# Patient Record
Sex: FEMALE | Race: BLACK OR AFRICAN AMERICAN | NOT HISPANIC OR LATINO | URBAN - METROPOLITAN AREA
[De-identification: names, ages, dates, MRNs, and addresses within clinical notes are randomized per-mention and may not be internally consistent; named-entity substitution may affect disease eponyms.]

---

## 2019-05-17 ENCOUNTER — INPATIENT (INPATIENT)
Facility: HOSPITAL | Age: 79
LOS: 11 days | Discharge: HOME HEALTH | DRG: 299 | End: 2019-05-29
Attending: INTERNAL MEDICINE | Admitting: HOSPITALIST
Payer: MEDICAID

## 2019-05-17 VITALS — WEIGHT: 160.06 LBS | HEIGHT: 62 IN

## 2019-05-17 LAB
ALBUMIN SERPL ELPH-MCNC: 3.9 G/DL — SIGNIFICANT CHANGE UP (ref 3.3–5.2)
ALP SERPL-CCNC: 86 U/L — SIGNIFICANT CHANGE UP (ref 40–120)
ALT FLD-CCNC: 15 U/L — SIGNIFICANT CHANGE UP
ANION GAP SERPL CALC-SCNC: 13 MMOL/L — SIGNIFICANT CHANGE UP (ref 5–17)
AST SERPL-CCNC: 26 U/L — SIGNIFICANT CHANGE UP
BASOPHILS # BLD AUTO: 0 K/UL — SIGNIFICANT CHANGE UP (ref 0–0.2)
BASOPHILS NFR BLD AUTO: 0.4 % — SIGNIFICANT CHANGE UP (ref 0–2)
BILIRUB SERPL-MCNC: 0.3 MG/DL — LOW (ref 0.4–2)
BUN SERPL-MCNC: 16 MG/DL — SIGNIFICANT CHANGE UP (ref 8–20)
CALCIUM SERPL-MCNC: 10.8 MG/DL — HIGH (ref 8.6–10.2)
CHLORIDE SERPL-SCNC: 100 MMOL/L — SIGNIFICANT CHANGE UP (ref 98–107)
CO2 SERPL-SCNC: 27 MMOL/L — SIGNIFICANT CHANGE UP (ref 22–29)
CREAT SERPL-MCNC: 1.11 MG/DL — SIGNIFICANT CHANGE UP (ref 0.5–1.3)
CRP SERPL-MCNC: 1.29 MG/DL — HIGH (ref 0–0.4)
EOSINOPHIL # BLD AUTO: 0.1 K/UL — SIGNIFICANT CHANGE UP (ref 0–0.5)
EOSINOPHIL NFR BLD AUTO: 2 % — SIGNIFICANT CHANGE UP (ref 0–6)
ERYTHROCYTE [SEDIMENTATION RATE] IN BLOOD: 7 MM/HR — SIGNIFICANT CHANGE UP (ref 0–20)
GLUCOSE SERPL-MCNC: 91 MG/DL — SIGNIFICANT CHANGE UP (ref 70–115)
HBA1C BLD-MCNC: 5.4 % — SIGNIFICANT CHANGE UP (ref 4–5.6)
HCT VFR BLD CALC: 34.4 % — LOW (ref 37–47)
HGB BLD-MCNC: 11 G/DL — LOW (ref 12–16)
LYMPHOCYTES # BLD AUTO: 2.1 K/UL — SIGNIFICANT CHANGE UP (ref 1–4.8)
LYMPHOCYTES # BLD AUTO: 43.2 % — SIGNIFICANT CHANGE UP (ref 20–55)
MCHC RBC-ENTMCNC: 28.1 PG — SIGNIFICANT CHANGE UP (ref 27–31)
MCHC RBC-ENTMCNC: 32 G/DL — SIGNIFICANT CHANGE UP (ref 32–36)
MCV RBC AUTO: 88 FL — SIGNIFICANT CHANGE UP (ref 81–99)
MONOCYTES # BLD AUTO: 0.4 K/UL — SIGNIFICANT CHANGE UP (ref 0–0.8)
MONOCYTES NFR BLD AUTO: 7.9 % — SIGNIFICANT CHANGE UP (ref 3–10)
NEUTROPHILS # BLD AUTO: 2.3 K/UL — SIGNIFICANT CHANGE UP (ref 1.8–8)
NEUTROPHILS NFR BLD AUTO: 46.3 % — SIGNIFICANT CHANGE UP (ref 37–73)
PLATELET # BLD AUTO: 308 K/UL — SIGNIFICANT CHANGE UP (ref 150–400)
POTASSIUM SERPL-MCNC: 4.6 MMOL/L — SIGNIFICANT CHANGE UP (ref 3.5–5.3)
POTASSIUM SERPL-SCNC: 4.6 MMOL/L — SIGNIFICANT CHANGE UP (ref 3.5–5.3)
PROT SERPL-MCNC: 9.2 G/DL — HIGH (ref 6.6–8.7)
RBC # BLD: 3.91 M/UL — LOW (ref 4.4–5.2)
RBC # FLD: 14.3 % — SIGNIFICANT CHANGE UP (ref 11–15.6)
SODIUM SERPL-SCNC: 140 MMOL/L — SIGNIFICANT CHANGE UP (ref 135–145)
WBC # BLD: 4.9 K/UL — SIGNIFICANT CHANGE UP (ref 4.8–10.8)
WBC # FLD AUTO: 4.9 K/UL — SIGNIFICANT CHANGE UP (ref 4.8–10.8)

## 2019-05-17 PROCEDURE — 93970 EXTREMITY STUDY: CPT | Mod: 26

## 2019-05-17 PROCEDURE — 99285 EMERGENCY DEPT VISIT HI MDM: CPT

## 2019-05-17 RX ORDER — ONDANSETRON 8 MG/1
4 TABLET, FILM COATED ORAL ONCE
Refills: 0 | Status: COMPLETED | OUTPATIENT
Start: 2019-05-17 | End: 2019-05-17

## 2019-05-17 RX ORDER — VANCOMYCIN HCL 1 G
1000 VIAL (EA) INTRAVENOUS ONCE
Refills: 0 | Status: COMPLETED | OUTPATIENT
Start: 2019-05-17 | End: 2019-05-17

## 2019-05-17 RX ORDER — PIPERACILLIN AND TAZOBACTAM 4; .5 G/20ML; G/20ML
3.38 INJECTION, POWDER, LYOPHILIZED, FOR SOLUTION INTRAVENOUS ONCE
Refills: 0 | Status: COMPLETED | OUTPATIENT
Start: 2019-05-17 | End: 2019-05-17

## 2019-05-17 RX ORDER — MORPHINE SULFATE 50 MG/1
2 CAPSULE, EXTENDED RELEASE ORAL ONCE
Refills: 0 | Status: DISCONTINUED | OUTPATIENT
Start: 2019-05-17 | End: 2019-05-17

## 2019-05-17 RX ORDER — SODIUM CHLORIDE 9 MG/ML
1000 INJECTION INTRAMUSCULAR; INTRAVENOUS; SUBCUTANEOUS ONCE
Refills: 0 | Status: COMPLETED | OUTPATIENT
Start: 2019-05-17 | End: 2019-05-17

## 2019-05-17 RX ADMIN — SODIUM CHLORIDE 1000 MILLILITER(S): 9 INJECTION INTRAMUSCULAR; INTRAVENOUS; SUBCUTANEOUS at 22:14

## 2019-05-17 RX ADMIN — PIPERACILLIN AND TAZOBACTAM 200 GRAM(S): 4; .5 INJECTION, POWDER, LYOPHILIZED, FOR SOLUTION INTRAVENOUS at 21:11

## 2019-05-17 RX ADMIN — MORPHINE SULFATE 2 MILLIGRAM(S): 50 CAPSULE, EXTENDED RELEASE ORAL at 21:20

## 2019-05-17 RX ADMIN — SODIUM CHLORIDE 1000 MILLILITER(S): 9 INJECTION INTRAMUSCULAR; INTRAVENOUS; SUBCUTANEOUS at 23:00

## 2019-05-17 RX ADMIN — ONDANSETRON 4 MILLIGRAM(S): 8 TABLET, FILM COATED ORAL at 22:13

## 2019-05-17 RX ADMIN — MORPHINE SULFATE 2 MILLIGRAM(S): 50 CAPSULE, EXTENDED RELEASE ORAL at 21:11

## 2019-05-17 RX ADMIN — Medication 250 MILLIGRAM(S): at 22:13

## 2019-05-17 RX ADMIN — Medication 1000 MILLIGRAM(S): at 23:00

## 2019-05-17 RX ADMIN — PIPERACILLIN AND TAZOBACTAM 3.38 GRAM(S): 4; .5 INJECTION, POWDER, LYOPHILIZED, FOR SOLUTION INTRAVENOUS at 21:40

## 2019-05-17 NOTE — ED ADULT NURSE NOTE - OBJECTIVE STATEMENT
pt alert and oriented x4 came in c/o right ankle swelling with open wound. pt states she has had this on and off for years but does not have a primary MD to check on it. pt denies history of DM. respirations even unlabored. pt educated on plan of care, pt able to successfully teach back plan of care to RN, RN will continue to reeducate pt during hospital stay.

## 2019-05-17 NOTE — ED PROVIDER NOTE - CARDIAC, MLM
Normal rate, regular rhythm.  Heart sounds S1, S2.  No murmurs, rubs or gallops. 2+ equal, pitting edema in LLE, 2+ DP pulses

## 2019-05-17 NOTE — ED PROVIDER NOTE - SKIN, MLM
Chronic venous stasis changes in [place]E lower extremities, R anterior shin with open wound, ulcer-like with visible granulation tissue 5x4 cm, irregular with surrounding erythema and warmth, no active discharge, no malodor

## 2019-05-17 NOTE — ED ADULT TRIAGE NOTE - CHIEF COMPLAINT QUOTE
c/o pain to right  lower leg , has ulceration c/o pain to right  lower leg , has ulceration, leg swelling

## 2019-05-17 NOTE — ED PROVIDER NOTE - GASTROINTESTINAL, MLM
Abdomen soft, non-distended, RLQ tenderness to palpation, bowel sounds present, no guarding, no rebound

## 2019-05-17 NOTE — ED STATDOCS - PROGRESS NOTE DETAILS
77 y/o F pt with no pert. hx presents to the ED c/o worsening wound infection to RLE, with assoc. pain. Pt states she has an ulceration on her RLE that "opened up". Pt currently resides in [place]. Pt is not currently on medication. Pt to [facility] for further evaluation.

## 2019-05-17 NOTE — ED ADULT NURSE NOTE - NSIMPLEMENTINTERV_GEN_ALL_ED
Implemented All Fall Risk Interventions:  [place] to call system. Call bell, personal items and telephone within reach. Instruct patient to call for assistance. Room bathroom lighting operational. Non-slip footwear when patient is off stretcher. Physically safe environment: no spills, clutter or unnecessary equipment. Stretcher in lowest position, wheels locked, appropriate side rails in place. Provide visual cue, wrist band, yellow gown, etc. Monitor gait and stability. Monitor for mental status changes and reorient to person, place, and time. Review medications for side effects contributing to fall risk. Reinforce activity limits and safety measures with patient and family.

## 2019-05-17 NOTE — ED PROVIDER NOTE - CLINICAL SUMMARY MEDICAL DECISION MAKING FREE TEXT BOX
PT with possible venous stasis ulcer vs. pyoderma pw new onset surrounding cellulitis; will check labs, US, admit for IV antibiotics and further management.

## 2019-05-17 NOTE — ED PROVIDER NOTE - OBJECTIVE STATEMENT
H&P faxed yo [phone]. Patient not cleared due to wound on her leg. I spoke with Bayron Enrique whom states that patient was not cleared at this time.   77 y/o F pt with hx of hysterectomy presents to ED c/o pain and open wound to R lower extremity that began 3 years ago. Pt states the wound heals and reopens intermittently. Pt reports the wound has been open for the last year but worsened 5 days ago. Pain is rated 10/10 in severity. Pt states she is visiting from [place], and has been to a clinic in [place] for her wounds but is unsure of her diagnosis. Pt also notes generalized abdominal pain that she describes as a "knotted" sensation associated with 1 episode of vomiting that began today. Pt last had a BM today. She also notes weight loss over the last 2 months. Denies fever, chills, CP, SOB, and diarrhea. No further complaints at this time.

## 2019-05-18 DIAGNOSIS — L03.90 CELLULITIS, UNSPECIFIED: ICD-10-CM

## 2019-05-18 DIAGNOSIS — L03.115 CELLULITIS OF RIGHT LOWER LIMB: ICD-10-CM

## 2019-05-18 DIAGNOSIS — I87.2 VENOUS INSUFFICIENCY (CHRONIC) (PERIPHERAL): ICD-10-CM

## 2019-05-18 DIAGNOSIS — E83.52 HYPERCALCEMIA: ICD-10-CM

## 2019-05-18 LAB
APPEARANCE UR: CLEAR — SIGNIFICANT CHANGE UP
BACTERIA # UR AUTO: ABNORMAL
BILIRUB UR-MCNC: NEGATIVE — SIGNIFICANT CHANGE UP
COLOR SPEC: YELLOW — SIGNIFICANT CHANGE UP
DIFF PNL FLD: ABNORMAL
EPI CELLS # UR: SIGNIFICANT CHANGE UP
GLUCOSE UR QL: NEGATIVE MG/DL — SIGNIFICANT CHANGE UP
KETONES UR-MCNC: NEGATIVE — SIGNIFICANT CHANGE UP
LEUKOCYTE ESTERASE UR-ACNC: ABNORMAL
NITRITE UR-MCNC: NEGATIVE — SIGNIFICANT CHANGE UP
PH UR: 6.5 — SIGNIFICANT CHANGE UP (ref 5–8)
PROT UR-MCNC: 15 MG/DL
RBC CASTS # UR COMP ASSIST: SIGNIFICANT CHANGE UP /HPF (ref 0–4)
SP GR SPEC: 1.01 — SIGNIFICANT CHANGE UP (ref 1.01–1.02)
UROBILINOGEN FLD QL: NEGATIVE MG/DL — SIGNIFICANT CHANGE UP
WBC UR QL: SIGNIFICANT CHANGE UP

## 2019-05-18 PROCEDURE — 12345: CPT | Mod: NC

## 2019-05-18 PROCEDURE — 74177 CT ABD & PELVIS W/CONTRAST: CPT | Mod: 26

## 2019-05-18 RX ORDER — SACCHAROMYCES BOULARDII 250 MG
250 POWDER IN PACKET (EA) ORAL
Refills: 0 | Status: DISCONTINUED | OUTPATIENT
Start: 2019-05-18 | End: 2019-05-25

## 2019-05-18 RX ORDER — SODIUM CHLORIDE 9 MG/ML
1000 INJECTION INTRAMUSCULAR; INTRAVENOUS; SUBCUTANEOUS
Refills: 0 | Status: DISCONTINUED | OUTPATIENT
Start: 2019-05-18 | End: 2019-05-21

## 2019-05-18 RX ORDER — CEFAZOLIN SODIUM 1 G
1000 VIAL (EA) INJECTION EVERY 8 HOURS
Refills: 0 | Status: DISCONTINUED | OUTPATIENT
Start: 2019-05-18 | End: 2019-05-21

## 2019-05-18 RX ORDER — SODIUM CHLORIDE 9 MG/ML
3 INJECTION INTRAMUSCULAR; INTRAVENOUS; SUBCUTANEOUS EVERY 8 HOURS
Refills: 0 | Status: DISCONTINUED | OUTPATIENT
Start: 2019-05-18 | End: 2019-05-29

## 2019-05-18 RX ORDER — ENOXAPARIN SODIUM 100 MG/ML
40 INJECTION SUBCUTANEOUS EVERY 24 HOURS
Refills: 0 | Status: DISCONTINUED | OUTPATIENT
Start: 2019-05-18 | End: 2019-05-29

## 2019-05-18 RX ORDER — OXYCODONE AND ACETAMINOPHEN 5; 325 MG/1; MG/1
1 TABLET ORAL EVERY 4 HOURS
Refills: 0 | Status: DISCONTINUED | OUTPATIENT
Start: 2019-05-18 | End: 2019-05-25

## 2019-05-18 RX ORDER — ONDANSETRON 8 MG/1
4 TABLET, FILM COATED ORAL EVERY 6 HOURS
Refills: 0 | Status: DISCONTINUED | OUTPATIENT
Start: 2019-05-18 | End: 2019-05-29

## 2019-05-18 RX ADMIN — OXYCODONE AND ACETAMINOPHEN 1 TABLET(S): 5; 325 TABLET ORAL at 10:08

## 2019-05-18 RX ADMIN — Medication 1 TABLET(S): at 16:12

## 2019-05-18 RX ADMIN — SODIUM CHLORIDE 125 MILLILITER(S): 9 INJECTION INTRAMUSCULAR; INTRAVENOUS; SUBCUTANEOUS at 16:12

## 2019-05-18 RX ADMIN — Medication 100 MILLIGRAM(S): at 22:38

## 2019-05-18 RX ADMIN — Medication 100 MILLIGRAM(S): at 05:27

## 2019-05-18 RX ADMIN — SODIUM CHLORIDE 3 MILLILITER(S): 9 INJECTION INTRAMUSCULAR; INTRAVENOUS; SUBCUTANEOUS at 05:27

## 2019-05-18 RX ADMIN — ENOXAPARIN SODIUM 40 MILLIGRAM(S): 100 INJECTION SUBCUTANEOUS at 16:12

## 2019-05-18 RX ADMIN — Medication 100 MILLIGRAM(S): at 16:11

## 2019-05-18 RX ADMIN — Medication 250 MILLIGRAM(S): at 05:27

## 2019-05-18 RX ADMIN — OXYCODONE AND ACETAMINOPHEN 1 TABLET(S): 5; 325 TABLET ORAL at 17:45

## 2019-05-18 RX ADMIN — SODIUM CHLORIDE 125 MILLILITER(S): 9 INJECTION INTRAMUSCULAR; INTRAVENOUS; SUBCUTANEOUS at 22:38

## 2019-05-18 RX ADMIN — SODIUM CHLORIDE 3 MILLILITER(S): 9 INJECTION INTRAMUSCULAR; INTRAVENOUS; SUBCUTANEOUS at 16:13

## 2019-05-18 RX ADMIN — OXYCODONE AND ACETAMINOPHEN 1 TABLET(S): 5; 325 TABLET ORAL at 16:12

## 2019-05-18 RX ADMIN — OXYCODONE AND ACETAMINOPHEN 1 TABLET(S): 5; 325 TABLET ORAL at 11:13

## 2019-05-18 RX ADMIN — SODIUM CHLORIDE 3 MILLILITER(S): 9 INJECTION INTRAMUSCULAR; INTRAVENOUS; SUBCUTANEOUS at 22:37

## 2019-05-18 NOTE — ED ADULT NURSE REASSESSMENT NOTE - NS ED NURSE REASSESS COMMENT FT1
assumed care of patient 1930 charting as noted. pt alert an doriented x4 denies current pain. respirations even unlabored. IV placed in right hand. pt educated on plan of care, pt able to successfully teach back plan of care to RN, RN will continue to reeducate pt during hospital stay.

## 2019-05-18 NOTE — H&P ADULT - PROBLEM SELECTOR PLAN 1
Leg elevation, wound care eval, compression stockings. No DVT on doppler. No fever, WBC, shift, or evidence of systemic infection. No hx of mrsa. Cont. ancef, florastor. Change to PO in 24-48 hrs if improves. Pain regimen, DVT-P, OOB, ambulation

## 2019-05-18 NOTE — PROGRESS NOTE ADULT - SUBJECTIVE AND OBJECTIVE BOX
CC: Right leg ulcer   HPI:  77 y/o female with past medical history chronic RLE ulcer that has been there for past 3 years. She denies any other known medical problems. She is visiting from [place]. She states in the past she has been given abx for her leg when it flairs up. She denies fever, chills, SOB, CP, focal weakness. Her family took her to the ED after they saw her leg and also because she was c/o N/V. No sick contacts. She takes no medications. Denies diarrhea. No one else is sick at home. In the ED patient with no fever, benign abdominal exam, no WBC or shift. CT abd/pelvis with no acute pathology. RLE with varicosities, chronic appearing superficial ulcer over anterior ankle with surrounding erythema, warmth, and pain c/w cellulitis. Doppler neg for DVT. (18 May 2019 04:29)    REVIEW OF SYSTEMS:    Patient denied fever, chills, abdominal pain, nausea, vomiting, cough, shortness of breath, chest pain or palpitations    Vital Signs Last 24 Hrs  T(C): 36.4 (18 May 2019 16:08), Max: 36.9 (18 May 2019 04:29)  T(F): 97.6 (18 May 2019 16:08), Max: 98.5 (18 May 2019 04:29)  HR: 67 (18 May 2019 16:08) (57 - 77)  BP: 120/69 (18 May 2019 16:08) (114/61 - 170/90)  BP(mean): 116 (18 May 2019 04:29) (116 - 116)  RR: 18 (18 May 2019 16:08) (16 - 18)  SpO2: 98% (18 May 2019 16:08) (96% - 99%)I&O's Summary    PHYSICAL EXAM:  GENERAL: NAD, well-groomed  HEENT: PERRL, +EOMI, anicteric, no [place]  NECK: Supple, No JVD   CHEST/LUNG: CTA bilaterally; Normal effort  HEART: S1S2 Normal intensity, no murmurs, gallops or rubs noted  ABDOMEN: Soft, BS Normoactive, NT, ND, no HSM noted  EXTREMITIES:  2+ radial and DP pulses noted, no clubbing, cyanosis, or edema noted, FROM x 4  SKIN: No rashes or lesions noted  NEURO: A&Ox3, no focal deficits noted, CN II-XII intact  PSYCH: normal mood and affect; insight/judgement appropriate  LABS:                        11.0   4.9   )-----------( 308      ( 17 May 2019 20:56 )             34.4         140  |  100  |  16.0  ----------------------------<  91  4.6   |  27.0  |  1.11    Ca    10.8<H>      17 May 2019 20:56    TPro  9.2<H>  /  Alb  3.9  /  TBili  0.3<L>  /  DBili  x   /  AST  26  /  ALT  15  /  AlkPhos  86        Urinalysis Basic - ( 18 May 2019 16:50 )    Color: Yellow / Appearance: Clear / S.010 / pH: x  Gluc: x / Ketone: Negative  / Bili: Negative / Urobili: Negative mg/dL   Blood: x / Protein: 15 mg/dL / Nitrite: Negative   Leuk Esterase: Small / RBC: 0-2 /HPF / WBC 3-5   Sq Epi: x / Non Sq Epi: Occasional / Bacteria: Occasional      RADIOLOGY & ADDITIONAL TESTS:    MEDICATIONS:  MEDICATIONS  (STANDING):  ceFAZolin   IVPB 1000 milliGRAM(s) IV Intermittent every 8 hours  enoxaparin Injectable 40 milliGRAM(s) SubCutaneous every 24 hours  multivitamin 1 Tablet(s) Oral daily  saccharomyces boulardii 250 milliGRAM(s) Oral two times a day  sodium chloride 0.9% lock flush 3 milliLiter(s) IV Push every 8 hours  sodium chloride 0.9%. 1000 milliLiter(s) (125 mL/Hr) IV Continuous <Continuous>    MEDICATIONS  (PRN):  ondansetron Injectable 4 milliGRAM(s) IV Push every 6 hours PRN Nausea  oxyCODONE    5 mG/acetaminophen 325 mG 1 Tablet(s) Oral every 4 hours PRN Moderate Pain (4 - 6) CC: Right leg ulcer   HPI:  79 y/o female with past medical history chronic RLE ulcer that has been there for past 3 years. She denies any other known medical problems. She is visiting from [place]. She states in the past she has been given abx for her leg when it flairs up. She denies fever, chills, SOB, CP, focal weakness. Her family took her to the ED after they saw her leg and also because she was c/o N/V. No sick contacts. She takes no medications. Denies diarrhea. No one else is sick at home. In the ED patient with no fever, benign abdominal exam, no WBC or shift. CT abd/pelvis with no acute pathology. RLE with varicosities, chronic appearing superficial ulcer over anterior ankle with surrounding erythema, warmth, and pain c/w cellulitis. Doppler neg for DVT. (18 May 2019 04:29)    REVIEW OF SYSTEMS:    Patient denied fever, chills, abdominal pain, nausea, vomiting, cough, shortness of breath, chest pain or palpitations    Vital Signs Last 24 Hrs  T(C): 36.4 (18 May 2019 16:08), Max: 36.9 (18 May 2019 04:29)  T(F): 97.6 (18 May 2019 16:08), Max: 98.5 (18 May 2019 04:29)  HR: 67 (18 May 2019 16:08) (57 - 77)  BP: 120/69 (18 May 2019 16:08) (114/61 - 170/90)  BP(mean): 116 (18 May 2019 04:29) (116 - 116)  RR: 18 (18 May 2019 16:08) (16 - 18)  SpO2: 98% (18 May 2019 16:08) (96% - 99%)I&O's Summary    PHYSICAL EXAM:  GENERAL: NAD,   HEENT: PERRL, +EOMI, anicteric, no [place]  NECK: Supple, No JVD   CHEST/LUNG: CTA bilaterally; Normal effort  HEART: S1S2 Normal intensity, no murmurs, gallops or rubs noted  ABDOMEN: Soft, BS Normoactive, NT, ND, no HSM noted  EXTREMITIES:  2+ radial and DP pulses noted, no clubbing, cyanosis, or edema noted, Limited mobility   SKIN: 6cm diameter superficial chronic ulceration of anterior lower third of right shin with surrounding cellulitis.  NEURO: A&Ox3, no focal deficits noted, CN II-XII intact  PSYCH: Depressed mood and affect; insight/judgement appropriate  LABS:                        11.0   4.9   )-----------( 308      ( 17 May 2019 20:56 )             34.4         140  |  100  |  16.0  ----------------------------<  91  4.6   |  27.0  |  1.11    Ca    10.8<H>      17 May 2019 20:56    TPro  9.2<H>  /  Alb  3.9  /  TBili  0.3<L>  /  DBili  x   /  AST  26  /  ALT  15  /  AlkPhos  86        Urinalysis Basic - ( 18 May 2019 16:50 )    Color: Yellow / Appearance: Clear / S.010 / pH: x  Gluc: x / Ketone: Negative  / Bili: Negative / Urobili: Negative mg/dL   Blood: x / Protein: 15 mg/dL / Nitrite: Negative   Leuk Esterase: Small / RBC: 0-2 /HPF / WBC 3-5   Sq Epi: x / Non Sq Epi: Occasional / Bacteria: Occasional      RADIOLOGY & ADDITIONAL TESTS:    MEDICATIONS:  MEDICATIONS  (STANDING):  ceFAZolin   IVPB 1000 milliGRAM(s) IV Intermittent every 8 hours  enoxaparin Injectable 40 milliGRAM(s) SubCutaneous every 24 hours  multivitamin 1 Tablet(s) Oral daily  saccharomyces boulardii 250 milliGRAM(s) Oral two times a day  sodium chloride 0.9% lock flush 3 milliLiter(s) IV Push every 8 hours  sodium chloride 0.9%. 1000 milliLiter(s) (125 mL/Hr) IV Continuous <Continuous>    MEDICATIONS  (PRN):  ondansetron Injectable 4 milliGRAM(s) IV Push every 6 hours PRN Nausea  oxyCODONE    5 mG/acetaminophen 325 mG 1 Tablet(s) Oral every 4 hours PRN Moderate Pain (4 - 6)

## 2019-05-18 NOTE — H&P ADULT - HISTORY OF PRESENT ILLNESS
79 y/o female with past medical history chronic RLE ulcer that has been there for past 3 years. She denies any other known medical problems. She is visiting from [place]. She states in the past she has been given abx for her leg when it flairs up. She denies fever, chills, SOB, CP, focal weakness. Her family took her to the ED after they saw her leg and also because she was c/o N/V. No sick contacts. She takes no medications. Denies diarrhea. No one else is sick at home. In the ED patient with no fever, benign abdominal exam, no WBC or shift. CT abd/pelvis with no acute pathology. RLE with varicosities, chronic appearing superficial ulcer over anterior ankle with surrounding erythema, warmth, and pain c/w cellulitis. Doppler neg for DVT.

## 2019-05-18 NOTE — H&P ADULT - PROBLEM SELECTOR PLAN 3
Patient with mild anemia, hypercalcemia elevated protein/albumin ratio. Likely with underlying MM. Check SPEP, UPEP, cont. IV hydration, repeat Ca. O/P heme eval.

## 2019-05-18 NOTE — ED ADULT NURSE REASSESSMENT NOTE - NS ED NURSE REASSESS COMMENT FT1
Pt remains at baseline resting comfortably with VSS, no acute s/s of respiratory distress noted or reported at this time, will continue to monitor

## 2019-05-18 NOTE — ED ADULT NURSE REASSESSMENT NOTE - NS ED NURSE REASSESS COMMENT FT1
pt alert and oriented x4 resting in bed. pt denies pain, wound AUSTYN at this time. respirations even unlabored. pt educated on plan of care, pt able to successfully teach back plan of care to RN, RN will continue to reeducate pt during hospital stay.

## 2019-05-18 NOTE — H&P ADULT - ASSESSMENT
79 y/o female with RLE venous stasis ulcer from varicose veins with superimposed cellulitis, Hypercalcemia, Hyperproteinemia, mild anemia

## 2019-05-18 NOTE — H&P ADULT - EXTREMITIES COMMENTS
RLE with 5x5 anterior ankle ulcer with surrounding erythema, warmth, pain. distal extrem cap refil <3 sec, no breakdown.

## 2019-05-18 NOTE — PROGRESS NOTE ADULT - ASSESSMENT
77 y/o female with RLE venous stasis ulcer from varicose veins with superimposed cellulitis, Hypercalcemia, Hyperproteinemia, mild anemia     Problem/Plan - 1:  ·  Problem: Venous stasis dermatitis of lower extremity.  Plan: Leg elevation, wound care eval, compression stockings. No DVT on doppler. No fever, WBC, shift, or evidence of systemic infection. No hx of mrsa. Cont. ancef, florastor. Change to PO in 24-48 hrs if improves. Pain regimen, DVT-P, OOB, ambulation.      Problem/Plan - 2:  ·  Problem: Cellulitis of right lower extremity.  Plan: Plan as above.      Problem/Plan - 3:  ·  Problem: Hypercalcemia.  Plan: Patient with mild anemia, hypercalcemia elevated protein/albumin ratio. Likely with underlying MM. Check SPEP, UPEP, cont. IV hydration, repeat Ca. O/P heme eval. 77 y/o female with RLE venous stasis ulcer from varicose veins with superimposed cellulitis, Hypercalcemia, Hyperproteinemia, mild anemia     Problem/Plan - 1:  ·  Problem: Right lower ext skin ulcer.  Plan:  wound care eval, compression stockings. No DVT on doppler. No evidence of systemic infection. No hx of mrsa. Cont. ancef,   ID consult       Problem/Plan - 2:  ·  Problem: Cellulitis of right lower extremity.  Plan: Plan as above.      Problem/Plan - 3:  ·  Problem: Hypercalcemia.  Plan: Patient with mild anemia, hypercalcemia elevated protein/albumin ratio. Likely with underlying MM. Check SPEP, UPEP, cont. IV hydration, repeat Ca. O/P heme eval.

## 2019-05-19 LAB
ANION GAP SERPL CALC-SCNC: 12 MMOL/L — SIGNIFICANT CHANGE UP (ref 5–17)
BASOPHILS # BLD AUTO: 0 K/UL — SIGNIFICANT CHANGE UP (ref 0–0.2)
BASOPHILS NFR BLD AUTO: 1 % — SIGNIFICANT CHANGE UP (ref 0–2)
BUN SERPL-MCNC: 16 MG/DL — SIGNIFICANT CHANGE UP (ref 8–20)
CALCIUM SERPL-MCNC: 9.8 MG/DL — SIGNIFICANT CHANGE UP (ref 8.6–10.2)
CHLORIDE SERPL-SCNC: 98 MMOL/L — SIGNIFICANT CHANGE UP (ref 98–107)
CO2 SERPL-SCNC: 26 MMOL/L — SIGNIFICANT CHANGE UP (ref 22–29)
CREAT SERPL-MCNC: 1.52 MG/DL — HIGH (ref 0.5–1.3)
EOSINOPHIL # BLD AUTO: 0.1 K/UL — SIGNIFICANT CHANGE UP (ref 0–0.5)
EOSINOPHIL NFR BLD AUTO: 2.6 % — SIGNIFICANT CHANGE UP (ref 0–6)
GLUCOSE SERPL-MCNC: 76 MG/DL — SIGNIFICANT CHANGE UP (ref 70–115)
HCT VFR BLD CALC: 32.4 % — LOW (ref 37–47)
HGB BLD-MCNC: 10 G/DL — LOW (ref 12–16)
LYMPHOCYTES # BLD AUTO: 2 K/UL — SIGNIFICANT CHANGE UP (ref 1–4.8)
LYMPHOCYTES # BLD AUTO: 46.5 % — SIGNIFICANT CHANGE UP (ref 20–55)
MCHC RBC-ENTMCNC: 27.5 PG — SIGNIFICANT CHANGE UP (ref 27–31)
MCHC RBC-ENTMCNC: 30.9 G/DL — LOW (ref 32–36)
MCV RBC AUTO: 89 FL — SIGNIFICANT CHANGE UP (ref 81–99)
MONOCYTES # BLD AUTO: 0.4 K/UL — SIGNIFICANT CHANGE UP (ref 0–0.8)
MONOCYTES NFR BLD AUTO: 9.5 % — SIGNIFICANT CHANGE UP (ref 3–10)
NEUTROPHILS # BLD AUTO: 1.7 K/UL — LOW (ref 1.8–8)
NEUTROPHILS NFR BLD AUTO: 40.2 % — SIGNIFICANT CHANGE UP (ref 37–73)
PLATELET # BLD AUTO: 269 K/UL — SIGNIFICANT CHANGE UP (ref 150–400)
POTASSIUM SERPL-MCNC: 4.4 MMOL/L — SIGNIFICANT CHANGE UP (ref 3.5–5.3)
POTASSIUM SERPL-SCNC: 4.4 MMOL/L — SIGNIFICANT CHANGE UP (ref 3.5–5.3)
PROT SERPL-MCNC: 7.5 G/DL — SIGNIFICANT CHANGE UP (ref 6–8.3)
PROT SERPL-MCNC: 7.5 G/DL — SIGNIFICANT CHANGE UP (ref 6–8.3)
RBC # BLD: 3.64 M/UL — LOW (ref 4.4–5.2)
RBC # FLD: 14.5 % — SIGNIFICANT CHANGE UP (ref 11–15.6)
SODIUM SERPL-SCNC: 136 MMOL/L — SIGNIFICANT CHANGE UP (ref 135–145)
WBC # BLD: 4.2 K/UL — LOW (ref 4.8–10.8)
WBC # FLD AUTO: 4.2 K/UL — LOW (ref 4.8–10.8)

## 2019-05-19 PROCEDURE — 99232 SBSQ HOSP IP/OBS MODERATE 35: CPT

## 2019-05-19 PROCEDURE — 99222 1ST HOSP IP/OBS MODERATE 55: CPT

## 2019-05-19 PROCEDURE — 93925 LOWER EXTREMITY STUDY: CPT | Mod: 26

## 2019-05-19 RX ADMIN — Medication 100 MILLIGRAM(S): at 23:04

## 2019-05-19 RX ADMIN — SODIUM CHLORIDE 3 MILLILITER(S): 9 INJECTION INTRAMUSCULAR; INTRAVENOUS; SUBCUTANEOUS at 05:31

## 2019-05-19 RX ADMIN — Medication 100 MILLIGRAM(S): at 14:21

## 2019-05-19 RX ADMIN — SODIUM CHLORIDE 3 MILLILITER(S): 9 INJECTION INTRAMUSCULAR; INTRAVENOUS; SUBCUTANEOUS at 23:02

## 2019-05-19 RX ADMIN — Medication 1 TABLET(S): at 12:34

## 2019-05-19 RX ADMIN — SODIUM CHLORIDE 3 MILLILITER(S): 9 INJECTION INTRAMUSCULAR; INTRAVENOUS; SUBCUTANEOUS at 12:31

## 2019-05-19 RX ADMIN — Medication 250 MILLIGRAM(S): at 05:31

## 2019-05-19 RX ADMIN — OXYCODONE AND ACETAMINOPHEN 1 TABLET(S): 5; 325 TABLET ORAL at 03:03

## 2019-05-19 RX ADMIN — Medication 250 MILLIGRAM(S): at 17:42

## 2019-05-19 RX ADMIN — OXYCODONE AND ACETAMINOPHEN 1 TABLET(S): 5; 325 TABLET ORAL at 04:03

## 2019-05-19 RX ADMIN — Medication 100 MILLIGRAM(S): at 05:31

## 2019-05-19 RX ADMIN — OXYCODONE AND ACETAMINOPHEN 1 TABLET(S): 5; 325 TABLET ORAL at 10:13

## 2019-05-19 RX ADMIN — ENOXAPARIN SODIUM 40 MILLIGRAM(S): 100 INJECTION SUBCUTANEOUS at 17:42

## 2019-05-19 RX ADMIN — OXYCODONE AND ACETAMINOPHEN 1 TABLET(S): 5; 325 TABLET ORAL at 09:14

## 2019-05-19 NOTE — CONSULT NOTE ADULT - SUBJECTIVE AND OBJECTIVE BOX
[facility]  INFECTIOUS DISEASES AND INTERNAL MEDICINE at [place]  =======================================================  Sourav Kirk MD  Diplomates American Board of Internal Medicine and Infectious Diseases  Telephone [phone]  Fax            [phone]  =======================================================    [facility]-[id number]  RONALD ALDRICH   This 79 y/o female with past medical history chronic RLE ulcer that has been there for past 3 years. She denies any other known medical problems. She is visiting from [place]. She states in the past she has been given abx for her leg when it flairs up. She denies fever, chills, SOB, CP, focal weakness. Her family took her to the ED after they saw her leg and also because she was c/o N/V. No sick contacts. She takes no medications. Denies diarrhea. No one else is sick at home. In the ED patient with no fever, benign abdominal exam, no WBC or shift. CT abd/pelvis with no acute pathology. RLE with varicosities, chronic appearing superficial ulcer over anterior ankle with surrounding erythema, warmth, and pain c/w cellulitis. Doppler neg for DVT.     patient denies hx of inflammatory bowel disease.  No fevers noted.   She was started on empiric Cefazolin.     =======================================================  Past Medical & Surgical Hx:  =====================  PAST MEDICAL & SURGICAL HISTORY:  Recurrent varicose veins  Venous stasis dermatitis of lower extremity  No significant past surgical history    Problem List:  ==========  HEALTH ISSUES - PROBLEM Dx:  Hypercalcemia: Hypercalcemia  Cellulitis of right lower extremity: Cellulitis of right lower extremity  Venous stasis dermatitis of lower extremity: Venous stasis dermatitis of lower extremity    Social Hx:  =======  no toxic habits currently    FAMILY HISTORY:  No pertinent family history in first degree relatives  no significant family history of immunosuppressive disorders in mother or father   =======================================================  REVIEW OF SYSTEMS:  as above  all other ROS negative  =======================================================  Allergies  No Known Allergies    Antibiotics:  ceFAZolin   IVPB 1000 milliGRAM(s) IV Intermittent every 8 hours    Other medications:  enoxaparin Injectable 40 milliGRAM(s) SubCutaneous every 24 hours  multivitamin 1 Tablet(s) Oral daily  saccharomyces boulardii 250 milliGRAM(s) Oral two times a day  sodium chloride 0.9% lock flush 3 milliLiter(s) IV Push every 8 hours  sodium chloride 0.9%. 1000 milliLiter(s) IV Continuous <Continuous>     ceFAZolin   IVPB   100 mL/Hr IV Intermittent (05-18-19 @ 05:27)   100 mL/Hr IV Intermittent (05-18-19 @ 16:11)   100 mL/Hr IV Intermittent (05-18-19 @ 22:38)   100 mL/Hr IV Intermittent (05-19-19 @ 05:31)   100 mL/Hr IV Intermittent (05-19-19 @ 14:21)    piperacillin/tazobactam IVPB.   200 mL/Hr IV Intermittent (05-17-19 @ 21:11)    vancomycin  IVPB   250 mL/Hr IV Intermittent (05-17-19 @ 22:13)      ======================================================  Physical Exam:  ============  T(F): 98 (19 May 2019 00:19), Max: 98.8 (18 May 2019 19:30)  HR: 65 (19 May 2019 00:19)  BP: 112/61 (19 May 2019 00:19)  RR: 18 (19 May 2019 00:19)  SpO2: 98% (18 May 2019 21:29) (98% - 99%)  temp max in last 48H T(F): , Max: 98.8 (05-18-19 @ 19:30)    General:  No acute distress.  THIN   Eye: Pupils are equal, round and reactive to light, Extraocular movements are intact, Normal conjunctiva.  HENT: Normocephalic, Oral mucosa is moist, No pharyngeal erythema, No sinus tenderness.  Neck: Supple, No lymphadenopathy.  Respiratory: Lungs are clear to auscultation, Respirations are non-labored.  Cardiovascular: Normal rate, Regular rhythm,   VARICOSE VEINS IN LOWER EXTREMITIES  Gastrointestinal: Soft, Non-tender, Non-distended, Normal bowel sounds.  Genitourinary: No costovertebral angle tenderness.  Lymphatics: No lymphadenopathy neck,   Musculoskeletal: Normal range of motion, Normal strength.  Integumentary:  RIGHT LEG ULCER WITH MOD AMOUNT OF SLOUGH, ULCER MEASURES 4 X 6 CM APPROX. Tender to touch  Neurologic: Alert, Oriented, No focal deficits, Cranial Nerves II-XII are grossly intact.  Psychiatric: Appropriate mood & affect.    =======================================================  Labs:                        10.0   4.2   )-----------( 269      ( 19 May 2019 09:16 )             32.4       WBC Count: 4.2 K/uL (05-19-19 @ 09:16)  WBC Count: 4.9 K/uL (05-17-19 @ 20:56)      05-19    136  |  98  |  16.0  ----------------------------<  76  4.4   |  26.0  |  1.52<H>    Ca    9.8      19 May 2019 09:16    TPro  7.5  /  Alb  x   /  TBili  x   /  DBili  x   /  AST  x   /  ALT  x   /  AlkPhos  x   05-18

## 2019-05-19 NOTE — CONSULT NOTE ADULT - ASSESSMENT
A/P: 77 yo F with severe venous stasis disease and lower extremity varicosities, R ankle chronic nonhealing venous stasis ulcer now has superimposed cellulitis up calf    -Follow up final read arteral duplex  -Continue antibiotics per primary team  -Leg elevation  -Wet to dry dressings saline gauze were placed today  -Please order Santyl and begin applying daily to the ulcer base; there is fibrinous exudate, with wet to dry gauze dressings on top  -Eventually when this cellulitis resolves, patient will need UNNA boot to right ankle over ulcer.   -Once the ulcer completely heals, will need long term compression stocking therapy and followup with a vascular surgeon  -Patient seen by me and discussed with Dr. Anderson  Thank you for this consult; will follow

## 2019-05-19 NOTE — PROGRESS NOTE ADULT - ASSESSMENT
77 y/o female with RLE venous stasis ulcer from varicose veins with superimposed cellulitis, Hypercalcemia, Hyperproteinemia, mild anemia     Problem/Plan - 1:  ·  Problem: Right lower ext skin ulcer.  Plan:  wound care eval,   No DVT on doppler. No evidence of systemic infection. No hx of mrsa.   Cont. ancef,   ID consult   Vascular surgery consult   To obtain arterial doppler. CATE     Problem/Plan - 2:  ·  Problem: Cellulitis of right lower extremity.  Plan: Abx as above .      Problem/Plan - 3:  ·  Problem: Hypercalcemia.  Plan: Patient with mild anemia, hypercalcemia elevated protein/albumin ratio. Likely with underlying MM. Check SPEP, UPEP, cont. IV hydration, repeat Ca. O/P heme eval. Serum calcium is normalized. Awaiting serum protein electrophoresis to rule out myeloma.

## 2019-05-19 NOTE — PROGRESS NOTE ADULT - SUBJECTIVE AND OBJECTIVE BOX
CC: Right leg ulcer.  Peripheral vascular disease.    HPI:  77 y/o female with past medical history chronic RLE ulcer that has been there for past 3 years. She denies any other known medical problems. She is visiting from [place]. She states in the past she has been given abx for her leg when it flairs up. She denies fever, chills, SOB, CP, focal weakness. Her family took her to the ED after they saw her leg and also because she was c/o N/V. No sick contacts. She takes no medications. Denies diarrhea. No one else is sick at home. In the ED patient with no fever, benign abdominal exam, no WBC or shift. CT abd/pelvis with no acute pathology. RLE with varicosities, chronic appearing superficial ulcer over anterior ankle with surrounding erythema, warmth, and pain c/w cellulitis. Doppler neg for DVT. (18 May 2019 04:29)    REVIEW OF SYSTEMS:    Patient denied fever, chills, abdominal pain, nausea, vomiting, cough, shortness of breath, chest pain or palpitations    Vital Signs Last 24 Hrs  T(C): 36.7 (19 May 2019 00:19), Max: 37.1 (18 May 2019 19:30)  T(F): 98 (19 May 2019 00:19), Max: 98.8 (18 May 2019 19:30)  HR: 65 (19 May 2019 00:19) (65 - 88)  BP: 112/61 (19 May 2019 00:19) (112/61 - 144/68)  BP(mean): --  RR: 18 (19 May 2019 00:19) (18 - 20)  SpO2: 98% (18 May 2019 21:29) (98% - 99%)I&O's Summary    PHYSICAL EXAM:  GENERAL: NAD,   HEENT: PERRL, +EOMI, anicteric, no [place]  NECK: Supple, No JVD   CHEST/LUNG: CTA bilaterally; Normal effort  HEART: S1S2 Normal intensity, no murmurs, gallops or rubs noted  ABDOMEN: Soft, BS Normoactive, NT, ND, no HSM noted  EXTREMITIES:  Non demonstrable radial and DP pulses noted on right . No edema noted, Limited mobility   SKIN: skin loss 6 cm diameter ulcer lower 1/3 right lower ext.   NEURO: A&Ox3, no focal deficits noted, CN II-XII intact  PSYCH: Depressed  mood and affect; insight/judgement appropriate  LABS:                        10.0   4.2   )-----------( 269      ( 19 May 2019 09:16 )             32.4     05-    136  |  98  |  16.0  ----------------------------<  76  4.4   |  26.0  |  1.52<H>    Ca    9.8      19 May 2019 09:16    TPro  9.2<H>  /  Alb  3.9  /  TBili  0.3<L>  /  DBili  x   /  AST  26  /  ALT  15  /  AlkPhos  86  05-17      Urinalysis Basic - ( 18 May 2019 16:50 )    Color: Yellow / Appearance: Clear / S.010 / pH: x  Gluc: x / Ketone: Negative  / Bili: Negative / Urobili: Negative mg/dL   Blood: x / Protein: 15 mg/dL / Nitrite: Negative   Leuk Esterase: Small / RBC: 0-2 /HPF / WBC 3-5   Sq Epi: x / Non Sq Epi: Occasional / Bacteria: Occasional      RADIOLOGY & ADDITIONAL TESTS:    MEDICATIONS:  MEDICATIONS  (STANDING):  ceFAZolin   IVPB 1000 milliGRAM(s) IV Intermittent every 8 hours  enoxaparin Injectable 40 milliGRAM(s) SubCutaneous every 24 hours  multivitamin 1 Tablet(s) Oral daily  saccharomyces boulardii 250 milliGRAM(s) Oral two times a day  sodium chloride 0.9% lock flush 3 milliLiter(s) IV Push every 8 hours  sodium chloride 0.9%. 1000 milliLiter(s) (125 mL/Hr) IV Continuous <Continuous>    MEDICATIONS  (PRN):  ondansetron Injectable 4 milliGRAM(s) IV Push every 6 hours PRN Nausea  oxyCODONE    5 mG/acetaminophen 325 mG 1 Tablet(s) Oral every 4 hours PRN Moderate Pain (4 - 6)

## 2019-05-19 NOTE — CONSULT NOTE ADULT - ASSESSMENT
This 79 y/o female with chronic RLE ulcer that has been there for past 3 years.       RLE ulcer being treated as cellulitis    appears vascular in nature    suggest vascular consult    Continue wound care     Check ESR and CRP.     - continue Cefazolin for now.       - follow up all outstanding cultures  - trend temperature and WBC curve  - repeat cultures from blood and all sources if febrile.

## 2019-05-19 NOTE — CONSULT NOTE ADULT - SUBJECTIVE AND OBJECTIVE BOX
77 y/o female with past medical history chronic RLE ulcer that has been there for past 3 years. She denies any other known medical problems. She is visiting from [place]. She states in the past she has been given abx for her leg when it flairs up. She denies fever, chills, SOB, CP, focal weakness. Her family took her to the ED after they saw her leg and also because she was c/o N/V. No sick contacts. She takes no medications. Denies diarrhea. No one else is sick at home. In the ED patient with no fever, benign abdominal exam, no WBC or shift. CT abd/pelvis with no acute pathology. RLE with varicosities, chronic appearing superficial ulcer over anterior ankle with surrounding erythema, warmth, and pain c/w cellulitis. Doppler neg for DVT.     ROS:  HEENT: Denies headache, blurry vision  RESPIRATORY: Denies cough  CARDIAC: Denies chest pain, racing heart  GASTROINTESTINAL: Denies, constipation, diarrhea  GENITOURINARY: Denies dysuria, hematuria  NEUROLOGIC: Denies headaches, dizziness  MUSCULOSKELETAL: See above  VASCULAR: Denies claudication and cramping.  ENDOCRINOLOGY: Denies heat or cold intolerance  HEMATOLOGY: Denies easy bleeding or blood transfusion.  DERMATOLOGY: Denies changes in moles or pigmentation changes  PSYCHIATRY: Denies depression, agitation or anxiety    INTERVAL HPI/OVERNIGHT EVENTS:    SUBJECTIVE:      MEDICATIONS  (STANDING):  ceFAZolin   IVPB 1000 milliGRAM(s) IV Intermittent every 8 hours  enoxaparin Injectable 40 milliGRAM(s) SubCutaneous every 24 hours  multivitamin 1 Tablet(s) Oral daily  saccharomyces boulardii 250 milliGRAM(s) Oral two times a day  sodium chloride 0.9% lock flush 3 milliLiter(s) IV Push every 8 hours  sodium chloride 0.9%. 1000 milliLiter(s) (125 mL/Hr) IV Continuous <Continuous>    MEDICATIONS  (PRN):  ondansetron Injectable 4 milliGRAM(s) IV Push every 6 hours PRN Nausea  oxyCODONE    5 mG/acetaminophen 325 mG 1 Tablet(s) Oral every 4 hours PRN Moderate Pain (4 - 6)      Vital Signs Last 24 Hrs  T(C): 36.7 (19 May 2019 16:14), Max: 36.7 (19 May 2019 16:14)  T(F): 98.1 (19 May 2019 16:14), Max: 98.1 (19 May 2019 16:14)  HR: 74 (19 May 2019 16:14) (74 - 74)  BP: 124/62 (19 May 2019 16:14) (124/62 - 124/62)  BP(mean): --  RR: 18 (19 May 2019 16:14) (18 - 18)  SpO2: 94% (19 May 2019 16:14) (94% - 94%)    PE  Gen: Pleasant, A&Ox3, nondemented  Pulm: CTAB  CV: RRR  Abd: NTND  Ext: WWP; R anterior ankle has 5x5cm circular ulcer with fibrinous exudate. Cellulitis extends up to upper calf. Left calf has two small areas of cellulitis as well approx 10x10 cm each, on lateral and medial aspects  Vasc:  R PT and AT monophasic dopplerable; L DP and AT monophasic dopplerable. Cap refill 1-2 sec both feet; Both feet WWP.  Large varicose veins noted to level of lower thighs bilaterally Plus spider veins bilaterally.   Neuro: CN ii-xii intact, nonfocal      I&O's Detail    19 May 2019 07:01  -  20 May 2019 00:25  --------------------------------------------------------  IN:    Oral Fluid: 600 mL    sodium chloride 0.9%.: 1375 mL  Total IN: 1975 mL    OUT:  Total OUT: 0 mL    Total NET: 1975 mL          LABS:                        10.0   4.2   )-----------( 269      ( 19 May 2019 09:16 )             32.4     05-    136  |  98  |  16.0  ----------------------------<  76  4.4   |  26.0  |  1.52<H>    Ca    9.8      19 May 2019 09:16    TPro  7.5  /  Alb  x   /  TBili  x   /  DBili  x   /  AST  x   /  ALT  x   /  AlkPhos  x   05-18      Urinalysis Basic - ( 18 May 2019 16:50 )    Color: Yellow / Appearance: Clear / S.010 / pH: x  Gluc: x / Ketone: Negative  / Bili: Negative / Urobili: Negative mg/dL   Blood: x / Protein: 15 mg/dL / Nitrite: Negative   Leuk Esterase: Small / RBC: 0-2 /HPF / WBC 3-5   Sq Epi: x / Non Sq Epi: Occasional / Bacteria: Occasional

## 2019-05-20 LAB
% ALBUMIN: 43 % — SIGNIFICANT CHANGE UP
% ALPHA 1: 5 % — SIGNIFICANT CHANGE UP
% ALPHA 2: 11.1 % — SIGNIFICANT CHANGE UP
% BETA: 15.2 % — SIGNIFICANT CHANGE UP
% GAMMA: 25.7 % — SIGNIFICANT CHANGE UP
ALBUMIN SERPL ELPH-MCNC: 2.8 G/DL — LOW (ref 3.3–5.2)
ALBUMIN SERPL ELPH-MCNC: 3.2 G/DL — LOW (ref 3.6–5.5)
ALBUMIN/GLOB SERPL ELPH: 0.7 RATIO — SIGNIFICANT CHANGE UP
ALP SERPL-CCNC: 63 U/L — SIGNIFICANT CHANGE UP (ref 40–120)
ALPHA1 GLOB SERPL ELPH-MCNC: 0.4 G/DL — SIGNIFICANT CHANGE UP (ref 0.1–0.4)
ALPHA2 GLOB SERPL ELPH-MCNC: 0.8 G/DL — SIGNIFICANT CHANGE UP (ref 0.5–1)
ALT FLD-CCNC: 9 U/L — SIGNIFICANT CHANGE UP
ANION GAP SERPL CALC-SCNC: 9 MMOL/L — SIGNIFICANT CHANGE UP (ref 5–17)
AST SERPL-CCNC: 21 U/L — SIGNIFICANT CHANGE UP
B-GLOBULIN SERPL ELPH-MCNC: 1.1 G/DL — HIGH (ref 0.5–1)
BILIRUB SERPL-MCNC: <0.2 MG/DL — LOW (ref 0.4–2)
BUN SERPL-MCNC: 22 MG/DL — HIGH (ref 8–20)
CALCIUM SERPL-MCNC: 9 MG/DL — SIGNIFICANT CHANGE UP (ref 8.6–10.2)
CHLORIDE SERPL-SCNC: 105 MMOL/L — SIGNIFICANT CHANGE UP (ref 98–107)
CO2 SERPL-SCNC: 23 MMOL/L — SIGNIFICANT CHANGE UP (ref 22–29)
CREAT SERPL-MCNC: 1.15 MG/DL — SIGNIFICANT CHANGE UP (ref 0.5–1.3)
GAMMA GLOBULIN: 1.9 G/DL — HIGH (ref 0.6–1.6)
GLUCOSE SERPL-MCNC: 75 MG/DL — SIGNIFICANT CHANGE UP (ref 70–115)
HCT VFR BLD CALC: 28.6 % — LOW (ref 37–47)
HGB BLD-MCNC: 8.9 G/DL — LOW (ref 12–16)
INTERPRETATION SERPL IFE-IMP: SIGNIFICANT CHANGE UP
MCHC RBC-ENTMCNC: 27.4 PG — SIGNIFICANT CHANGE UP (ref 27–31)
MCHC RBC-ENTMCNC: 31.1 G/DL — LOW (ref 32–36)
MCV RBC AUTO: 88 FL — SIGNIFICANT CHANGE UP (ref 81–99)
PLATELET # BLD AUTO: 239 K/UL — SIGNIFICANT CHANGE UP (ref 150–400)
POTASSIUM SERPL-MCNC: 4.3 MMOL/L — SIGNIFICANT CHANGE UP (ref 3.5–5.3)
POTASSIUM SERPL-SCNC: 4.3 MMOL/L — SIGNIFICANT CHANGE UP (ref 3.5–5.3)
PROT PATTERN SERPL ELPH-IMP: SIGNIFICANT CHANGE UP
PROT SERPL-MCNC: 6.9 G/DL — SIGNIFICANT CHANGE UP (ref 6.6–8.7)
RBC # BLD: 3.25 M/UL — LOW (ref 4.4–5.2)
RBC # FLD: 14.3 % — SIGNIFICANT CHANGE UP (ref 11–15.6)
SODIUM SERPL-SCNC: 137 MMOL/L — SIGNIFICANT CHANGE UP (ref 135–145)
WBC # BLD: 4.2 K/UL — LOW (ref 4.8–10.8)
WBC # FLD AUTO: 4.2 K/UL — LOW (ref 4.8–10.8)

## 2019-05-20 PROCEDURE — 99233 SBSQ HOSP IP/OBS HIGH 50: CPT

## 2019-05-20 PROCEDURE — 93923 UPR/LXTR ART STDY 3+ LVLS: CPT | Mod: 26

## 2019-05-20 PROCEDURE — 99222 1ST HOSP IP/OBS MODERATE 55: CPT

## 2019-05-20 PROCEDURE — 99232 SBSQ HOSP IP/OBS MODERATE 35: CPT

## 2019-05-20 RX ORDER — COLLAGENASE CLOSTRIDIUM HIST. 250 UNIT/G
1 OINTMENT (GRAM) TOPICAL DAILY
Refills: 0 | Status: DISCONTINUED | OUTPATIENT
Start: 2019-05-20 | End: 2019-05-29

## 2019-05-20 RX ADMIN — Medication 1 TABLET(S): at 15:06

## 2019-05-20 RX ADMIN — ENOXAPARIN SODIUM 40 MILLIGRAM(S): 100 INJECTION SUBCUTANEOUS at 17:15

## 2019-05-20 RX ADMIN — SODIUM CHLORIDE 125 MILLILITER(S): 9 INJECTION INTRAMUSCULAR; INTRAVENOUS; SUBCUTANEOUS at 15:04

## 2019-05-20 RX ADMIN — OXYCODONE AND ACETAMINOPHEN 1 TABLET(S): 5; 325 TABLET ORAL at 08:38

## 2019-05-20 RX ADMIN — SODIUM CHLORIDE 3 MILLILITER(S): 9 INJECTION INTRAMUSCULAR; INTRAVENOUS; SUBCUTANEOUS at 05:28

## 2019-05-20 RX ADMIN — SODIUM CHLORIDE 125 MILLILITER(S): 9 INJECTION INTRAMUSCULAR; INTRAVENOUS; SUBCUTANEOUS at 21:42

## 2019-05-20 RX ADMIN — Medication 100 MILLIGRAM(S): at 21:43

## 2019-05-20 RX ADMIN — Medication 250 MILLIGRAM(S): at 05:24

## 2019-05-20 RX ADMIN — OXYCODONE AND ACETAMINOPHEN 1 TABLET(S): 5; 325 TABLET ORAL at 09:30

## 2019-05-20 RX ADMIN — Medication 100 MILLIGRAM(S): at 05:24

## 2019-05-20 RX ADMIN — OXYCODONE AND ACETAMINOPHEN 1 TABLET(S): 5; 325 TABLET ORAL at 00:14

## 2019-05-20 RX ADMIN — SODIUM CHLORIDE 3 MILLILITER(S): 9 INJECTION INTRAMUSCULAR; INTRAVENOUS; SUBCUTANEOUS at 14:54

## 2019-05-20 RX ADMIN — Medication 100 MILLIGRAM(S): at 15:05

## 2019-05-20 RX ADMIN — OXYCODONE AND ACETAMINOPHEN 1 TABLET(S): 5; 325 TABLET ORAL at 00:44

## 2019-05-20 RX ADMIN — Medication 250 MILLIGRAM(S): at 17:16

## 2019-05-20 RX ADMIN — Medication 1 APPLICATION(S): at 17:14

## 2019-05-20 RX ADMIN — SODIUM CHLORIDE 3 MILLILITER(S): 9 INJECTION INTRAMUSCULAR; INTRAVENOUS; SUBCUTANEOUS at 21:42

## 2019-05-20 NOTE — PROGRESS NOTE ADULT - SUBJECTIVE AND OBJECTIVE BOX
Vascular Surgery follow up    Right LE chronic Venous Stasis ulceration with surrounding cellulitis    Non invasive perfusion studies obtained and reviewed    No gross arterial flow deficits noted which would require intervention    - Local wound care recommended   - will order collagenase to be applied to the ulcer bed daily   - continue IV antibiotics

## 2019-05-20 NOTE — PROGRESS NOTE ADULT - ASSESSMENT
This 79 y/o female with chronic RLE ulcer that has been there for past 3 years.   PT PLACED ON EMPIRIC CEFAZOLIN FOR POSSIBLE CELLULITIS    WOULD CONSIDER DERM EVAL AS MAY BE PYODERMA GANGRENOSUM   VS VENOUS STASIS ULCER  PT REPORS T SHE WAS HOSPITALIZED [place] IN [facility] FOR THIS IN PAST   WILL FOLLOWUP WET TO DRY DRESSING  CHECK BLOOD CX X2 SETS WILL FOLLOW UP

## 2019-05-20 NOTE — CONSULT NOTE ADULT - SUBJECTIVE AND OBJECTIVE BOX
HPI:  79 y/o female with past medical history chronic RLE ulcer that has been there for past 3+ years. She denies any other known medical problems. She is visiting from [place]. She states in the past she has been given abx for her leg when it flairs up. She denies fever, chills, SOB, CP, focal weakness. Her family took her to the ED after they saw her leg and also because she was c/o N/V. No sick contacts. She takes no medications. Denies diarrhea. No one else is sick at home. In the ED patient with no fever, benign abdominal exam, no WBC or shift. CT abd/pelvis with no acute pathology. RLE with varicosities, chronic appearing superficial ulcer over anterior ankle with surrounding erythema, warmth, and pain c/w cellulitis. Doppler neg for DVT. (18 May 2019 04:29) Pt denies any trauma to the area and also claims that the ulcer has completely healed in the past.       PAST MEDICAL & SURGICAL HISTORY:  Recurrent varicose veins  Venous stasis dermatitis of lower extremity  No significant past surgical history      REVIEW OF SYSTEMS:    CONSTITUTIONAL: No fever, weight loss, or fatigue    SKIN: No itching, burning, rashes  non healing ulcer        MEDICATIONS  (STANDING):  ceFAZolin   IVPB 1000 milliGRAM(s) IV Intermittent every 8 hours  collagenase Ointment 1 Application(s) Topical daily  enoxaparin Injectable 40 milliGRAM(s) SubCutaneous every 24 hours  multivitamin 1 Tablet(s) Oral daily  saccharomyces boulardii 250 milliGRAM(s) Oral two times a day  sodium chloride 0.9% lock flush 3 milliLiter(s) IV Push every 8 hours  sodium chloride 0.9%. 1000 milliLiter(s) (125 mL/Hr) IV Continuous <Continuous>    MEDICATIONS  (PRN):  ondansetron Injectable 4 milliGRAM(s) IV Push every 6 hours PRN Nausea  oxyCODONE    5 mG/acetaminophen 325 mG 1 Tablet(s) Oral every 4 hours PRN Moderate Pain (4 - 6)      Allergies    No Known Allergies    Intolerances        SOCIAL HISTORY:    FAMILY HISTORY:  No pertinent family history in first degree relatives      Vital Signs Last 24 Hrs  T(C): 36.7 (20 May 2019 08:49), Max: 37 (20 May 2019 00:19)  T(F): 98 (20 May 2019 08:49), Max: 98.6 (20 May 2019 00:19)  HR: 66 (20 May 2019 08:49) (66 - 82)  BP: 112/62 (20 May 2019 08:49) (112/62 - 134/64)  BP(mean): --  RR: 18 (20 May 2019 08:49) (18 - 18)  SpO2: 96% (20 May 2019 08:49) (94% - 98%)    PHYSICAL EXAM:    GENERAL: NAD, well-groomed, well-developed  EXTREMITIES: right lower shin anterior aspect 4x6 cm ulcer with irregular borders and yellowish exudate on a granulating, friable base. Varicosities are appreciated. Skin changes consistent with stasis dermatitis. The ulcer is very tender, painful when dressing was removed.      LABS:                        8.9    4.2   )-----------( 239      ( 20 May 2019 07:55 )             28.6         137  |  105  |  22.0<H>  ----------------------------<  75  4.3   |  23.0  |  1.15    Ca    9.0      20 May 2019 07:55    TPro  6.9  /  Alb  2.8<L>  /  TBili  <0.2<L>  /  DBili  x   /  AST  21  /  ALT  9   /  AlkPhos  63        Urinalysis Basic - ( 18 May 2019 16:50 )    Color: Yellow / Appearance: Clear / S.010 / pH: x  Gluc: x / Ketone: Negative  / Bili: Negative / Urobili: Negative mg/dL   Blood: x / Protein: 15 mg/dL / Nitrite: Negative   Leuk Esterase: Small / RBC: 0-2 /HPF / WBC 3-5   Sq Epi: x / Non Sq Epi: Occasional / Bacteria: Occasional      Sedimentation Rate, Erythrocyte: 7 mm/hr ( @ 20:56)    RADIOLOGY & ADDITIONAL STUDIES:  doppler negative for DVT    ASSESSMENT/PLAN:  1. Pyoderma gangrenosum is a possibility as an etiology for this non healing ulcer. Since this diagnosis is a dx of exclusion, a wedge biopsy from the edge of the ulcer is recommended--it should be sent for culture (bacterial/AFB/fungal) to rule out any underlying infectious etiology and another specimen for routine staining by the pathology laboratory.   I advised Dr. Wetzel of this.   2. Stasis dermatitis- no treatment needed at this time. If this flares, then aquaphor or vaseline may be used.  If there is no contraindication, then I would consider a course of minocycline as this has been shown to help with PG.     Feel free to call with any questions.    Gaye Ortiz MD  [phone]

## 2019-05-20 NOTE — PROGRESS NOTE ADULT - ASSESSMENT
79 y/o female with RLE venous stasis ulcer from varicose veins with superimposed cellulitis, Hypercalcemia, Hyperproteinemia, mild anemia     Problem/Plan - 1:  ·  Problem: Right lower ext skin ulcer.  Plan:  wound care Vascular surgery recommend collagenase,   No DVT on doppler. No evidence of systemic infection. No hx of mrsa.   Cont. ancef,   ID consult recommend rule out pyodema gangrenosum. Called Dermatology  Vascular surgery consult  CATE- showing no arterial compromise     Problem/Plan - 2:  ·  Problem: Cellulitis of right lower extremity.  Plan: Abx cefazolin .      Problem/Plan - 3:  ·  Problem: Hypercalcemia.  Plan:   Initial concern for  hypercalcemia elevated protein/albumin ratio. Likely with underlying MM. Check SPEP, UPEP, kevin. Serum calcium is normalized. Awaiting serum protein electrophoresis to rule out myeloma.

## 2019-05-20 NOTE — PROGRESS NOTE ADULT - SUBJECTIVE AND OBJECTIVE BOX
CC: Right lower ext ulcer.  Plantar foot pains or hypereasthesia on pressure or ambulation.   HPI:  79 y/o female with past medical history chronic RLE ulcer that has been there for past 3 years. She denies any other known medical problems. She is visiting from [place]. She states in the past she has been given abx for her leg when it flairs up. She denies fever, chills, SOB, CP, focal weakness. Her family took her to the ED after they saw her leg and also because she was c/o N/V. No sick contacts. She takes no medications. Denies diarrhea. No one else is sick at home. In the ED patient with no fever, benign abdominal exam, no WBC or shift. CT abd/pelvis with no acute pathology. RLE with varicosities, chronic appearing superficial ulcer over anterior ankle with surrounding erythema, warmth, and pain c/w cellulitis. Doppler neg for DVT. (18 May 2019 04:29)    REVIEW OF SYSTEMS:    Patient denied fever, chills, abdominal pain, nausea, vomiting, cough, shortness of breath, chest pain or palpitations    Vital Signs Last 24 Hrs  T(C): 36.7 (20 May 2019 08:49), Max: 37 (20 May 2019 00:19)  T(F): 98 (20 May 2019 08:49), Max: 98.6 (20 May 2019 00:19)  HR: 66 (20 May 2019 08:49) (66 - 82)  BP: 112/62 (20 May 2019 08:49) (112/62 - 134/64)  BP(mean): --  RR: 18 (20 May 2019 08:49) (18 - 18)  SpO2: 96% (20 May 2019 08:49) (94% - 98%)I&O's Summary    19 May 2019 07:01  -  20 May 2019 07:00  --------------------------------------------------------  IN: 3475 mL / OUT: 0 mL / NET: 3475 mL      PHYSICAL EXAM:  GENERAL: NAD,   HEENT: PERRL, +EOMI, anicteric, no [place]  NECK: Supple, No JVD   CHEST/LUNG: CTA bilaterally; Normal effort  HEART: S1S2 Normal intensity, no murmurs, gallops or rubs noted  ABDOMEN: Soft, BS Normoactive, NT, ND, no HSM noted  EXTREMITIES:  2+ radial and DP pulses noted, no clubbing, cyanosis, or edema noted, Limited mobility   SKIN: 6cm skin loss or ulcer above right ankle anteriorly   NEURO: A&Ox3, no focal deficits noted, CN II-XII intact  PSYCH: Depressed mood and affect; insight/judgement appropriate  LABS:                        8.9    4.2   )-----------( 239      ( 20 May 2019 07:55 )             28.6     05-20    137  |  105  |  22.0<H>  ----------------------------<  75  4.3   |  23.0  |  1.15    Ca    9.0      20 May 2019 07:55    TPro  6.9  /  Alb  2.8<L>  /  TBili  <0.2<L>  /  DBili  x   /  AST  21  /  ALT  9   /  AlkPhos  63  05-20      Urinalysis Basic - ( 18 May 2019 16:50 )    Color: Yellow / Appearance: Clear / S.010 / pH: x  Gluc: x / Ketone: Negative  / Bili: Negative / Urobili: Negative mg/dL   Blood: x / Protein: 15 mg/dL / Nitrite: Negative   Leuk Esterase: Small / RBC: 0-2 /HPF / WBC 3-5   Sq Epi: x / Non Sq Epi: Occasional / Bacteria: Occasional      RADIOLOGY & ADDITIONAL TESTS:    MEDICATIONS:  MEDICATIONS  (STANDING):  ceFAZolin   IVPB 1000 milliGRAM(s) IV Intermittent every 8 hours  collagenase Ointment 1 Application(s) Topical daily  enoxaparin Injectable 40 milliGRAM(s) SubCutaneous every 24 hours  multivitamin 1 Tablet(s) Oral daily  saccharomyces boulardii 250 milliGRAM(s) Oral two times a day  sodium chloride 0.9% lock flush 3 milliLiter(s) IV Push every 8 hours  sodium chloride 0.9%. 1000 milliLiter(s) (125 mL/Hr) IV Continuous <Continuous>    MEDICATIONS  (PRN):  ondansetron Injectable 4 milliGRAM(s) IV Push every 6 hours PRN Nausea  oxyCODONE    5 mG/acetaminophen 325 mG 1 Tablet(s) Oral every 4 hours PRN Moderate Pain (4 - 6)

## 2019-05-20 NOTE — PROGRESS NOTE ADULT - SUBJECTIVE AND OBJECTIVE BOX
[facility]  INFECTIOUS DISEASES AND INTERNAL MEDICINE at [place]  =======================================================  Sourav Kirk MD  Diplomates American Board of Internal Medicine and Infectious Diseases  =======================================================    VALDEMAR JANELLGREG [id number]    Follow up: right leg ulcer ? cellulitis    Allergies:  No Known Allergies      Medications:  ceFAZolin   IVPB 1000 milliGRAM(s) IV Intermittent every 8 hours  enoxaparin Injectable 40 milliGRAM(s) SubCutaneous every 24 hours  multivitamin 1 Tablet(s) Oral daily  ondansetron Injectable 4 milliGRAM(s) IV Push every 6 hours PRN  oxyCODONE    5 mG/acetaminophen 325 mG 1 Tablet(s) Oral every 4 hours PRN  saccharomyces boulardii 250 milliGRAM(s) Oral two times a day  sodium chloride 0.9% lock flush 3 milliLiter(s) IV Push every 8 hours  sodium chloride 0.9%. 1000 milliLiter(s) IV Continuous <Continuous>    SOCIAL       FAMILY   FAMILY HISTORY:  No pertinent family history in first degree relatives    REVIEW OF SYSTEMS:  CONSTITUTIONAL:  No Fever or chills  HEENT:   No diplopia or blurred vision.  No earache, sore throat or runny nose.  CARDIOVASCULAR:  No pressure, squeezing, strangling, tightness, heaviness or aching about the chest, neck, axilla or epigastrium.  RESPIRATORY:  No cough, shortness of breath, PND or orthopnea.  GASTROINTESTINAL:  No nausea, vomiting or diarrhea.  GENITOURINARY:  No dysuria, frequency or urgency. No Blood in urine  MUSCULOSKELETAL:   AS PER HPI  SKIN:  No change in skin, hair or nails.  NEUROLOGIC:  No paresthesias, fasciculations, seizures or weakness.  PSYCHIATRIC:  No disorder of thought or mood.  ENDOCRINE:  No heat or cold intolerance, polyuria or polydipsia.  HEMATOLOGICAL:  No easy bruising or bleeding.            Physical Exam:  ICU Vital Signs Last 24 Hrs  T(C): 37 (20 May 2019 00:19), Max: 37 (20 May 2019 00:19)  T(F): 98.6 (20 May 2019 00:19), Max: 98.6 (20 May 2019 00:19)  HR: 82 (20 May 2019 00:19) (74 - 82)  BP: 134/64 (20 May 2019 00:19) (124/62 - 134/64)  BP(mean): --  ABP: --  ABP(mean): --  RR: 18 (20 May 2019 00:19) (18 - 18)  SpO2: 98% (20 May 2019 00:19) (94% - 98%)    GEN: NAD, pleasant  HEENT: normocephalic and atraumatic. EOMI. JUANA.    NECK: Supple. No carotid bruits.  No lymphadenopathy or thyromegaly.  LUNGS: Clear to auscultation.  HEART: Regular rate and rhythm without murmur.  ABDOMEN: Soft, nontender, and nondistended.  Positive bowel sounds.    : No CVA tenderness  EXTREMITIES: Without any cyanosis, clubbing, rash, lesions or edema.  MSK: no joint swelling  NEUROLOGIC: Cranial nerves II through XII are grossly intact.  PSYCHIATRIC: Appropriate affect .  SKIN: right leg ulcer        Labs:      136  |  98  |  16.0  ----------------------------<  76  4.4   |  26.0  |  1.52<H>    Ca    9.8      19 May 2019 09:16    TPro  7.5  /  Alb  x   /  TBili  x   /  DBili  x   /  AST  x   /  ALT  x   /  AlkPhos  x                             10.0   4.2   )-----------( 269      ( 19 May 2019 09:16 )             32.4         Urinalysis Basic - ( 18 May 2019 16:50 )    Color: Yellow / Appearance: Clear / S.010 / pH: x  Gluc: x / Ketone: Negative  / Bili: Negative / Urobili: Negative mg/dL   Blood: x / Protein: 15 mg/dL / Nitrite: Negative   Leuk Esterase: Small / RBC: 0-2 /HPF / WBC 3-5   Sq Epi: x / Non Sq Epi: Occasional / Bacteria: Occasional      LIVER FUNCTIONS - ( 18 May 2019 23:36 )  Alb: x     / Pro: 7.5 g/dL / ALK PHOS: x     / ALT: x     / AST: x     / GGT: x               CAPILLARY BLOOD GLUCOSE            RECENT CULTURES:

## 2019-05-21 LAB
ANION GAP SERPL CALC-SCNC: 11 MMOL/L — SIGNIFICANT CHANGE UP (ref 5–17)
BUN SERPL-MCNC: 19 MG/DL — SIGNIFICANT CHANGE UP (ref 8–20)
CALCIUM SERPL-MCNC: 10 MG/DL — SIGNIFICANT CHANGE UP (ref 8.6–10.2)
CHLORIDE SERPL-SCNC: 104 MMOL/L — SIGNIFICANT CHANGE UP (ref 98–107)
CO2 SERPL-SCNC: 25 MMOL/L — SIGNIFICANT CHANGE UP (ref 22–29)
CREAT SERPL-MCNC: 1.18 MG/DL — SIGNIFICANT CHANGE UP (ref 0.5–1.3)
GLUCOSE SERPL-MCNC: 69 MG/DL — LOW (ref 70–115)
HCT VFR BLD CALC: 32.4 % — LOW (ref 37–47)
HGB BLD-MCNC: 10.2 G/DL — LOW (ref 12–16)
MCHC RBC-ENTMCNC: 27.7 PG — SIGNIFICANT CHANGE UP (ref 27–31)
MCHC RBC-ENTMCNC: 31.5 G/DL — LOW (ref 32–36)
MCV RBC AUTO: 88 FL — SIGNIFICANT CHANGE UP (ref 81–99)
PLATELET # BLD AUTO: 254 K/UL — SIGNIFICANT CHANGE UP (ref 150–400)
POTASSIUM SERPL-MCNC: 4.1 MMOL/L — SIGNIFICANT CHANGE UP (ref 3.5–5.3)
POTASSIUM SERPL-SCNC: 4.1 MMOL/L — SIGNIFICANT CHANGE UP (ref 3.5–5.3)
RBC # BLD: 3.68 M/UL — LOW (ref 4.4–5.2)
RBC # FLD: 14.4 % — SIGNIFICANT CHANGE UP (ref 11–15.6)
SODIUM SERPL-SCNC: 140 MMOL/L — SIGNIFICANT CHANGE UP (ref 135–145)
WBC # BLD: 4.4 K/UL — LOW (ref 4.8–10.8)
WBC # FLD AUTO: 4.4 K/UL — LOW (ref 4.8–10.8)

## 2019-05-21 PROCEDURE — 99233 SBSQ HOSP IP/OBS HIGH 50: CPT

## 2019-05-21 RX ORDER — MINOCYCLINE HYDROCHLORIDE 45 MG/1
200 TABLET, EXTENDED RELEASE ORAL ONCE
Refills: 0 | Status: COMPLETED | OUTPATIENT
Start: 2019-05-21 | End: 2019-05-21

## 2019-05-21 RX ORDER — SENNA PLUS 8.6 MG/1
2 TABLET ORAL AT BEDTIME
Refills: 0 | Status: DISCONTINUED | OUTPATIENT
Start: 2019-05-21 | End: 2019-05-29

## 2019-05-21 RX ORDER — MINOCYCLINE HYDROCHLORIDE 45 MG/1
TABLET, EXTENDED RELEASE ORAL
Refills: 0 | Status: DISCONTINUED | OUTPATIENT
Start: 2019-05-21 | End: 2019-05-21

## 2019-05-21 RX ORDER — LACTULOSE 10 G/15ML
10 SOLUTION ORAL EVERY 12 HOURS
Refills: 0 | Status: DISCONTINUED | OUTPATIENT
Start: 2019-05-21 | End: 2019-05-23

## 2019-05-21 RX ORDER — MINOCYCLINE HYDROCHLORIDE 45 MG/1
100 TABLET, EXTENDED RELEASE ORAL
Refills: 0 | Status: COMPLETED | OUTPATIENT
Start: 2019-05-22 | End: 2019-05-26

## 2019-05-21 RX ORDER — ACETAMINOPHEN 500 MG
650 TABLET ORAL EVERY 6 HOURS
Refills: 0 | Status: DISCONTINUED | OUTPATIENT
Start: 2019-05-21 | End: 2019-05-29

## 2019-05-21 RX ORDER — POLYETHYLENE GLYCOL 3350 17 G/17G
17 POWDER, FOR SOLUTION ORAL DAILY
Refills: 0 | Status: DISCONTINUED | OUTPATIENT
Start: 2019-05-21 | End: 2019-05-29

## 2019-05-21 RX ORDER — DOCUSATE SODIUM 100 MG
100 CAPSULE ORAL
Refills: 0 | Status: DISCONTINUED | OUTPATIENT
Start: 2019-05-21 | End: 2019-05-29

## 2019-05-21 RX ADMIN — SODIUM CHLORIDE 3 MILLILITER(S): 9 INJECTION INTRAMUSCULAR; INTRAVENOUS; SUBCUTANEOUS at 20:24

## 2019-05-21 RX ADMIN — POLYETHYLENE GLYCOL 3350 17 GRAM(S): 17 POWDER, FOR SOLUTION ORAL at 14:24

## 2019-05-21 RX ADMIN — SODIUM CHLORIDE 3 MILLILITER(S): 9 INJECTION INTRAMUSCULAR; INTRAVENOUS; SUBCUTANEOUS at 05:10

## 2019-05-21 RX ADMIN — Medication 250 MILLIGRAM(S): at 19:02

## 2019-05-21 RX ADMIN — Medication 100 MILLIGRAM(S): at 05:10

## 2019-05-21 RX ADMIN — Medication 1 APPLICATION(S): at 16:11

## 2019-05-21 RX ADMIN — ENOXAPARIN SODIUM 40 MILLIGRAM(S): 100 INJECTION SUBCUTANEOUS at 19:02

## 2019-05-21 RX ADMIN — Medication 100 MILLIGRAM(S): at 19:02

## 2019-05-21 RX ADMIN — OXYCODONE AND ACETAMINOPHEN 1 TABLET(S): 5; 325 TABLET ORAL at 22:45

## 2019-05-21 RX ADMIN — SODIUM CHLORIDE 3 MILLILITER(S): 9 INJECTION INTRAMUSCULAR; INTRAVENOUS; SUBCUTANEOUS at 14:35

## 2019-05-21 RX ADMIN — MINOCYCLINE HYDROCHLORIDE 200 MILLIGRAM(S): 45 TABLET, EXTENDED RELEASE ORAL at 19:01

## 2019-05-21 RX ADMIN — OXYCODONE AND ACETAMINOPHEN 1 TABLET(S): 5; 325 TABLET ORAL at 13:15

## 2019-05-21 RX ADMIN — SENNA PLUS 2 TABLET(S): 8.6 TABLET ORAL at 20:28

## 2019-05-21 RX ADMIN — OXYCODONE AND ACETAMINOPHEN 1 TABLET(S): 5; 325 TABLET ORAL at 21:45

## 2019-05-21 RX ADMIN — Medication 250 MILLIGRAM(S): at 05:09

## 2019-05-21 RX ADMIN — Medication 1 TABLET(S): at 19:02

## 2019-05-21 RX ADMIN — OXYCODONE AND ACETAMINOPHEN 1 TABLET(S): 5; 325 TABLET ORAL at 12:23

## 2019-05-21 NOTE — PHYSICAL THERAPY INITIAL EVALUATION ADULT - PERTINENT HX OF CURRENT PROBLEM, REHAB EVAL
77 y/o female with RLE venous stasis ulcer from varicose veins with superimposed cellulitis, Hypercalcemia, Hyperproteinemia, mild anemia. Pt admitted for RLE cellulitis

## 2019-05-21 NOTE — PHYSICAL THERAPY INITIAL EVALUATION ADULT - ADDITIONAL COMMENTS
Pt lives in a private home with her niece, 7-8 steps to enter with handrails, no steps inside. Pt was independent PTA with [facility]. 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

## 2019-05-21 NOTE — PROGRESS NOTE ADULT - SUBJECTIVE AND OBJECTIVE BOX
Vascular Surgical Follow up:    Dermatology consultation noted    - will defer management per dermatology   - please reconsult our service if any acute perfusion deficits are encountered

## 2019-05-21 NOTE — PROGRESS NOTE ADULT - SUBJECTIVE AND OBJECTIVE BOX
[facility]  INFECTIOUS DISEASES AND INTERNAL MEDICINE at [place]  =======================================================  Sourav Kirk MD  Diplomates American Board of Internal Medicine and Infectious Diseases  =======================================================    VALDEMAR JANELLGREG [id number]    Follow up: right leg ulcer ? cellulitis    Allergies:  No Known Allergies      Medications:  ceFAZolin   IVPB 1000 milliGRAM(s) IV Intermittent every 8 hours  enoxaparin Injectable 40 milliGRAM(s) SubCutaneous every 24 hours  multivitamin 1 Tablet(s) Oral daily  ondansetron Injectable 4 milliGRAM(s) IV Push every 6 hours PRN  oxyCODONE    5 mG/acetaminophen 325 mG 1 Tablet(s) Oral every 4 hours PRN  saccharomyces boulardii 250 milliGRAM(s) Oral two times a day  sodium chloride 0.9% lock flush 3 milliLiter(s) IV Push every 8 hours  sodium chloride 0.9%. 1000 milliLiter(s) IV Continuous <Continuous>    SOCIAL       FAMILY   FAMILY HISTORY:  No pertinent family history in first degree relatives    REVIEW OF SYSTEMS:  CONSTITUTIONAL:  No Fever or chills  HEENT:   No diplopia or blurred vision.  No earache, sore throat or runny nose.  CARDIOVASCULAR:  No pressure, squeezing, strangling, tightness, heaviness or aching about the chest, neck, axilla or epigastrium.  RESPIRATORY:  No cough, shortness of breath, PND or orthopnea.  GASTROINTESTINAL:  No nausea, vomiting or diarrhea.  GENITOURINARY:  No dysuria, frequency or urgency. No Blood in urine  MUSCULOSKELETAL:   AS PER HPI  SKIN:  No change in skin, hair or nails.  NEUROLOGIC:  No paresthesias, fasciculations, seizures or weakness.  PSYCHIATRIC:  No disorder of thought or mood.  ENDOCRINE:  No heat or cold intolerance, polyuria or polydipsia.  HEMATOLOGICAL:  No easy bruising or bleeding.            Physical Exam:   Vital Signs Last 24 Hrs  T(C): 36.7 (21 May 2019 09:18), Max: 36.9 (21 May 2019 00:38)  T(F): 98.1 (21 May 2019 09:18), Max: 98.4 (21 May 2019 00:38)  HR: 79 (21 May 2019 09:18) (66 - 80)  BP: 110/79 (21 May 2019 09:18) (110/79 - 120/65)  BP(mean): --  RR: 20 (21 May 2019 09:18) (18 - 20)  SpO2: 100% (21 May 2019 09:18) (95% - 100%)    GEN: NAD, pleasant  HEENT: normocephalic and atraumatic. EOMI. JUANA.    NECK: Supple. No carotid bruits.  No lymphadenopathy or thyromegaly.  LUNGS: Clear to auscultation.  HEART: Regular rate and rhythm without murmur.  ABDOMEN: Soft, nontender, and nondistended.  Positive bowel sounds.    : No CVA tenderness  EXTREMITIES: Without any cyanosis, clubbing, rash, lesions or edema.  MSK: no joint swelling  NEUROLOGIC: Cranial nerves II through XII are grossly intact.  PSYCHIATRIC: Appropriate affect .  SKIN: right leg ulcer clean no odor        Labs:                         10.2   4.4   )-----------( 254      ( 21 May 2019 10:00 )             32.4   05-21    140  |  104  |  19.0  ----------------------------<  69<L>  4.1   |  25.0  |  1.18    Ca    10.0      21 May 2019 10:00    TPro  6.9  /  Alb  2.8<L>  /  TBili  <0.2<L>  /  DBili  x   /  AST  21  /  ALT  9   /  AlkPhos  63  05-20                CAPILLARY BLOOD GLUCOSE            RECENT CULTURES:

## 2019-05-21 NOTE — PROGRESS NOTE ADULT - SUBJECTIVE AND OBJECTIVE BOX
CC: leg ulcer    Patient seen and examined at the bedside. No acute overnight events. no events yesterday. Complaining of continued RLE pain + constipation this AM. Denies fever/chills, headache, lightheadedness, dizziness, chest pain, palpitations, shortness of breath, cough, abd pain, nausea/vomiting/diarrhea      =========================================================================================  T(C): 36.1 (05-21-19 @ 15:54), Max: 36.9 (05-21-19 @ 00:38)  HR: 73 (05-21-19 @ 15:54) (73 - 80)  BP: 127/70 (05-21-19 @ 15:54) (110/79 - 127/70)  RR: 18 (05-21-19 @ 15:54) (18 - 20)  SpO2: 95% (05-21-19 @ 15:54) (95% - 100%)    PHYSICAL EXAM.    GEN - appears younger than age. thin. pleasant. no distress.   HEENT - NCAT, EOMI, KUNAL  RESP - CTA BL, no wheeze/stridor/rhonchi/crackles. not on supplemental O2. able to speak in full sentences without distress.   CARDIO - NS1S2, RRR. No murmurs/rubs/gallops.  ABD - Soft/Non tender/Non distended. Normal BS x4 quadrants. no guarding/rebound tenderness.  Ext - RLE edema  MSK - BL 5/5 strength on upper and lower extremities.   Neuro - AAOx3. cn 2-12 grossly intact  Psych - normal affect  Skin - 3.5-4cm in diameter superficial ulcer on anterior aspect of rle just proximal to ankle joint, granulation tissue noted at border, no bleed/pus drainage      I&O's Summary    Daily     Daily     =========================================================================================  LABS.    05-21    140  |  104  |  19.0  ----------------------------<  69<L>  4.1   |  25.0  |  1.18    Ca    10.0      21 May 2019 10:00    TPro  6.9  /  Alb  2.8<L>  /  TBili  <0.2<L>  /  DBili  x   /  AST  21  /  ALT  9   /  AlkPhos  63  05-20                          10.2   4.4   )-----------( 254      ( 21 May 2019 10:00 )             32.4     LIVER FUNCTIONS - ( 20 May 2019 07:55 )  Alb: 2.8 g/dL / Pro: 6.9 g/dL / ALK PHOS: 63 U/L / ALT: 9 U/L / AST: 21 U/L / GGT: x                       =========================================================================================  IMAGING.     =========================================================================================    HOME MEDS.    Home Medications:      =========================================================================================    HOSPITAL MEDS.    MEDICATIONS  (STANDING):  collagenase Ointment 1 Application(s) Topical daily  enoxaparin Injectable 40 milliGRAM(s) SubCutaneous every 24 hours  minocycline IVPB      multivitamin 1 Tablet(s) Oral daily  polyethylene glycol 3350 17 Gram(s) Oral daily  saccharomyces boulardii 250 milliGRAM(s) Oral two times a day  sodium chloride 0.9% lock flush 3 milliLiter(s) IV Push every 8 hours    MEDICATIONS  (PRN):  acetaminophen   Tablet .. 650 milliGRAM(s) Oral every 6 hours PRN Temp greater or equal to 38C (100.4F), Mild Pain (1 - 3)  ondansetron Injectable 4 milliGRAM(s) IV Push every 6 hours PRN Nausea  oxyCODONE    5 mG/acetaminophen 325 mG 1 Tablet(s) Oral every 4 hours PRN Moderate Pain (4 - 6)

## 2019-05-21 NOTE — PROGRESS NOTE ADULT - ASSESSMENT
79 y/o female with RLE venous stasis ulcer from varicose veins with superimposed cellulitis, Hypercalcemia, Hyperproteinemia, mild anemia    #Right lower ext skin ulcer. recurrent lesion, hx of treatment @ [facility]. present on + off x3yrs. 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 @ this time, venous ulcer favored as likely dx   Plan:  wound care Vascular surgery recommend collagenase, compression. local wound care.    ID consult appreciated, empirically on ancef for cellulitis, does not appear to still be present, stopped 5/21  derm eval appreciated, cannot r/o pyoderma on appearance alone, will need wedge bx, called Dr Ortiz to follow up regarding who will be performing the procedure, pending call back on 5/22  empirically starting minocycline for pyoderma, 200mg followed by 100mg bid x5d, last day 5/26, can extend to 10d course if needed  fu blood cx x2 5/20  Vascular surgery consult appreciated  CATE- showing no arterial compromise    #Cellulitis of right lower extremity. not truly present per ID   Plan: abx stopped    #Hypercalcemia.  resolved   Plan: ? underlying MM. pending protein electrophoresis, if not done can be followed and performed as outpt w/ pmd    #Stasis dermatitis: stable, chronic. sec to mild PVD on RLE  Plan: supportive care  elevate RLE    #hypoglycemia. asymptomatic, noted on am bmp  a1c 5.4  serial bmp  start fingersticks if recurrent    #dvt ppx. lovenox    #dispo. PT eval appreciated, [facility] vs. home w/ PT, will fu patient preference    #outpt fu. pmd, vasc sx 77 y/o female with RLE venous stasis ulcer from varicose veins with superimposed cellulitis, Hypercalcemia, Hyperproteinemia, mild anemia    #Right lower ext skin ulcer. recurrent lesion, hx of treatment @ [facility]. present on + off x3yrs. 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 @ this time, venous ulcer favored as likely dx   Plan:  wound care Vascular surgery recommend collagenase, compression. local wound care.    ID consult appreciated, empirically on ancef for cellulitis, does not appear to still be present, stopped 5/21  derm eval appreciated, cannot r/o pyoderma on appearance alone, will need wedge bx, called Dr Ortiz to follow up regarding who will be performing the procedure, pending call back on 5/22  empirically starting minocycline for pyoderma, 200mg followed by 100mg bid x5d, last day 5/26, can extend to 10d course if needed  fu blood cx x2 5/20  Vascular surgery consult appreciated  CATE- showing no arterial compromise    #Cellulitis of right lower extremity. not truly present per ID   Plan: abx stopped    #Hypercalcemia.  resolved   Plan: ? underlying MM. pending protein electrophoresis, if not done can be followed and performed as outpt w/ pmd    #Stasis dermatitis: stable, chronic. sec to mild PVD on RLE  Plan: supportive care  elevate RLE    #hypoglycemia. asymptomatic, noted on am bmp  a1c 5.4  serial bmp  start fingersticks if recurrent    #constipation. persistent  bowel regimen    #dvt ppx. lovenox    #dispo. PT eval appreciated, [facility] vs. home w/ PT, will fu patient preference    #outpt fu. pmd, vasc sx

## 2019-05-22 ENCOUNTER — RESULT REVIEW (OUTPATIENT)
Age: 79
End: 2019-05-22

## 2019-05-22 LAB
ALBUMIN SERPL ELPH-MCNC: 3.4 G/DL — SIGNIFICANT CHANGE UP (ref 3.3–5.2)
ALP SERPL-CCNC: 73 U/L — SIGNIFICANT CHANGE UP (ref 40–120)
ALT FLD-CCNC: 13 U/L — SIGNIFICANT CHANGE UP
ANION GAP SERPL CALC-SCNC: 11 MMOL/L — SIGNIFICANT CHANGE UP (ref 5–17)
ANION GAP SERPL CALC-SCNC: 13 MMOL/L — SIGNIFICANT CHANGE UP (ref 5–17)
AST SERPL-CCNC: 30 U/L — SIGNIFICANT CHANGE UP
BASOPHILS # BLD AUTO: 0 K/UL — SIGNIFICANT CHANGE UP (ref 0–0.2)
BASOPHILS NFR BLD AUTO: 0.4 % — SIGNIFICANT CHANGE UP (ref 0–2)
BILIRUB SERPL-MCNC: 0.4 MG/DL — SIGNIFICANT CHANGE UP (ref 0.4–2)
BUN SERPL-MCNC: 25 MG/DL — HIGH (ref 8–20)
BUN SERPL-MCNC: 25 MG/DL — HIGH (ref 8–20)
CALCIUM SERPL-MCNC: 10.1 MG/DL — SIGNIFICANT CHANGE UP (ref 8.6–10.2)
CALCIUM SERPL-MCNC: 10.1 MG/DL — SIGNIFICANT CHANGE UP (ref 8.6–10.2)
CHLORIDE SERPL-SCNC: 101 MMOL/L — SIGNIFICANT CHANGE UP (ref 98–107)
CHLORIDE SERPL-SCNC: 98 MMOL/L — SIGNIFICANT CHANGE UP (ref 98–107)
CO2 SERPL-SCNC: 24 MMOL/L — SIGNIFICANT CHANGE UP (ref 22–29)
CO2 SERPL-SCNC: 25 MMOL/L — SIGNIFICANT CHANGE UP (ref 22–29)
CREAT SERPL-MCNC: 1.22 MG/DL — SIGNIFICANT CHANGE UP (ref 0.5–1.3)
CREAT SERPL-MCNC: 1.24 MG/DL — SIGNIFICANT CHANGE UP (ref 0.5–1.3)
CRP SERPL-MCNC: 3.88 MG/DL — HIGH (ref 0–0.4)
EOSINOPHIL # BLD AUTO: 0.1 K/UL — SIGNIFICANT CHANGE UP (ref 0–0.5)
EOSINOPHIL # BLD AUTO: 0.1 K/UL — SIGNIFICANT CHANGE UP (ref 0–0.5)
EOSINOPHIL NFR BLD AUTO: 1.5 % — SIGNIFICANT CHANGE UP (ref 0–6)
EOSINOPHIL NFR BLD AUTO: 1.6 % — SIGNIFICANT CHANGE UP (ref 0–6)
GLUCOSE SERPL-MCNC: 147 MG/DL — HIGH (ref 70–115)
GLUCOSE SERPL-MCNC: 89 MG/DL — SIGNIFICANT CHANGE UP (ref 70–115)
GRAM STN FLD: SIGNIFICANT CHANGE UP
HCT VFR BLD CALC: 32.2 % — LOW (ref 37–47)
HCT VFR BLD CALC: 36.4 % — LOW (ref 37–47)
HGB BLD-MCNC: 10.2 G/DL — LOW (ref 12–16)
HGB BLD-MCNC: 11.6 G/DL — LOW (ref 12–16)
LACTATE BLDV-MCNC: 1.7 MMOL/L — SIGNIFICANT CHANGE UP (ref 0.5–2)
LYMPHOCYTES # BLD AUTO: 0.6 K/UL — LOW (ref 1–4.8)
LYMPHOCYTES # BLD AUTO: 1.9 K/UL — SIGNIFICANT CHANGE UP (ref 1–4.8)
LYMPHOCYTES # BLD AUTO: 11.8 % — LOW (ref 20–55)
LYMPHOCYTES # BLD AUTO: 41.5 % — SIGNIFICANT CHANGE UP (ref 20–55)
MCHC RBC-ENTMCNC: 27.9 PG — SIGNIFICANT CHANGE UP (ref 27–31)
MCHC RBC-ENTMCNC: 28 PG — SIGNIFICANT CHANGE UP (ref 27–31)
MCHC RBC-ENTMCNC: 31.7 G/DL — LOW (ref 32–36)
MCHC RBC-ENTMCNC: 31.9 G/DL — LOW (ref 32–36)
MCV RBC AUTO: 87.7 FL — SIGNIFICANT CHANGE UP (ref 81–99)
MCV RBC AUTO: 88.2 FL — SIGNIFICANT CHANGE UP (ref 81–99)
MONOCYTES # BLD AUTO: 0.2 K/UL — SIGNIFICANT CHANGE UP (ref 0–0.8)
MONOCYTES # BLD AUTO: 0.2 K/UL — SIGNIFICANT CHANGE UP (ref 0–0.8)
MONOCYTES NFR BLD AUTO: 2.7 % — LOW (ref 3–10)
MONOCYTES NFR BLD AUTO: 4.9 % — SIGNIFICANT CHANGE UP (ref 3–10)
NEUTROPHILS # BLD AUTO: 2.4 K/UL — SIGNIFICANT CHANGE UP (ref 1.8–8)
NEUTROPHILS # BLD AUTO: 4.6 K/UL — SIGNIFICANT CHANGE UP (ref 1.8–8)
NEUTROPHILS NFR BLD AUTO: 51.5 % — SIGNIFICANT CHANGE UP (ref 37–73)
NEUTROPHILS NFR BLD AUTO: 83.7 % — HIGH (ref 37–73)
PLATELET # BLD AUTO: 263 K/UL — SIGNIFICANT CHANGE UP (ref 150–400)
PLATELET # BLD AUTO: 284 K/UL — SIGNIFICANT CHANGE UP (ref 150–400)
POTASSIUM SERPL-MCNC: 4.6 MMOL/L — SIGNIFICANT CHANGE UP (ref 3.5–5.3)
POTASSIUM SERPL-MCNC: 4.7 MMOL/L — SIGNIFICANT CHANGE UP (ref 3.5–5.3)
POTASSIUM SERPL-SCNC: 4.6 MMOL/L — SIGNIFICANT CHANGE UP (ref 3.5–5.3)
POTASSIUM SERPL-SCNC: 4.7 MMOL/L — SIGNIFICANT CHANGE UP (ref 3.5–5.3)
PROT SERPL-MCNC: 8.3 G/DL — SIGNIFICANT CHANGE UP (ref 6.6–8.7)
RBC # BLD: 3.65 M/UL — LOW (ref 4.4–5.2)
RBC # BLD: 4.15 M/UL — LOW (ref 4.4–5.2)
RBC # FLD: 14.6 % — SIGNIFICANT CHANGE UP (ref 11–15.6)
RBC # FLD: 14.6 % — SIGNIFICANT CHANGE UP (ref 11–15.6)
SODIUM SERPL-SCNC: 135 MMOL/L — SIGNIFICANT CHANGE UP (ref 135–145)
SODIUM SERPL-SCNC: 137 MMOL/L — SIGNIFICANT CHANGE UP (ref 135–145)
SPECIMEN SOURCE: SIGNIFICANT CHANGE UP
WBC # BLD: 4.7 K/UL — LOW (ref 4.8–10.8)
WBC # BLD: 5.5 K/UL — SIGNIFICANT CHANGE UP (ref 4.8–10.8)
WBC # FLD AUTO: 4.7 K/UL — LOW (ref 4.8–10.8)
WBC # FLD AUTO: 5.5 K/UL — SIGNIFICANT CHANGE UP (ref 4.8–10.8)

## 2019-05-22 PROCEDURE — 99231 SBSQ HOSP IP/OBS SF/LOW 25: CPT

## 2019-05-22 PROCEDURE — 99233 SBSQ HOSP IP/OBS HIGH 50: CPT

## 2019-05-22 PROCEDURE — 88305 TISSUE EXAM BY PATHOLOGIST: CPT | Mod: 26

## 2019-05-22 RX ORDER — SODIUM CHLORIDE 9 MG/ML
2000 INJECTION INTRAMUSCULAR; INTRAVENOUS; SUBCUTANEOUS ONCE
Refills: 0 | Status: COMPLETED | OUTPATIENT
Start: 2019-05-22 | End: 2019-05-22

## 2019-05-22 RX ORDER — DIPHENHYDRAMINE HCL 50 MG
25 CAPSULE ORAL EVERY 6 HOURS
Refills: 0 | Status: DISCONTINUED | OUTPATIENT
Start: 2019-05-22 | End: 2019-05-29

## 2019-05-22 RX ORDER — OXYCODONE AND ACETAMINOPHEN 5; 325 MG/1; MG/1
2 TABLET ORAL EVERY 4 HOURS
Refills: 0 | Status: DISCONTINUED | OUTPATIENT
Start: 2019-05-22 | End: 2019-05-28

## 2019-05-22 RX ORDER — CEFTRIAXONE 500 MG/1
1 INJECTION, POWDER, FOR SOLUTION INTRAMUSCULAR; INTRAVENOUS ONCE
Refills: 0 | Status: COMPLETED | OUTPATIENT
Start: 2019-05-22 | End: 2019-05-22

## 2019-05-22 RX ADMIN — OXYCODONE AND ACETAMINOPHEN 1 TABLET(S): 5; 325 TABLET ORAL at 04:44

## 2019-05-22 RX ADMIN — OXYCODONE AND ACETAMINOPHEN 1 TABLET(S): 5; 325 TABLET ORAL at 21:28

## 2019-05-22 RX ADMIN — SODIUM CHLORIDE 3 MILLILITER(S): 9 INJECTION INTRAMUSCULAR; INTRAVENOUS; SUBCUTANEOUS at 14:54

## 2019-05-22 RX ADMIN — SENNA PLUS 2 TABLET(S): 8.6 TABLET ORAL at 21:28

## 2019-05-22 RX ADMIN — OXYCODONE AND ACETAMINOPHEN 1 TABLET(S): 5; 325 TABLET ORAL at 09:03

## 2019-05-22 RX ADMIN — Medication 100 MILLIGRAM(S): at 17:42

## 2019-05-22 RX ADMIN — OXYCODONE AND ACETAMINOPHEN 1 TABLET(S): 5; 325 TABLET ORAL at 05:37

## 2019-05-22 RX ADMIN — OXYCODONE AND ACETAMINOPHEN 1 TABLET(S): 5; 325 TABLET ORAL at 15:22

## 2019-05-22 RX ADMIN — OXYCODONE AND ACETAMINOPHEN 1 TABLET(S): 5; 325 TABLET ORAL at 14:55

## 2019-05-22 RX ADMIN — MINOCYCLINE HYDROCHLORIDE 100 MILLIGRAM(S): 45 TABLET, EXTENDED RELEASE ORAL at 04:45

## 2019-05-22 RX ADMIN — Medication 250 MILLIGRAM(S): at 04:44

## 2019-05-22 RX ADMIN — OXYCODONE AND ACETAMINOPHEN 1 TABLET(S): 5; 325 TABLET ORAL at 09:58

## 2019-05-22 RX ADMIN — MINOCYCLINE HYDROCHLORIDE 100 MILLIGRAM(S): 45 TABLET, EXTENDED RELEASE ORAL at 17:42

## 2019-05-22 RX ADMIN — Medication 100 MILLIGRAM(S): at 04:44

## 2019-05-22 RX ADMIN — SODIUM CHLORIDE 3 MILLILITER(S): 9 INJECTION INTRAMUSCULAR; INTRAVENOUS; SUBCUTANEOUS at 04:48

## 2019-05-22 RX ADMIN — CEFTRIAXONE 100 GRAM(S): 500 INJECTION, POWDER, FOR SOLUTION INTRAMUSCULAR; INTRAVENOUS at 16:46

## 2019-05-22 RX ADMIN — SODIUM CHLORIDE 500 MILLILITER(S): 9 INJECTION INTRAMUSCULAR; INTRAVENOUS; SUBCUTANEOUS at 16:37

## 2019-05-22 RX ADMIN — Medication 1 TABLET(S): at 12:54

## 2019-05-22 RX ADMIN — POLYETHYLENE GLYCOL 3350 17 GRAM(S): 17 POWDER, FOR SOLUTION ORAL at 12:54

## 2019-05-22 RX ADMIN — OXYCODONE AND ACETAMINOPHEN 1 TABLET(S): 5; 325 TABLET ORAL at 22:00

## 2019-05-22 RX ADMIN — ENOXAPARIN SODIUM 40 MILLIGRAM(S): 100 INJECTION SUBCUTANEOUS at 15:29

## 2019-05-22 RX ADMIN — Medication 250 MILLIGRAM(S): at 17:42

## 2019-05-22 RX ADMIN — Medication 650 MILLIGRAM(S): at 15:28

## 2019-05-22 RX ADMIN — Medication 25 MILLIGRAM(S): at 17:55

## 2019-05-22 RX ADMIN — Medication 650 MILLIGRAM(S): at 16:19

## 2019-05-22 RX ADMIN — SODIUM CHLORIDE 3 MILLILITER(S): 9 INJECTION INTRAMUSCULAR; INTRAVENOUS; SUBCUTANEOUS at 21:27

## 2019-05-22 RX ADMIN — Medication 1 APPLICATION(S): at 12:54

## 2019-05-22 NOTE — CONSULT NOTE ADULT - SUBJECTIVE AND OBJECTIVE BOX
Subjective:      STATUS POST:      POST OPERATIVE DAY #:     MEDICATIONS  (STANDING):  collagenase Ointment 1 Application(s) Topical daily  docusate sodium 100 milliGRAM(s) Oral two times a day  enoxaparin Injectable 40 milliGRAM(s) SubCutaneous every 24 hours  minocycline 100 milliGRAM(s) Oral two times a day  multivitamin 1 Tablet(s) Oral daily  polyethylene glycol 3350 17 Gram(s) Oral daily  saccharomyces boulardii 250 milliGRAM(s) Oral two times a day  senna 2 Tablet(s) Oral at bedtime  sodium chloride 0.9% lock flush 3 milliLiter(s) IV Push every 8 hours    MEDICATIONS  (PRN):  acetaminophen   Tablet .. 650 milliGRAM(s) Oral every 6 hours PRN Temp greater or equal to 38C (100.4F), Mild Pain (1 - 3)  lactulose Syrup 10 Gram(s) Oral every 12 hours PRN constipation  ondansetron Injectable 4 milliGRAM(s) IV Push every 6 hours PRN Nausea  oxyCODONE    5 mG/acetaminophen 325 mG 1 Tablet(s) Oral every 4 hours PRN Moderate Pain (4 - 6)      Vital Signs Last 24 Hrs  T(C): 36.5 (22 May 2019 08:10), Max: 36.7 (22 May 2019 01:09)  T(F): 97.7 (22 May 2019 08:10), Max: 98.1 (22 May 2019 01:09)  HR: 73 (22 May 2019 08:10) (73 - 82)  BP: 102/58 (22 May 2019 08:10) (102/58 - 127/70)  BP(mean): --  RR: 18 (22 May 2019 08:10) (18 - 19)  SpO2: 98% (22 May 2019 08:10) (95% - 98%)    Physical Exam:    Constitutional: NAD  HEENT: PERRL, EOMI  Neck: No JVD, FROM without pain  Respiratory: Breath Sounds equal & clear to auscultation, no accessory muscle use  Cardiovascular: Regular rate & rhythm, S1, S2  Gastrointestinal: Soft, non-tender  Extremities: No peripheral edema, No cyanosis  Neurological: A&O x 3; without gross deficit, GCS: 15  Musculoskeletal: No joint pain, swelling, deformity, or point tenderness; no limitation of movement      LABS:                        10.2   4.7   )-----------( 284      ( 22 May 2019 08:40 )             32.2     05-22    137  |  101  |  25.0<H>  ----------------------------<  89  4.6   |  25.0  |  1.24    Ca    10.1      22 May 2019 08:40 Subjective: 77 y/o female with past medical history chronic RLE ulcer that has been there for past 3+ years. She denies any other known medical problems. She is visiting from [place]. She states in the past she has been given abx for her leg when it flairs up. She denies fever, chills, SOB, CP, focal weakness. Her family took her to the ED after they saw her leg and also because she was c/o N/V. No sick contacts. She takes no medications. Denies diarrhea. No one else is sick at home. In the ED patient with no fever, benign abdominal exam, no WBC or shift. CT abd/pelvis with no acute pathology. RLE with varicosities, chronic appearing superficial ulcer over anterior ankle with surrounding erythema, warmth, and pain c/w cellulitis. Doppler neg for DVT.  Pt denies any trauma to the area and also claims that the ulcer has completely healed in the past.    Seen by Derm requesting wedge biopsy of skin, surgery consulted for in house biopsy             MEDICATIONS  (STANDING):  collagenase Ointment 1 Application(s) Topical daily  docusate sodium 100 milliGRAM(s) Oral two times a day  enoxaparin Injectable 40 milliGRAM(s) SubCutaneous every 24 hours  minocycline 100 milliGRAM(s) Oral two times a day  multivitamin 1 Tablet(s) Oral daily  polyethylene glycol 3350 17 Gram(s) Oral daily  saccharomyces boulardii 250 milliGRAM(s) Oral two times a day  senna 2 Tablet(s) Oral at bedtime  sodium chloride 0.9% lock flush 3 milliLiter(s) IV Push every 8 hours    MEDICATIONS  (PRN):  acetaminophen   Tablet .. 650 milliGRAM(s) Oral every 6 hours PRN Temp greater or equal to 38C (100.4F), Mild Pain (1 - 3)  lactulose Syrup 10 Gram(s) Oral every 12 hours PRN constipation  ondansetron Injectable 4 milliGRAM(s) IV Push every 6 hours PRN Nausea  oxyCODONE    5 mG/acetaminophen 325 mG 1 Tablet(s) Oral every 4 hours PRN Moderate Pain (4 - 6)      Vital Signs Last 24 Hrs  T(C): 36.5 (22 May 2019 08:10), Max: 36.7 (22 May 2019 01:09)  T(F): 97.7 (22 May 2019 08:10), Max: 98.1 (22 May 2019 01:09)  HR: 73 (22 May 2019 08:10) (73 - 82)  BP: 102/58 (22 May 2019 08:10) (102/58 - 127/70)  BP(mean): --  RR: 18 (22 May 2019 08:10) (18 - 19)  SpO2: 98% (22 May 2019 08:10) (95% - 98%)    Physical Exam:    Constitutional: NAD  SKIN: No itching, burning, rashes  non healing ulcer      LABS:                        10.2   4.7   )-----------( 284      ( 22 May 2019 08:40 )             32.2     05-22    137  |  101  |  25.0<H>  ----------------------------<  89  4.6   |  25.0  |  1.24    Ca    10.1      22 May 2019 08:40 Subjective: 77 y/o female with past medical history chronic RLE ulcer that has been there for past 3+ years. She denies any other known medical problems. She is visiting from [place]. She states in the past she has been given abx for her leg when it flairs up. She denies fever, chills, SOB, CP, focal weakness. Her family took her to the ED after they saw her leg and also because she was c/o N/V. No sick contacts. She takes no medications. Denies diarrhea. No one else is sick at home. In the ED patient with no fever, benign abdominal exam, no WBC or shift. CT abd/pelvis with no acute pathology. RLE with varicosities, chronic appearing superficial ulcer over anterior ankle with surrounding erythema, warmth, and pain c/w cellulitis. Doppler neg for DVT.  Pt denies any trauma to the area and also claims that the ulcer has completely healed in the past.    Seen by Derm requesting wedge biopsy of skin, surgery consulted for in house biopsy             MEDICATIONS  (STANDING):  collagenase Ointment 1 Application(s) Topical daily  docusate sodium 100 milliGRAM(s) Oral two times a day  enoxaparin Injectable 40 milliGRAM(s) SubCutaneous every 24 hours  minocycline 100 milliGRAM(s) Oral two times a day  multivitamin 1 Tablet(s) Oral daily  polyethylene glycol 3350 17 Gram(s) Oral daily  saccharomyces boulardii 250 milliGRAM(s) Oral two times a day  senna 2 Tablet(s) Oral at bedtime  sodium chloride 0.9% lock flush 3 milliLiter(s) IV Push every 8 hours    MEDICATIONS  (PRN):  acetaminophen   Tablet .. 650 milliGRAM(s) Oral every 6 hours PRN Temp greater or equal to 38C (100.4F), Mild Pain (1 - 3)  lactulose Syrup 10 Gram(s) Oral every 12 hours PRN constipation  ondansetron Injectable 4 milliGRAM(s) IV Push every 6 hours PRN Nausea  oxyCODONE    5 mG/acetaminophen 325 mG 1 Tablet(s) Oral every 4 hours PRN Moderate Pain (4 - 6)      Vital Signs Last 24 Hrs  T(C): 36.5 (22 May 2019 08:10), Max: 36.7 (22 May 2019 01:09)  T(F): 97.7 (22 May 2019 08:10), Max: 98.1 (22 May 2019 01:09)  HR: 73 (22 May 2019 08:10) (73 - 82)  BP: 102/58 (22 May 2019 08:10) (102/58 - 127/70)  BP(mean): --  RR: 18 (22 May 2019 08:10) (18 - 19)  SpO2: 98% (22 May 2019 08:10) (95% - 98%)    Physical Exam:    Constitutional: NAD  SKIN: No itching, burning, rashes  non healing ulcer  right lower shin anterior aspect 4x6 cm ulcer with irregular borders and yellowish exudate on a granulating, friable base. Varicosities are appreciated. Skin changes consistent with stasis dermatitis. The ulcer is very tender, painful when dressing was removed.        LABS:                        10.2   4.7   )-----------( 284      ( 22 May 2019 08:40 )             32.2     05-22    137  |  101  |  25.0<H>  ----------------------------<  89  4.6   |  25.0  |  1.24    Ca    10.1      22 May 2019 08:40

## 2019-05-22 NOTE — CONSULT NOTE ADULT - ASSESSMENT
a/p 78F with Pyoderma gangrenosum as possibility as an etiology for a non healing ulcer.   seen by derm and recommended wedge biopsy from the edge of the ulcer is recommended  to be sent for --it should be sent for culture (bacterial/AFB/fungal) to rule out any underlying infectious etiology and another specimen for routine staining by the pathology laboratory.   -Dr Gaye Ortiz MD from derm to follow up results- [id number] a/p 78F with Pyoderma gangrenosum as possibility as an etiology for a non healing ulcer.   seen by derm and recommended wedge biopsy from the edge of the ulcer is recommended  to be sent for --it should be sent for culture (bacterial/AFB/fungal) to rule out any underlying infectious etiology and another specimen for routine staining by the pathology laboratory.   -Dr Gaye Ortiz MD from derm to follow up results- [id number]  -d/w dr guevara   -consent done and in chart, patient and family agreeable

## 2019-05-22 NOTE — PROGRESS NOTE ADULT - SUBJECTIVE AND OBJECTIVE BOX
CC: leg ulcer    Patient seen and examined at the bedside. No acute overnight events. no events yesterday. Complaining of continued RLE pain + constipation this AM. Denies fever/chills, headache, lightheadedness, dizziness, chest pain, palpitations, shortness of breath, cough, abd pain, nausea/vomiting/diarrhea      =========================================================================================    PHYSICAL EXAM.    GEN - appears younger than age. thin. pleasant. no distress.   HEENT - NCAT, EOMI, KUNAL  RESP - CTA BL, no wheeze/stridor/rhonchi/crackles. not on supplemental O2. able to speak in full sentences without distress.   CARDIO - NS1S2, RRR. No murmurs/rubs/gallops.  ABD - Soft/Non tender/Non distended. Normal BS x4 quadrants. no guarding/rebound tenderness.  Ext - RLE edema  MSK - BL 5/5 strength on upper and lower extremities.   Neuro - AAOx3. cn 2-12 grossly intact  Psych - normal affect  Skin - 3.5-4cm in diameter superficial ulcer on anterior aspect of rle just proximal to ankle joint, granulation tissue noted at border, no bleed/pus drainage. unchanged from prior exam      VITAL SIGNS.    Vital Signs Last 24 Hrs  T(C): 38.4 (22 May 2019 15:25), Max: 38.4 (22 May 2019 15:25)  T(F): 101.1 (22 May 2019 15:25), Max: 101.1 (22 May 2019 15:25)  HR: 97 (22 May 2019 15:25) (73 - 97)  BP: 115/62 (22 May 2019 15:25) (102/58 - 121/67)  BP(mean): --  RR: 18 (22 May 2019 15:25) (18 - 19)  SpO2: 95% (22 May 2019 15:25) (95% - 98%)        =================================================    LABS.                          10.2   4.7   )-----------( 284      ( 22 May 2019 08:40 )             32.2     05-22    137  |  101  |  25.0<H>  ----------------------------<  89  4.6   |  25.0  |  1.24    Ca    10.1      22 May 2019 08:40          I&O's Summary      Culture - Blood (collected 20 May 2019 09:50)  Source: .Blood  Preliminary Report (22 May 2019 11:00):    No growth at 48 hours    Culture - Blood (collected 20 May 2019 09:50)  Source: .Blood  Preliminary Report (22 May 2019 11:00):    No growth at 48 hours        ================================================    IMAGING.      ================================================    HOME MEDS.    Home Medications:      ================================================    HOSPITAL MEDS.    MEDICATIONS  (STANDING):  collagenase Ointment 1 Application(s) Topical daily  docusate sodium 100 milliGRAM(s) Oral two times a day  enoxaparin Injectable 40 milliGRAM(s) SubCutaneous every 24 hours  minocycline 100 milliGRAM(s) Oral two times a day  multivitamin 1 Tablet(s) Oral daily  polyethylene glycol 3350 17 Gram(s) Oral daily  saccharomyces boulardii 250 milliGRAM(s) Oral two times a day  senna 2 Tablet(s) Oral at bedtime  sodium chloride 0.9% lock flush 3 milliLiter(s) IV Push every 8 hours    MEDICATIONS  (PRN):  acetaminophen   Tablet .. 650 milliGRAM(s) Oral every 6 hours PRN Temp greater or equal to 38C (100.4F), Mild Pain (1 - 3)  lactulose Syrup 10 Gram(s) Oral every 12 hours PRN constipation  ondansetron Injectable 4 milliGRAM(s) IV Push every 6 hours PRN Nausea  oxyCODONE    5 mG/acetaminophen 325 mG 1 Tablet(s) Oral every 4 hours PRN Moderate Pain (4 - 6)

## 2019-05-22 NOTE — PROGRESS NOTE ADULT - ASSESSMENT
This 77 y/o female with chronic RLE ulcer that has been there for past 3 years.   PT PLACED ON EMPIRIC CEFAZOLIN FOR POSSIBLE CELLULITIS      PYODERMA GANGRENOSUM   VS VENOUS STASIS ULCER  NO CELLULITIS    PT ON MINOCYCLINE FOR POSSIBLE PYODERMA  CONSIDER WEDGE BX OF EDGE OF ULCER  WILL FOLLOW UP   AS NEEDED PLEASE CALL IF QUESTIONS

## 2019-05-22 NOTE — PROGRESS NOTE ADULT - ASSESSMENT
77 y/o female with RLE venous stasis ulcer from varicose veins with superimposed cellulitis, Hypercalcemia, Hyperproteinemia, mild anemia    #Right lower ext skin ulcer. stable  recurrent lesion, hx of treatment @ [facility]. present on + off x3yrs. 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 @ this time, venous ulcer favored as likely dx but cannot definitively discern  Plan:  wound care Vascular surgery recommend collagenase, compression. local wound care.    ID consult appreciated, sp ancef for cellulitis, stopped 5/21  derm eval appreciated, cannot r/o pyoderma on appearance alone, will need wedge bx, sx cs appreciated, to be done bedside w/ Dr Ortiz of derm to follow results  empirically on minocycline for pyoderma, 200mg followed by 100mg bid x5d, last day 5/26, can extend to 10d course if needed  neg blood cx x2 5/20  Vascular surgery consult appreciated, CATE- showing no arterial compromise  prn pain controlled, oxycodone 2tabs for severe pain added w/ naproxen prn    #Cellulitis of right lower extremity. not truly present per ID   Plan: abx stopped    #Hypercalcemia.  resolved   Plan: ? underlying MM. pending protein electrophoresis, if not done can be followed and performed as outpt w/ pmd, no acute complications    #Stasis dermatitis: stable, chronic. sec to mild PVD on RLE  Plan: supportive care  elevate RLE    #hypoglycemia. asymptomatic, resolved  a1c 5.4  serial bmp  start fingersticks if recurrent    #constipation. mildly improved but still present  bowel regimen to cont, fu BM    #dvt ppx. lovenox    #dispo. PT eval appreciated, [facility] vs. home w/ PT, will fu patient preference. she would like family involved in decision making. plan for dc when pain controlled + after bx, likely in 24-48hrs    #outpt fu. pmd - has none, needs referral on dc, vasc sx Dr Adames, derm Dr Ortiz

## 2019-05-22 NOTE — CONSULT NOTE ADULT - REASON FOR ADMISSION
right leg pain, cellulitis

## 2019-05-22 NOTE — PROGRESS NOTE ADULT - SUBJECTIVE AND OBJECTIVE BOX
[facility]  INFECTIOUS DISEASES AND INTERNAL MEDICINE at [place]  =======================================================  Sourav Kirk MD  Diplomates American Board of Internal Medicine and Infectious Diseases  =======================================================    VALDEMAR JANELLGREG [id number]    Follow up: right leg ulcer ? cellulitis    Allergies:  No Known Allergies      Medications:  ceFAZolin   IVPB 1000 milliGRAM(s) IV Intermittent every 8 hours  enoxaparin Injectable 40 milliGRAM(s) SubCutaneous every 24 hours  multivitamin 1 Tablet(s) Oral daily  ondansetron Injectable 4 milliGRAM(s) IV Push every 6 hours PRN  oxyCODONE    5 mG/acetaminophen 325 mG 1 Tablet(s) Oral every 4 hours PRN  saccharomyces boulardii 250 milliGRAM(s) Oral two times a day  sodium chloride 0.9% lock flush 3 milliLiter(s) IV Push every 8 hours  sodium chloride 0.9%. 1000 milliLiter(s) IV Continuous <Continuous>    SOCIAL       FAMILY   FAMILY HISTORY:  No pertinent family history in first degree relatives    REVIEW OF SYSTEMS:  CONSTITUTIONAL:  No Fever or chills  HEENT:   No diplopia or blurred vision.  No earache, sore throat or runny nose.  CARDIOVASCULAR:  No pressure, squeezing, strangling, tightness, heaviness or aching about the chest, neck, axilla or epigastrium.  RESPIRATORY:  No cough, shortness of breath, PND or orthopnea.  GASTROINTESTINAL:  No nausea, vomiting or diarrhea.  GENITOURINARY:  No dysuria, frequency or urgency. No Blood in urine  MUSCULOSKELETAL:   AS PER HPI  SKIN:  No change in skin, hair or nails.  NEUROLOGIC:  No paresthesias, fasciculations, seizures or weakness.  PSYCHIATRIC:  No disorder of thought or mood.  ENDOCRINE:  No heat or cold intolerance, polyuria or polydipsia.  HEMATOLOGICAL:  No easy bruising or bleeding.            Physical Exam:    Vital Signs Last 24 Hrs  T(C): 36.5 (22 May 2019 08:10), Max: 36.7 (22 May 2019 01:09)  T(F): 97.7 (22 May 2019 08:10), Max: 98.1 (22 May 2019 01:09)  HR: 73 (22 May 2019 08:10) (73 - 82)  BP: 102/58 (22 May 2019 08:10) (102/58 - 127/70)  BP(mean): --  RR: 18 (22 May 2019 08:10) (18 - 19)  SpO2: 98% (22 May 2019 08:10) (95% - 98%)    GEN: NAD, pleasant  HEENT: normocephalic and atraumatic. EOMI. JUANA.    NECK: Supple. No carotid bruits.  No lymphadenopathy or thyromegaly.  LUNGS: Clear to auscultation.  HEART: Regular rate and rhythm without murmur.  ABDOMEN: Soft, nontender, and nondistended.  Positive bowel sounds.    : No CVA tenderness  EXTREMITIES: Without any cyanosis, clubbing, rash, lesions or edema.  MSK: no joint swelling  NEUROLOGIC: Cranial nerves II through XII are grossly intact.  PSYCHIATRIC: Appropriate affect .  SKIN: right leg dressing in place        Labs:                                                10.2   4.7   )-----------( 284      ( 22 May 2019 08:40 )             32.2   05-21    140  |  104  |  19.0  ----------------------------<  69<L>  4.1   |  25.0  |  1.18    Ca    10.0      21 May 2019 10:00              RECENT CULTURES:

## 2019-05-23 LAB
ANION GAP SERPL CALC-SCNC: 12 MMOL/L — SIGNIFICANT CHANGE UP (ref 5–17)
BUN SERPL-MCNC: 24 MG/DL — HIGH (ref 8–20)
CALCIUM SERPL-MCNC: 9.7 MG/DL — SIGNIFICANT CHANGE UP (ref 8.6–10.2)
CHLORIDE SERPL-SCNC: 102 MMOL/L — SIGNIFICANT CHANGE UP (ref 98–107)
CO2 SERPL-SCNC: 21 MMOL/L — LOW (ref 22–29)
CREAT SERPL-MCNC: 1.07 MG/DL — SIGNIFICANT CHANGE UP (ref 0.5–1.3)
EOSINOPHIL # BLD AUTO: 0.2 K/UL — SIGNIFICANT CHANGE UP (ref 0–0.5)
EOSINOPHIL NFR BLD AUTO: 2.6 % — SIGNIFICANT CHANGE UP (ref 0–6)
GLUCOSE SERPL-MCNC: 89 MG/DL — SIGNIFICANT CHANGE UP (ref 70–115)
HCT VFR BLD CALC: 33.1 % — LOW (ref 37–47)
HGB BLD-MCNC: 10.6 G/DL — LOW (ref 12–16)
LYMPHOCYTES # BLD AUTO: 1.1 K/UL — SIGNIFICANT CHANGE UP (ref 1–4.8)
LYMPHOCYTES # BLD AUTO: 17.2 % — LOW (ref 20–55)
MCHC RBC-ENTMCNC: 27.9 PG — SIGNIFICANT CHANGE UP (ref 27–31)
MCHC RBC-ENTMCNC: 32 G/DL — SIGNIFICANT CHANGE UP (ref 32–36)
MCV RBC AUTO: 87.1 FL — SIGNIFICANT CHANGE UP (ref 81–99)
MONOCYTES # BLD AUTO: 0.2 K/UL — SIGNIFICANT CHANGE UP (ref 0–0.8)
MONOCYTES NFR BLD AUTO: 2.7 % — LOW (ref 3–10)
NEUTROPHILS # BLD AUTO: 5.1 K/UL — SIGNIFICANT CHANGE UP (ref 1.8–8)
NEUTROPHILS NFR BLD AUTO: 77.2 % — HIGH (ref 37–73)
NIGHT BLUE STAIN TISS: SIGNIFICANT CHANGE UP
PLATELET # BLD AUTO: 274 K/UL — SIGNIFICANT CHANGE UP (ref 150–400)
POTASSIUM SERPL-MCNC: 4.5 MMOL/L — SIGNIFICANT CHANGE UP (ref 3.5–5.3)
POTASSIUM SERPL-SCNC: 4.5 MMOL/L — SIGNIFICANT CHANGE UP (ref 3.5–5.3)
RBC # BLD: 3.8 M/UL — LOW (ref 4.4–5.2)
RBC # FLD: 14.9 % — SIGNIFICANT CHANGE UP (ref 11–15.6)
SODIUM SERPL-SCNC: 135 MMOL/L — SIGNIFICANT CHANGE UP (ref 135–145)
SPECIMEN SOURCE: SIGNIFICANT CHANGE UP
WBC # BLD: 6.6 K/UL — SIGNIFICANT CHANGE UP (ref 4.8–10.8)
WBC # FLD AUTO: 6.6 K/UL — SIGNIFICANT CHANGE UP (ref 4.8–10.8)

## 2019-05-23 PROCEDURE — 99233 SBSQ HOSP IP/OBS HIGH 50: CPT

## 2019-05-23 PROCEDURE — 74018 RADEX ABDOMEN 1 VIEW: CPT | Mod: 26

## 2019-05-23 PROCEDURE — 99358 PROLONG SERVICE W/O CONTACT: CPT

## 2019-05-23 RX ORDER — CEFTRIAXONE 500 MG/1
1 INJECTION, POWDER, FOR SOLUTION INTRAMUSCULAR; INTRAVENOUS EVERY 24 HOURS
Refills: 0 | Status: DISCONTINUED | OUTPATIENT
Start: 2019-05-23 | End: 2019-05-25

## 2019-05-23 RX ORDER — LACTULOSE 10 G/15ML
10 SOLUTION ORAL EVERY 8 HOURS
Refills: 0 | Status: DISCONTINUED | OUTPATIENT
Start: 2019-05-23 | End: 2019-05-29

## 2019-05-23 RX ADMIN — Medication 1 APPLICATION(S): at 13:37

## 2019-05-23 RX ADMIN — Medication 250 MILLIGRAM(S): at 06:21

## 2019-05-23 RX ADMIN — ENOXAPARIN SODIUM 40 MILLIGRAM(S): 100 INJECTION SUBCUTANEOUS at 16:55

## 2019-05-23 RX ADMIN — POLYETHYLENE GLYCOL 3350 17 GRAM(S): 17 POWDER, FOR SOLUTION ORAL at 13:36

## 2019-05-23 RX ADMIN — Medication 1 TABLET(S): at 13:37

## 2019-05-23 RX ADMIN — Medication 250 MILLIGRAM(S): at 17:42

## 2019-05-23 RX ADMIN — LACTULOSE 10 GRAM(S): 10 SOLUTION ORAL at 21:29

## 2019-05-23 RX ADMIN — SODIUM CHLORIDE 3 MILLILITER(S): 9 INJECTION INTRAMUSCULAR; INTRAVENOUS; SUBCUTANEOUS at 14:54

## 2019-05-23 RX ADMIN — SODIUM CHLORIDE 3 MILLILITER(S): 9 INJECTION INTRAMUSCULAR; INTRAVENOUS; SUBCUTANEOUS at 06:24

## 2019-05-23 RX ADMIN — MINOCYCLINE HYDROCHLORIDE 100 MILLIGRAM(S): 45 TABLET, EXTENDED RELEASE ORAL at 06:21

## 2019-05-23 RX ADMIN — Medication 100 MILLIGRAM(S): at 06:21

## 2019-05-23 RX ADMIN — Medication 100 MILLIGRAM(S): at 17:42

## 2019-05-23 RX ADMIN — Medication 375 MILLIGRAM(S): at 06:21

## 2019-05-23 RX ADMIN — Medication 650 MILLIGRAM(S): at 10:57

## 2019-05-23 RX ADMIN — SODIUM CHLORIDE 3 MILLILITER(S): 9 INJECTION INTRAMUSCULAR; INTRAVENOUS; SUBCUTANEOUS at 21:24

## 2019-05-23 RX ADMIN — CEFTRIAXONE 100 GRAM(S): 500 INJECTION, POWDER, FOR SOLUTION INTRAMUSCULAR; INTRAVENOUS at 16:55

## 2019-05-23 RX ADMIN — LACTULOSE 10 GRAM(S): 10 SOLUTION ORAL at 06:21

## 2019-05-23 RX ADMIN — MINOCYCLINE HYDROCHLORIDE 100 MILLIGRAM(S): 45 TABLET, EXTENDED RELEASE ORAL at 17:42

## 2019-05-23 RX ADMIN — Medication 650 MILLIGRAM(S): at 10:08

## 2019-05-23 RX ADMIN — Medication 25 MILLIGRAM(S): at 06:21

## 2019-05-23 RX ADMIN — ONDANSETRON 4 MILLIGRAM(S): 8 TABLET, FILM COATED ORAL at 21:29

## 2019-05-23 RX ADMIN — LACTULOSE 10 GRAM(S): 10 SOLUTION ORAL at 16:58

## 2019-05-23 NOTE — PROGRESS NOTE ADULT - ASSESSMENT
77 y/o female with RLE venous stasis ulcer from varicose veins with superimposed cellulitis, Hypercalcemia, Hyperproteinemia, mild anemia    #sirs. resolved  new onset 5/22  ? uti, pt complaining of urinary freq, otherwise no s/s of of infection  worsening crp + new onset LT shift  fu blood cx x2 5/22  ua/ uc  sp rocephin x1 5/22, cont abx for now, stop if cx w/u neg  trend fever curve    #Right lower ext skin ulcer. stable  recurrent lesion, hx of treatment @ [facility]. present on + off x3yrs. 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 @ this time, venous ulcer favored as likely dx but cannot definitively discern  Plan:  wound care Vascular surgery recommend collagenase, compression. local wound care.    ID consult appreciated, sp ancef for cellulitis, stopped 5/21  derm eval appreciated, sp wedge bx 5/22 bedside by acs as recommended, well tolerated uncomplicated  derm to fu results as outpt, Dr Ortiz  empirically on minocycline for pyoderma, 200mg followed by 100mg bid x5d, last day 5/26, can extend to 10d course if needed  neg blood cx x2 5/20  Vascular surgery consult appreciated, CATE- showing no arterial compromise  prn pain control    #Cellulitis of right lower extremity. not truly present per ID     #Hypercalcemia.  resolved   Plan: ? underlying MM. pending protein electrophoresis, if not done can be followed and performed as outpt w/ pmd, no acute complications    #Stasis dermatitis: stable, chronic. sec to mild PVD on RLE  Plan: supportive care  elevate RLE    #hypoglycemia. asymptomatic, resolved  a1c 5.4  serial bmp  start fingersticks if recurrent    #constipation. mildly improved but still present  bowel regimen to cont, fu BM    #dvt ppx. lovenox    #dispo. PT eval appreciated, [facility] vs. home w/ PT, will fu patient preference. she would like family involved in decision making. plan for dc when pain controlled + after bx, likely in 24-48hrs    #outpt fu. pmd - has none, needs referral on dc, vasc sx Dr Adames, derm Dr Ortiz

## 2019-05-23 NOTE — PROGRESS NOTE ADULT - SUBJECTIVE AND OBJECTIVE BOX
CC: leg ulcer    Patient seen and examined at the bedside. No acute overnight events. sp bedside wedge bx yesterday. Complaining of continued constipation but LE pain improved this AM. Denies fever/chills, headache, lightheadedness, dizziness, chest pain, palpitations, shortness of breath, cough, abd pain, nausea/vomiting/diarrhea      =========================================================================================    PHYSICAL EXAM.    GEN - appears younger than age. thin. pleasant. no distress.   HEENT - NCAT, EOMI, KUNAL  RESP - CTA BL, no wheeze/stridor/rhonchi/crackles. not on supplemental O2. able to speak in full sentences without distress.   CARDIO - NS1S2, RRR. No murmurs/rubs/gallops.  ABD - Soft/Non tender/Non distended. hypoactive BS x4 quadrants. no guarding/rebound tenderness.  Ext - RLE edema. RLE wrapped in ace  MSK - BL 5/5 strength on upper and lower extremities.   Neuro - AAOx3. cn 2-12 grossly intact  Psych - normal affect      VITAL SIGNS.    Vital Signs Last 24 Hrs  T(C): 36.9 (23 May 2019 08:38), Max: 38.4 (22 May 2019 15:25)  T(F): 98.4 (23 May 2019 08:38), Max: 101.1 (22 May 2019 15:25)  HR: 93 (23 May 2019 08:38) (84 - 97)  BP: 118/54 (23 May 2019 08:38) (110/70 - 118/54)  BP(mean): --  RR: 20 (23 May 2019 08:38) (18 - 20)  SpO2: 94% (23 May 2019 08:38) (94% - 98%)        =================================================    LABS.                          10.6   6.6   )-----------( 274      ( 23 May 2019 08:38 )             33.1     05-23    135  |  102  |  24.0<H>  ----------------------------<  89  4.5   |  21.0<L>  |  1.07    Ca    9.7      23 May 2019 08:34    TPro  8.3  /  Alb  3.4  /  TBili  0.4  /  DBili  x   /  AST  30  /  ALT  13  /  AlkPhos  73  05-22    LIVER FUNCTIONS - ( 22 May 2019 17:34 )  Alb: 3.4 g/dL / Pro: 8.3 g/dL / ALK PHOS: 73 U/L / ALT: 13 U/L / AST: 30 U/L / GGT: x             I&O's Summary    22 May 2019 07:01  -  23 May 2019 07:00  --------------------------------------------------------  IN: 2050 mL / OUT: 500 mL / NET: 1550 mL        Culture - Tissue with Gram Stain (collected 22 May 2019 16:31)  Source: .Tissue  Gram Stain (22 May 2019 23:54):    Few WBC's    No organisms seen        ================================================    IMAGING.      ================================================    HOME MEDS.    Home Medications:      ================================================    HOSPITAL MEDS.    MEDICATIONS  (STANDING):  collagenase Ointment 1 Application(s) Topical daily  docusate sodium 100 milliGRAM(s) Oral two times a day  enoxaparin Injectable 40 milliGRAM(s) SubCutaneous every 24 hours  minocycline 100 milliGRAM(s) Oral two times a day  multivitamin 1 Tablet(s) Oral daily  polyethylene glycol 3350 17 Gram(s) Oral daily  saccharomyces boulardii 250 milliGRAM(s) Oral two times a day  senna 2 Tablet(s) Oral at bedtime  sodium chloride 0.9% lock flush 3 milliLiter(s) IV Push every 8 hours    MEDICATIONS  (PRN):  acetaminophen   Tablet .. 650 milliGRAM(s) Oral every 6 hours PRN Temp greater or equal to 38C (100.4F), Mild Pain (1 - 3)  diphenhydrAMINE 25 milliGRAM(s) Oral every 6 hours PRN Rash and/or Itching  lactulose Syrup 10 Gram(s) Oral every 12 hours PRN constipation  naproxen 375 milliGRAM(s) Oral every 8 hours PRN refractory pain  ondansetron Injectable 4 milliGRAM(s) IV Push every 6 hours PRN Nausea  oxyCODONE    5 mG/acetaminophen 325 mG 1 Tablet(s) Oral every 4 hours PRN Moderate Pain (4 - 6)  oxyCODONE    5 mG/acetaminophen 325 mG 2 Tablet(s) Oral every 4 hours PRN Severe Pain (7 - 10)

## 2019-05-23 NOTE — CHART NOTE - NSCHARTNOTEFT_GEN_A_CORE
POST OPERATIVE NOTE    Patient is a 79 y/o female with past medical history chronic RLE ulcer that has been there for past 3+ years, p/w nonhealing and worsening RLE ulcer.  Consulted with Derm recommendation for wedge biopsy.  Pt underwent bedside wedge biopsy.  Pt tolerated procedure well.    Pt now seen at bedside resting comfortably.  Wound was examined at bedside.  C/O of pain and worse when bandages left there do not have petroleum or xeroform gauze.  No fevers/chills/sob/cp/n/v.    T(C): 37.5 (05-23-19 @ 00:14), Max: 38.4 (05-22-19 @ 15:25)  HR: 84 (05-23-19 @ 00:14) (73 - 97)  BP: 110/70 (05-23-19 @ 00:14) (102/58 - 115/62)  RR: 18 (05-23-19 @ 00:14) (18 - 18)  SpO2: 98% (05-23-19 @ 00:14) (95% - 98%)      05-22-19 @ 07:01  -  05-23-19 @ 02:10  --------------------------------------------------------  IN: 2050 mL / OUT: 500 mL / NET: 1550 mL        NAD, AOx3, resting comfortably in bed  No respiratory distress  Abdomen soft, nontender, nondistended. No guarding or rebound.  right lower shin anterior aspect 4x6 cm ulcer with irregular borders a granulating, friable base. Varicosities are appreciated. Skin changes consistent with stasis dermatitis. The ulcer is very tender, painful when dressing was removed.  No peripheral edema. Normal ROM.    Imaging:     Assessment: 77yo F s/p bedside skin wedge resection sent for biopsy    Plan:  -Continue pain control  -Advance to regular diet  -Encourage OOB, ambulation, IS  -continue aggressive bowel regimen  -daily dressing change with xeroform, gauze and acewrap  -ACS to sign off.  Reconsult if pt status changes.  Thank you.

## 2019-05-23 NOTE — CHART NOTE - NSCHARTNOTEFT_GEN_A_CORE
xray abd reviewed, no signs sbo, pt w/ moderate amount of stool however. change lactulose from prn to standing q8h, cont rest of bowel regimen, fu BM    also, called patient lukas Coronado ([phone]). In depth phone conversation regarding patient's current clinical condition, diagnosis, therapy, further options for care, and plan moving forward held today due to patient request . All questions answered at length to the satisfaction of all participants. discussed differentials being pyoderma vs. venous stasis ulcer, recent bx, expectation and necessary outpt fu for continued care. discussed options regarding dispo as being home vs. rehab. at this time she believes rehab is a better option for help in ambulating + continued wound care. will follow with patient regarding final wishes.     Conversation start time: 1620  Conversation end time: 1633  Total time of conversation: 33min

## 2019-05-24 LAB
APPEARANCE UR: CLEAR — SIGNIFICANT CHANGE UP
BACTERIA # UR AUTO: ABNORMAL
BASOPHILS # BLD AUTO: 0 K/UL — SIGNIFICANT CHANGE UP (ref 0–0.2)
BASOPHILS NFR BLD AUTO: 0.2 % — SIGNIFICANT CHANGE UP (ref 0–2)
BILIRUB UR-MCNC: NEGATIVE — SIGNIFICANT CHANGE UP
COLOR SPEC: YELLOW — SIGNIFICANT CHANGE UP
CRP SERPL-MCNC: 6.85 MG/DL — HIGH (ref 0–0.4)
DIFF PNL FLD: ABNORMAL
EOSINOPHIL # BLD AUTO: 0.2 K/UL — SIGNIFICANT CHANGE UP (ref 0–0.5)
EOSINOPHIL NFR BLD AUTO: 4.1 % — SIGNIFICANT CHANGE UP (ref 0–6)
EPI CELLS # UR: SIGNIFICANT CHANGE UP
GLUCOSE UR QL: NEGATIVE MG/DL — SIGNIFICANT CHANGE UP
HCT VFR BLD CALC: 31.7 % — LOW (ref 37–47)
HGB BLD-MCNC: 9.8 G/DL — LOW (ref 12–16)
KETONES UR-MCNC: NEGATIVE — SIGNIFICANT CHANGE UP
LEUKOCYTE ESTERASE UR-ACNC: ABNORMAL
LYMPHOCYTES # BLD AUTO: 1.2 K/UL — SIGNIFICANT CHANGE UP (ref 1–4.8)
LYMPHOCYTES # BLD AUTO: 30 % — SIGNIFICANT CHANGE UP (ref 20–55)
MCHC RBC-ENTMCNC: 27.4 PG — SIGNIFICANT CHANGE UP (ref 27–31)
MCHC RBC-ENTMCNC: 30.9 G/DL — LOW (ref 32–36)
MCV RBC AUTO: 88.5 FL — SIGNIFICANT CHANGE UP (ref 81–99)
MONOCYTES # BLD AUTO: 0.3 K/UL — SIGNIFICANT CHANGE UP (ref 0–0.8)
MONOCYTES NFR BLD AUTO: 6.8 % — SIGNIFICANT CHANGE UP (ref 3–10)
NEUTROPHILS # BLD AUTO: 2.4 K/UL — SIGNIFICANT CHANGE UP (ref 1.8–8)
NEUTROPHILS NFR BLD AUTO: 58.7 % — SIGNIFICANT CHANGE UP (ref 37–73)
NITRITE UR-MCNC: NEGATIVE — SIGNIFICANT CHANGE UP
PH UR: 6 — SIGNIFICANT CHANGE UP (ref 5–8)
PLATELET # BLD AUTO: 256 K/UL — SIGNIFICANT CHANGE UP (ref 150–400)
PROT UR-MCNC: 15 MG/DL
RBC # BLD: 3.58 M/UL — LOW (ref 4.4–5.2)
RBC # FLD: 14.9 % — SIGNIFICANT CHANGE UP (ref 11–15.6)
RBC CASTS # UR COMP ASSIST: SIGNIFICANT CHANGE UP /HPF (ref 0–4)
SP GR SPEC: 1.01 — SIGNIFICANT CHANGE UP (ref 1.01–1.02)
UROBILINOGEN FLD QL: NEGATIVE MG/DL — SIGNIFICANT CHANGE UP
WBC # BLD: 4.1 K/UL — LOW (ref 4.8–10.8)
WBC # FLD AUTO: 4.1 K/UL — LOW (ref 4.8–10.8)
WBC UR QL: SIGNIFICANT CHANGE UP

## 2019-05-24 PROCEDURE — 99232 SBSQ HOSP IP/OBS MODERATE 35: CPT

## 2019-05-24 RX ADMIN — ENOXAPARIN SODIUM 40 MILLIGRAM(S): 100 INJECTION SUBCUTANEOUS at 17:19

## 2019-05-24 RX ADMIN — MINOCYCLINE HYDROCHLORIDE 100 MILLIGRAM(S): 45 TABLET, EXTENDED RELEASE ORAL at 17:20

## 2019-05-24 RX ADMIN — Medication 375 MILLIGRAM(S): at 18:00

## 2019-05-24 RX ADMIN — Medication 25 MILLIGRAM(S): at 21:39

## 2019-05-24 RX ADMIN — SODIUM CHLORIDE 3 MILLILITER(S): 9 INJECTION INTRAMUSCULAR; INTRAVENOUS; SUBCUTANEOUS at 21:38

## 2019-05-24 RX ADMIN — Medication 650 MILLIGRAM(S): at 21:38

## 2019-05-24 RX ADMIN — Medication 375 MILLIGRAM(S): at 17:20

## 2019-05-24 RX ADMIN — LACTULOSE 10 GRAM(S): 10 SOLUTION ORAL at 05:14

## 2019-05-24 RX ADMIN — Medication 650 MILLIGRAM(S): at 13:00

## 2019-05-24 RX ADMIN — Medication 1 APPLICATION(S): at 17:19

## 2019-05-24 RX ADMIN — Medication 1 TABLET(S): at 12:01

## 2019-05-24 RX ADMIN — Medication 650 MILLIGRAM(S): at 12:02

## 2019-05-24 RX ADMIN — Medication 250 MILLIGRAM(S): at 05:14

## 2019-05-24 RX ADMIN — CEFTRIAXONE 100 GRAM(S): 500 INJECTION, POWDER, FOR SOLUTION INTRAMUSCULAR; INTRAVENOUS at 17:18

## 2019-05-24 RX ADMIN — Medication 100 MILLIGRAM(S): at 05:14

## 2019-05-24 RX ADMIN — Medication 250 MILLIGRAM(S): at 17:20

## 2019-05-24 RX ADMIN — MINOCYCLINE HYDROCHLORIDE 100 MILLIGRAM(S): 45 TABLET, EXTENDED RELEASE ORAL at 05:14

## 2019-05-24 RX ADMIN — SODIUM CHLORIDE 3 MILLILITER(S): 9 INJECTION INTRAMUSCULAR; INTRAVENOUS; SUBCUTANEOUS at 12:00

## 2019-05-24 RX ADMIN — SODIUM CHLORIDE 3 MILLILITER(S): 9 INJECTION INTRAMUSCULAR; INTRAVENOUS; SUBCUTANEOUS at 05:17

## 2019-05-24 NOTE — PROGRESS NOTE ADULT - SUBJECTIVE AND OBJECTIVE BOX
CC: leg ulcer    Patient seen and examined at the bedside. No acute overnight events. no events yesterday. Complaining of nothing this AM. Denies fever/chills, headache, lightheadedness, dizziness, chest pain, palpitations, shortness of breath, cough, abd pain, nausea/vomiting/diarrhea      =========================================================================================    PHYSICAL EXAM.    GEN - appears younger than age. thin. pleasant. no distress.   HEENT - NCAT, EOMI, KUNAL  RESP - CTA BL, no wheeze/stridor/rhonchi/crackles. not on supplemental O2. able to speak in full sentences without distress.   CARDIO - NS1S2, RRR. No murmurs/rubs/gallops.  ABD - Soft/Non tender/Non distended. normoactive BS x4 quadrants. no guarding/rebound tenderness.  Ext - RLE edema. RLE wrapped in ace  MSK - BL 5/5 strength on upper and lower extremities.   Neuro - AAOx3. cn 2-12 grossly intact  Psych - normal affect    VITALS          =================================================    LABS.                          9.8    4.1   )-----------( 256      ( 24 May 2019 08:02 )             31.7     05-23    135  |  102  |  24.0<H>  ----------------------------<  89  4.5   |  21.0<L>  |  1.07    Ca    9.7      23 May 2019 08:34          I&O's Summary    23 May 2019 07:01  -  24 May 2019 07:00  --------------------------------------------------------  IN: 50 mL / OUT: 275 mL / NET: -225 mL        Culture - Acid Fast - Tissue w/Smear (collected 23 May 2019 18:28)  Source: .Tissue specimen Source:  Other, right LE wound    Culture - Blood (collected 22 May 2019 17:35)  Source: .Blood  Preliminary Report (24 May 2019 19:00):    No growth at 48 hours    Culture - Blood (collected 22 May 2019 17:34)  Source: .Blood  Preliminary Report (24 May 2019 19:00):    No growth at 48 hours    Culture - Tissue with Gram Stain (collected 22 May 2019 16:31)  Source: .Tissue  Gram Stain (22 May 2019 23:54):    Few WBC's    No organisms seen  Preliminary Report (23 May 2019 15:50):    Few Gram Negative Rods Identification and susceptibility to follow.    Culture in progress        ================================================    IMAGING.      ================================================    HOME MEDS.    Home Medications:      ================================================    HOSPITAL MEDS.    MEDICATIONS  (STANDING):  cefTRIAXone   IVPB 1 Gram(s) IV Intermittent every 24 hours  collagenase Ointment 1 Application(s) Topical daily  docusate sodium 100 milliGRAM(s) Oral two times a day  enoxaparin Injectable 40 milliGRAM(s) SubCutaneous every 24 hours  lactulose Syrup 10 Gram(s) Oral every 8 hours  minocycline 100 milliGRAM(s) Oral two times a day  multivitamin 1 Tablet(s) Oral daily  polyethylene glycol 3350 17 Gram(s) Oral daily  saccharomyces boulardii 250 milliGRAM(s) Oral two times a day  senna 2 Tablet(s) Oral at bedtime  sodium chloride 0.9% lock flush 3 milliLiter(s) IV Push every 8 hours    MEDICATIONS  (PRN):  acetaminophen   Tablet .. 650 milliGRAM(s) Oral every 6 hours PRN Temp greater or equal to 38C (100.4F), Mild Pain (1 - 3)  diphenhydrAMINE 25 milliGRAM(s) Oral every 6 hours PRN Rash and/or Itching  naproxen 375 milliGRAM(s) Oral every 8 hours PRN Mild Pain (1 - 3). alternate with acetaminophen  ondansetron Injectable 4 milliGRAM(s) IV Push every 6 hours PRN Nausea  oxyCODONE    5 mG/acetaminophen 325 mG 1 Tablet(s) Oral every 4 hours PRN Moderate Pain (4 - 6)  oxyCODONE    5 mG/acetaminophen 325 mG 2 Tablet(s) Oral every 4 hours PRN Severe Pain (7 - 10)

## 2019-05-25 LAB
-  AMIKACIN: SIGNIFICANT CHANGE UP
-  AMPICILLIN/SULBACTAM: SIGNIFICANT CHANGE UP
-  AMPICILLIN: SIGNIFICANT CHANGE UP
-  AZTREONAM: SIGNIFICANT CHANGE UP
-  CEFAZOLIN: SIGNIFICANT CHANGE UP
-  CEFEPIME: SIGNIFICANT CHANGE UP
-  CEFOXITIN: SIGNIFICANT CHANGE UP
-  CEFTRIAXONE: SIGNIFICANT CHANGE UP
-  CIPROFLOXACIN: SIGNIFICANT CHANGE UP
-  ERTAPENEM: SIGNIFICANT CHANGE UP
-  GENTAMICIN: SIGNIFICANT CHANGE UP
-  IMIPENEM: SIGNIFICANT CHANGE UP
-  LEVOFLOXACIN: SIGNIFICANT CHANGE UP
-  MEROPENEM: SIGNIFICANT CHANGE UP
-  PIPERACILLIN/TAZOBACTAM: SIGNIFICANT CHANGE UP
-  TOBRAMYCIN: SIGNIFICANT CHANGE UP
-  TRIMETHOPRIM/SULFAMETHOXAZOLE: SIGNIFICANT CHANGE UP
ANION GAP SERPL CALC-SCNC: 11 MMOL/L — SIGNIFICANT CHANGE UP (ref 5–17)
BASOPHILS # BLD AUTO: 0 K/UL — SIGNIFICANT CHANGE UP (ref 0–0.2)
BASOPHILS NFR BLD AUTO: 0.3 % — SIGNIFICANT CHANGE UP (ref 0–2)
BUN SERPL-MCNC: 28 MG/DL — HIGH (ref 8–20)
CALCIUM SERPL-MCNC: 10 MG/DL — SIGNIFICANT CHANGE UP (ref 8.6–10.2)
CHLORIDE SERPL-SCNC: 100 MMOL/L — SIGNIFICANT CHANGE UP (ref 98–107)
CO2 SERPL-SCNC: 26 MMOL/L — SIGNIFICANT CHANGE UP (ref 22–29)
CREAT SERPL-MCNC: 1.12 MG/DL — SIGNIFICANT CHANGE UP (ref 0.5–1.3)
CULTURE RESULTS: NO GROWTH — SIGNIFICANT CHANGE UP
CULTURE RESULTS: SIGNIFICANT CHANGE UP
CULTURE RESULTS: SIGNIFICANT CHANGE UP
EOSINOPHIL # BLD AUTO: 0.2 K/UL — SIGNIFICANT CHANGE UP (ref 0–0.5)
EOSINOPHIL NFR BLD AUTO: 6.6 % — HIGH (ref 0–6)
GLUCOSE SERPL-MCNC: 105 MG/DL — SIGNIFICANT CHANGE UP (ref 70–115)
HCT VFR BLD CALC: 32.2 % — LOW (ref 37–47)
HGB BLD-MCNC: 10 G/DL — LOW (ref 12–16)
LYMPHOCYTES # BLD AUTO: 1.3 K/UL — SIGNIFICANT CHANGE UP (ref 1–4.8)
LYMPHOCYTES # BLD AUTO: 39.7 % — SIGNIFICANT CHANGE UP (ref 20–55)
MCHC RBC-ENTMCNC: 27.5 PG — SIGNIFICANT CHANGE UP (ref 27–31)
MCHC RBC-ENTMCNC: 31.1 G/DL — LOW (ref 32–36)
MCV RBC AUTO: 88.5 FL — SIGNIFICANT CHANGE UP (ref 81–99)
METHOD TYPE: SIGNIFICANT CHANGE UP
MONOCYTES # BLD AUTO: 0.3 K/UL — SIGNIFICANT CHANGE UP (ref 0–0.8)
MONOCYTES NFR BLD AUTO: 9 % — SIGNIFICANT CHANGE UP (ref 3–10)
NEUTROPHILS # BLD AUTO: 1.5 K/UL — LOW (ref 1.8–8)
NEUTROPHILS NFR BLD AUTO: 44.1 % — SIGNIFICANT CHANGE UP (ref 37–73)
PLATELET # BLD AUTO: 280 K/UL — SIGNIFICANT CHANGE UP (ref 150–400)
POTASSIUM SERPL-MCNC: 4.3 MMOL/L — SIGNIFICANT CHANGE UP (ref 3.5–5.3)
POTASSIUM SERPL-SCNC: 4.3 MMOL/L — SIGNIFICANT CHANGE UP (ref 3.5–5.3)
PROCALCITONIN SERPL-MCNC: 0.35 NG/ML — HIGH (ref 0.02–0.1)
RBC # BLD: 3.64 M/UL — LOW (ref 4.4–5.2)
RBC # FLD: 15.1 % — SIGNIFICANT CHANGE UP (ref 11–15.6)
SODIUM SERPL-SCNC: 137 MMOL/L — SIGNIFICANT CHANGE UP (ref 135–145)
SPECIMEN SOURCE: SIGNIFICANT CHANGE UP
SURGICAL PATHOLOGY STUDY: SIGNIFICANT CHANGE UP
WBC # BLD: 3.4 K/UL — LOW (ref 4.8–10.8)
WBC # FLD AUTO: 3.4 K/UL — LOW (ref 4.8–10.8)

## 2019-05-25 PROCEDURE — 99232 SBSQ HOSP IP/OBS MODERATE 35: CPT

## 2019-05-25 RX ORDER — DOCUSATE SODIUM 100 MG
1 CAPSULE ORAL
Qty: 0 | Refills: 0 | DISCHARGE
Start: 2019-05-25

## 2019-05-25 RX ORDER — ACETAMINOPHEN 500 MG
2 TABLET ORAL
Qty: 0 | Refills: 0 | DISCHARGE
Start: 2019-05-25

## 2019-05-25 RX ORDER — COLLAGENASE CLOSTRIDIUM HIST. 250 UNIT/G
1 OINTMENT (GRAM) TOPICAL
Qty: 0 | Refills: 0 | DISCHARGE
Start: 2019-05-25

## 2019-05-25 RX ORDER — POLYETHYLENE GLYCOL 3350 17 G/17G
17 POWDER, FOR SOLUTION ORAL
Qty: 0 | Refills: 0 | DISCHARGE
Start: 2019-05-25

## 2019-05-25 RX ORDER — LACTULOSE 10 G/15ML
15 SOLUTION ORAL
Qty: 0 | Refills: 0 | DISCHARGE
Start: 2019-05-25

## 2019-05-25 RX ORDER — SENNA PLUS 8.6 MG/1
2 TABLET ORAL
Qty: 0 | Refills: 0 | DISCHARGE
Start: 2019-05-25

## 2019-05-25 RX ADMIN — Medication 1 TABLET(S): at 12:55

## 2019-05-25 RX ADMIN — MINOCYCLINE HYDROCHLORIDE 100 MILLIGRAM(S): 45 TABLET, EXTENDED RELEASE ORAL at 18:22

## 2019-05-25 RX ADMIN — SODIUM CHLORIDE 3 MILLILITER(S): 9 INJECTION INTRAMUSCULAR; INTRAVENOUS; SUBCUTANEOUS at 20:48

## 2019-05-25 RX ADMIN — SENNA PLUS 2 TABLET(S): 8.6 TABLET ORAL at 21:13

## 2019-05-25 RX ADMIN — ENOXAPARIN SODIUM 40 MILLIGRAM(S): 100 INJECTION SUBCUTANEOUS at 18:22

## 2019-05-25 RX ADMIN — LACTULOSE 10 GRAM(S): 10 SOLUTION ORAL at 05:36

## 2019-05-25 RX ADMIN — SODIUM CHLORIDE 3 MILLILITER(S): 9 INJECTION INTRAMUSCULAR; INTRAVENOUS; SUBCUTANEOUS at 12:54

## 2019-05-25 RX ADMIN — MINOCYCLINE HYDROCHLORIDE 100 MILLIGRAM(S): 45 TABLET, EXTENDED RELEASE ORAL at 05:36

## 2019-05-25 RX ADMIN — Medication 1 APPLICATION(S): at 18:16

## 2019-05-25 RX ADMIN — Medication 650 MILLIGRAM(S): at 12:56

## 2019-05-25 RX ADMIN — OXYCODONE AND ACETAMINOPHEN 1 TABLET(S): 5; 325 TABLET ORAL at 18:21

## 2019-05-25 RX ADMIN — Medication 100 MILLIGRAM(S): at 05:36

## 2019-05-25 RX ADMIN — LACTULOSE 10 GRAM(S): 10 SOLUTION ORAL at 21:13

## 2019-05-25 RX ADMIN — SODIUM CHLORIDE 3 MILLILITER(S): 9 INJECTION INTRAMUSCULAR; INTRAVENOUS; SUBCUTANEOUS at 05:35

## 2019-05-25 RX ADMIN — Medication 650 MILLIGRAM(S): at 13:45

## 2019-05-25 NOTE — PROGRESS NOTE ADULT - ASSESSMENT
79 y/o female with RLE venous stasis ulcer from varicose veins with superimposed cellulitis, Hypercalcemia, Hyperproteinemia, mild anemia    #sirs. resolved  new onset 5/22  ? uti, pt was complaining of urinary freq, otherwise no s/s of of infection, only poss source seems to be urine  urinary freq improved on abx  worsening crp, discussed w/ ID briefly, poor marker for infection given pt characteristics  new onset LT shift resolved  neg blood cx x2 5/22  ua/uc neg, both done after abx given  sp rocephin x1 5/22, completed 3d dose    #Right lower ext skin ulcer. stable  recurrent lesion, hx of treatment @ [facility]. present on + off x3yrs. 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 @ this time, venous ulcer favored as likely dx but cannot definitively discern  Plan:  wound care Vascular surgery recommend collagenase, compression. local wound care.    ID consult appreciated, sp ancef for cellulitis, stopped 5/21  derm eval appreciated, sp wedge bx 5/22 bedside by acs as recommended, well tolerated uncomplicated  derm to fu results as outpt, Dr Ortiz  empirically on minocycline for pyoderma, 200mg followed by 100mg bid x5d, last day 5/26, can extend to 10d course if needed  neg blood cx x2 5/20  Vascular surgery consult appreciated, CATE- showing no arterial compromise  prn pain control    #Cellulitis of right lower extremity. not truly present per ID     #Hypercalcemia.  resolved   Plan: ? underlying MM. pending protein electrophoresis, if not done can be followed and performed as outpt w/ pmd, no acute complications    #Stasis dermatitis: stable, chronic. sec to mild PVD on RLE  Plan: supportive care  elevate RLE    #hypoglycemia. asymptomatic, resolved  a1c 5.4  serial bmp  start fingersticks if recurrent    #constipation. resolved  confirmed on xray to have mod impaction  aggressive bowel regimen    #dvt ppx. lovenox    #dispo. PT eval appreciated, [facility] vs. home w/ PT, discussed w/ pt + family, prefer [facility] as pt does not qualify for home care and does not have assist for wound care and other needs, not safe dc until medicaid established, still pending, discussed w/ CM, will follow of 5/28 to see if its gone through    #outpt fu. pmd - has none, needs referral on dc, vasc sx Dr Adames, derm Dr Ortiz

## 2019-05-25 NOTE — PROGRESS NOTE ADULT - SUBJECTIVE AND OBJECTIVE BOX
CC: leg ulcer    Patient seen and examined at the bedside. No acute overnight events. no events yesterday. Complaining of nothing this AM. Denies fever/chills, headache, lightheadedness, dizziness, chest pain, palpitations, shortness of breath, cough, abd pain, nausea/vomiting/diarrhea      =========================================================================================    PHYSICAL EXAM.    GEN - appears younger than age. thin. pleasant. no distress.   HEENT - NCAT, EOMI, KUNAL  RESP - CTA BL, no wheeze/stridor/rhonchi/crackles. not on supplemental O2. able to speak in full sentences without distress.   CARDIO - NS1S2, RRR. No murmurs/rubs/gallops.  ABD - Soft/Non tender/Non distended. normoactive BS x4 quadrants. no guarding/rebound tenderness.  Ext - RLE edema. RLE wrapped in ace  MSK - BL 5/5 strength on upper and lower extremities.   Neuro - AAOx3. cn 2-12 grossly intact  Psych - normal affect      VITAL SIGNS.    Vital Signs Last 24 Hrs  T(C): 36.7 (25 May 2019 16:27), Max: 36.7 (25 May 2019 00:22)  T(F): 98.1 (25 May 2019 16:27), Max: 98.1 (25 May 2019 16:27)  HR: 67 (25 May 2019 16:27) (67 - 70)  BP: 113/64 (25 May 2019 16:27) (113/64 - 121/64)  BP(mean): --  RR: 18 (25 May 2019 16:27) (18 - 18)  SpO2: 98% (25 May 2019 16:27) (97% - 98%)        =================================================    LABS.                          10.0   3.4   )-----------( 280      ( 25 May 2019 09:47 )             32.2     05-25    137  |  100  |  28.0<H>  ----------------------------<  105  4.3   |  26.0  |  1.12    Ca    10.0      25 May 2019 09:47          I&O's Summary      Culture - Urine (collected 24 May 2019 01:35)  Source: .Urine  Final Report (25 May 2019 12:38):    No growth    Culture - Acid Fast - Tissue w/Smear (collected 23 May 2019 18:28)  Source: .Tissue specimen Source:  Other, right LE wound  Preliminary Report (25 May 2019 15:03):    Culture is being performed.        ================================================    IMAGING.      ================================================    HOME MEDS.    Home Medications:  acetaminophen 325 mg oral tablet: 2 tab(s) orally every 6 hours, As needed, Temp greater or equal to 38C (100.4F), Mild Pain (1 - 3) (25 May 2019 05:48)  collagenase 250 units/g topical ointment: 1 application topically once a day (25 May 2019 05:48)  docusate sodium 100 mg oral capsule: 1 cap(s) orally 2 times a day (25 May 2019 05:48)  Endocet 5/325 oral tablet: 2 tab(s) orally every 4 hours, As needed, Severe Pain (7 - 10) (25 May 2019 05:48)  lactulose 10 g/15 mL oral syrup: 15 milliliter(s) orally every 8 hours (25 May 2019 05:48)  Multiple Vitamins oral tablet: 1 tab(s) orally once a day (25 May 2019 05:48)  naproxen 375 mg oral tablet: 1 tab(s) orally every 8 hours, As needed, Mild Pain (1 - 3). alternate with acetaminophen (25 May 2019 05:48)  polyethylene glycol 3350 oral powder for reconstitution: 17 gram(s) orally once a day (25 May 2019 05:48)  senna oral tablet: 2 tab(s) orally once a day (at bedtime) (25 May 2019 05:48)      ================================================    HOSPITAL MEDS.    MEDICATIONS  (STANDING):  collagenase Ointment 1 Application(s) Topical daily  docusate sodium 100 milliGRAM(s) Oral two times a day  enoxaparin Injectable 40 milliGRAM(s) SubCutaneous every 24 hours  lactulose Syrup 10 Gram(s) Oral every 8 hours  minocycline 100 milliGRAM(s) Oral two times a day  multivitamin 1 Tablet(s) Oral daily  polyethylene glycol 3350 17 Gram(s) Oral daily  senna 2 Tablet(s) Oral at bedtime  sodium chloride 0.9% lock flush 3 milliLiter(s) IV Push every 8 hours    MEDICATIONS  (PRN):  acetaminophen   Tablet .. 650 milliGRAM(s) Oral every 6 hours PRN Temp greater or equal to 38C (100.4F), Mild Pain (1 - 3)  diphenhydrAMINE 25 milliGRAM(s) Oral every 6 hours PRN Rash and/or Itching  naproxen 375 milliGRAM(s) Oral every 8 hours PRN Mild Pain (1 - 3). alternate with acetaminophen  ondansetron Injectable 4 milliGRAM(s) IV Push every 6 hours PRN Nausea  oxyCODONE    5 mG/acetaminophen 325 mG 1 Tablet(s) Oral every 4 hours PRN Moderate Pain (4 - 6)  oxyCODONE    5 mG/acetaminophen 325 mG 2 Tablet(s) Oral every 4 hours PRN Severe Pain (7 - 10)

## 2019-05-26 LAB — CRP SERPL-MCNC: 2.83 MG/DL — HIGH (ref 0–0.4)

## 2019-05-26 PROCEDURE — 99232 SBSQ HOSP IP/OBS MODERATE 35: CPT

## 2019-05-26 RX ORDER — GABAPENTIN 400 MG/1
300 CAPSULE ORAL
Refills: 0 | Status: COMPLETED | OUTPATIENT
Start: 2019-05-27 | End: 2019-05-27

## 2019-05-26 RX ORDER — GABAPENTIN 400 MG/1
300 CAPSULE ORAL THREE TIMES A DAY
Refills: 0 | Status: DISCONTINUED | OUTPATIENT
Start: 2019-05-28 | End: 2019-05-29

## 2019-05-26 RX ORDER — GABAPENTIN 400 MG/1
300 CAPSULE ORAL ONCE
Refills: 0 | Status: COMPLETED | OUTPATIENT
Start: 2019-05-26 | End: 2019-05-26

## 2019-05-26 RX ADMIN — OXYCODONE AND ACETAMINOPHEN 2 TABLET(S): 5; 325 TABLET ORAL at 12:33

## 2019-05-26 RX ADMIN — LACTULOSE 10 GRAM(S): 10 SOLUTION ORAL at 06:05

## 2019-05-26 RX ADMIN — OXYCODONE AND ACETAMINOPHEN 2 TABLET(S): 5; 325 TABLET ORAL at 11:15

## 2019-05-26 RX ADMIN — GABAPENTIN 300 MILLIGRAM(S): 400 CAPSULE ORAL at 16:13

## 2019-05-26 RX ADMIN — ENOXAPARIN SODIUM 40 MILLIGRAM(S): 100 INJECTION SUBCUTANEOUS at 16:13

## 2019-05-26 RX ADMIN — Medication 1 APPLICATION(S): at 11:14

## 2019-05-26 RX ADMIN — Medication 1 TABLET(S): at 11:14

## 2019-05-26 RX ADMIN — SODIUM CHLORIDE 3 MILLILITER(S): 9 INJECTION INTRAMUSCULAR; INTRAVENOUS; SUBCUTANEOUS at 22:34

## 2019-05-26 RX ADMIN — MINOCYCLINE HYDROCHLORIDE 100 MILLIGRAM(S): 45 TABLET, EXTENDED RELEASE ORAL at 06:06

## 2019-05-26 RX ADMIN — Medication 100 MILLIGRAM(S): at 06:05

## 2019-05-26 RX ADMIN — SODIUM CHLORIDE 3 MILLILITER(S): 9 INJECTION INTRAMUSCULAR; INTRAVENOUS; SUBCUTANEOUS at 06:01

## 2019-05-26 RX ADMIN — LACTULOSE 10 GRAM(S): 10 SOLUTION ORAL at 22:35

## 2019-05-26 RX ADMIN — MINOCYCLINE HYDROCHLORIDE 100 MILLIGRAM(S): 45 TABLET, EXTENDED RELEASE ORAL at 17:31

## 2019-05-26 RX ADMIN — ONDANSETRON 4 MILLIGRAM(S): 8 TABLET, FILM COATED ORAL at 22:36

## 2019-05-26 RX ADMIN — SODIUM CHLORIDE 3 MILLILITER(S): 9 INJECTION INTRAMUSCULAR; INTRAVENOUS; SUBCUTANEOUS at 14:07

## 2019-05-26 NOTE — PROGRESS NOTE ADULT - SUBJECTIVE AND OBJECTIVE BOX
CC: leg ulcer    Patient seen and examined at the bedside. No acute overnight events. no events yesterday. Complaining of RLE neuropathic pain this AM. Denies fever/chills, headache, lightheadedness, dizziness, chest pain, palpitations, shortness of breath, cough, abd pain, nausea/vomiting/diarrhea      =========================================================================================    PHYSICAL EXAM.    GEN - appears younger than age. thin. pleasant. no distress.   HEENT - NCAT, EOMI, KUNAL  RESP - CTA BL, no wheeze/stridor/rhonchi/crackles. not on supplemental O2. able to speak in full sentences without distress.   CARDIO - NS1S2, RRR. No murmurs/rubs/gallops.  ABD - Soft/Non tender/Non distended. normoactive BS x4 quadrants. no guarding/rebound tenderness.  Ext - RLE edema. RLE wrapped in gauze  MSK - BL 5/5 strength on upper and lower extremities.   Neuro - AAOx3. cn 2-12 grossly intact  Psych - normal affect      VITAL SIGNS.    Vital Signs Last 24 Hrs  T(C): 36.7 (25 May 2019 23:33), Max: 36.7 (25 May 2019 16:27)  T(F): 98 (25 May 2019 23:33), Max: 98.1 (25 May 2019 16:27)  HR: 68 (25 May 2019 23:33) (67 - 68)  BP: 110/59 (25 May 2019 23:33) (110/59 - 113/64)  BP(mean): --  RR: 18 (25 May 2019 23:33) (18 - 18)  SpO2: 98% (25 May 2019 16:27) (98% - 98%)        =================================================    LABS.                          10.0   3.4   )-----------( 280      ( 25 May 2019 09:47 )             32.2     05-25    137  |  100  |  28.0<H>  ----------------------------<  105  4.3   |  26.0  |  1.12    Ca    10.0      25 May 2019 09:47          I&O's Summary      Culture - Urine (collected 24 May 2019 01:35)  Source: .Urine  Final Report (25 May 2019 12:38):    No growth    Culture - Acid Fast - Tissue w/Smear (collected 23 May 2019 18:28)  Source: .Tissue specimen Source:  Other, right LE wound  Preliminary Report (25 May 2019 15:03):    Culture is being performed.        ================================================    IMAGING.      ================================================    HOME MEDS.    Home Medications:  acetaminophen 325 mg oral tablet: 2 tab(s) orally every 6 hours, As needed, Temp greater or equal to 38C (100.4F), Mild Pain (1 - 3) (25 May 2019 05:48)  collagenase 250 units/g topical ointment: 1 application topically once a day (25 May 2019 05:48)  docusate sodium 100 mg oral capsule: 1 cap(s) orally 2 times a day (25 May 2019 05:48)  Endocet 5/325 oral tablet: 2 tab(s) orally every 4 hours, As needed, Severe Pain (7 - 10) (25 May 2019 05:48)  lactulose 10 g/15 mL oral syrup: 15 milliliter(s) orally every 8 hours (25 May 2019 05:48)  Multiple Vitamins oral tablet: 1 tab(s) orally once a day (25 May 2019 05:48)  naproxen 375 mg oral tablet: 1 tab(s) orally every 8 hours, As needed, Mild Pain (1 - 3). alternate with acetaminophen (25 May 2019 05:48)  polyethylene glycol 3350 oral powder for reconstitution: 17 gram(s) orally once a day (25 May 2019 05:48)  senna oral tablet: 2 tab(s) orally once a day (at bedtime) (25 May 2019 05:48)      ================================================    HOSPITAL MEDS.    MEDICATIONS  (STANDING):  collagenase Ointment 1 Application(s) Topical daily  docusate sodium 100 milliGRAM(s) Oral two times a day  enoxaparin Injectable 40 milliGRAM(s) SubCutaneous every 24 hours  lactulose Syrup 10 Gram(s) Oral every 8 hours  minocycline 100 milliGRAM(s) Oral two times a day  multivitamin 1 Tablet(s) Oral daily  polyethylene glycol 3350 17 Gram(s) Oral daily  senna 2 Tablet(s) Oral at bedtime  sodium chloride 0.9% lock flush 3 milliLiter(s) IV Push every 8 hours    MEDICATIONS  (PRN):  acetaminophen   Tablet .. 650 milliGRAM(s) Oral every 6 hours PRN Temp greater or equal to 38C (100.4F), Mild Pain (1 - 3)  diphenhydrAMINE 25 milliGRAM(s) Oral every 6 hours PRN Rash and/or Itching  naproxen 375 milliGRAM(s) Oral every 8 hours PRN Mild Pain (1 - 3). alternate with acetaminophen  ondansetron Injectable 4 milliGRAM(s) IV Push every 6 hours PRN Nausea  oxyCODONE    5 mG/acetaminophen 325 mG 2 Tablet(s) Oral every 4 hours PRN Severe Pain (7 - 10)

## 2019-05-26 NOTE — PROGRESS NOTE ADULT - ASSESSMENT
77 y/o female with RLE venous stasis ulcer from varicose veins with superimposed cellulitis, Hypercalcemia, Hyperproteinemia, mild anemia    #neuropathy, RLE.   start gabapentin, titrate up to 300mg tid  cont titrate to pain control    #sirs. resolved  new onset 5/22  ? uti, pt was complaining of urinary freq, otherwise no s/s of of infection, only poss source seems to be urine  urinary freq improved on abx  crp now improved, discussed w/ ID briefly, poor marker for infection given pt characteristics, procal unremarkable for systemic infection  neg blood cx x2 5/22  ua/uc neg, both done after abx given  sp rocephin x3d course    #Right lower ext skin ulcer. stable  recurrent lesion, hx of treatment @ [facility]. present on + off x3yrs. 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 @ this time, venous ulcer favored as likely dx but cannot definitively discern  Plan:  wound care Vascular surgery recommend collagenase, compression. local wound care.    ID consult appreciated, sp ancef for cellulitis, stopped 5/21  derm eval appreciated, sp wedge bx 5/22 bedside by acs as recommended, well tolerated uncomplicated, cx pending 5/23  derm to fu results as outpt, Dr Angel mills empiric 5d course minocycline for pyoderma  Vascular surgery consult appreciated, CATE- showing no arterial compromise  prn pain control  outpt fu w/ vasc + derm    #Cellulitis of right lower extremity. not truly present per ID     #Hypercalcemia.  resolved     #Stasis dermatitis: stable, chronic. sec to mild PVD on RLE  Plan: supportive care  elevate RLE    #hypoglycemia. asymptomatic, resolved  a1c 5.4  encourage steady oral intake    #constipation. resolved  confirmed on xray to have mod impaction  aggressive bowel regimen    #dvt ppx. lovenox    #dispo. PT eval appreciated, [facility] vs. home w/ PT, discussed w/ pt + family, prefer [facility] but pt w/o active insurance to cover that or home care, as she does not have assist for wound care and other needs, not safe dc until medicaid established, still pending, discussed w/ CM, will follow of 5/28 to see if its gone through after holiday    #outpt fu. pmd - has none, needs referral on dc, vasc sx Dr Adames, derm Dr Ortiz

## 2019-05-27 LAB
CULTURE RESULTS: SIGNIFICANT CHANGE UP
ORGANISM # SPEC MICROSCOPIC CNT: SIGNIFICANT CHANGE UP
ORGANISM # SPEC MICROSCOPIC CNT: SIGNIFICANT CHANGE UP
SPECIMEN SOURCE: SIGNIFICANT CHANGE UP

## 2019-05-27 PROCEDURE — 99232 SBSQ HOSP IP/OBS MODERATE 35: CPT

## 2019-05-27 RX ADMIN — Medication 1 TABLET(S): at 11:24

## 2019-05-27 RX ADMIN — LACTULOSE 10 GRAM(S): 10 SOLUTION ORAL at 21:19

## 2019-05-27 RX ADMIN — SODIUM CHLORIDE 3 MILLILITER(S): 9 INJECTION INTRAMUSCULAR; INTRAVENOUS; SUBCUTANEOUS at 21:23

## 2019-05-27 RX ADMIN — ENOXAPARIN SODIUM 40 MILLIGRAM(S): 100 INJECTION SUBCUTANEOUS at 16:03

## 2019-05-27 RX ADMIN — GABAPENTIN 300 MILLIGRAM(S): 400 CAPSULE ORAL at 18:09

## 2019-05-27 RX ADMIN — Medication 650 MILLIGRAM(S): at 21:19

## 2019-05-27 RX ADMIN — Medication 1 APPLICATION(S): at 11:24

## 2019-05-27 RX ADMIN — SODIUM CHLORIDE 3 MILLILITER(S): 9 INJECTION INTRAMUSCULAR; INTRAVENOUS; SUBCUTANEOUS at 05:44

## 2019-05-27 RX ADMIN — GABAPENTIN 300 MILLIGRAM(S): 400 CAPSULE ORAL at 06:11

## 2019-05-27 RX ADMIN — Medication 650 MILLIGRAM(S): at 22:00

## 2019-05-27 RX ADMIN — SODIUM CHLORIDE 3 MILLILITER(S): 9 INJECTION INTRAMUSCULAR; INTRAVENOUS; SUBCUTANEOUS at 14:41

## 2019-05-27 RX ADMIN — SENNA PLUS 2 TABLET(S): 8.6 TABLET ORAL at 21:19

## 2019-05-27 NOTE — PROGRESS NOTE ADULT - SUBJECTIVE AND OBJECTIVE BOX
CC: leg ulcer    Patient seen and examined at the bedside. No acute overnight events. no events yesterday. Complaining of RLE neuropathic pain + neuropathy still this AM. Denies fever/chills, headache, lightheadedness, dizziness, chest pain, palpitations, shortness of breath, cough, abd pain, nausea/vomiting/diarrhea      =========================================================================================    PHYSICAL EXAM.    GEN - appears younger than age. thin. pleasant. no distress.   HEENT - NCAT, EOMI, KUNAL  RESP - CTA BL, no wheeze/stridor/rhonchi/crackles. not on supplemental O2. able to speak in full sentences without distress.   CARDIO - NS1S2, RRR. No murmurs/rubs/gallops.  ABD - Soft/Non tender/Non distended. normoactive BS x4 quadrants. no guarding/rebound tenderness.  Ext - RLE edema. RLE wrapped in gauze, pt declined to have me undress and view lesion stating that it has improved and that it was just dressed  MSK - BL 5/5 strength on upper and lower extremities.   Neuro - AAOx3. cn 2-12 grossly intact  Psych - normal affect      VITAL SIGNS.    Vital Signs Last 24 Hrs  T(C): 36.6 (27 May 2019 16:05), Max: 36.8 (27 May 2019 00:26)  T(F): 97.8 (27 May 2019 16:05), Max: 98.3 (27 May 2019 00:26)  HR: 76 (27 May 2019 16:05) (68 - 76)  BP: 106/61 (27 May 2019 16:05) (106/61 - 125/66)  BP(mean): --  RR: 18 (27 May 2019 16:05) (18 - 18)  SpO2: 97% (27 May 2019 16:05) (97% - 99%)        =================================================    LABS.                I&O's Summary        ================================================    IMAGING.      ================================================    HOME MEDS.    Home Medications:  acetaminophen 325 mg oral tablet: 2 tab(s) orally every 6 hours, As needed, Temp greater or equal to 38C (100.4F), Mild Pain (1 - 3) (25 May 2019 05:48)  collagenase 250 units/g topical ointment: 1 application topically once a day (25 May 2019 05:48)  docusate sodium 100 mg oral capsule: 1 cap(s) orally 2 times a day (25 May 2019 05:48)  Endocet 5/325 oral tablet: 2 tab(s) orally every 4 hours, As needed, Severe Pain (7 - 10) (25 May 2019 05:48)  lactulose 10 g/15 mL oral syrup: 15 milliliter(s) orally every 8 hours (25 May 2019 05:48)  Multiple Vitamins oral tablet: 1 tab(s) orally once a day (25 May 2019 05:48)  naproxen 375 mg oral tablet: 1 tab(s) orally every 8 hours, As needed, Mild Pain (1 - 3). alternate with acetaminophen (25 May 2019 05:48)  polyethylene glycol 3350 oral powder for reconstitution: 17 gram(s) orally once a day (25 May 2019 05:48)  senna oral tablet: 2 tab(s) orally once a day (at bedtime) (25 May 2019 05:48)      ================================================    HOSPITAL MEDS.    MEDICATIONS  (STANDING):  collagenase Ointment 1 Application(s) Topical daily  docusate sodium 100 milliGRAM(s) Oral two times a day  enoxaparin Injectable 40 milliGRAM(s) SubCutaneous every 24 hours  gabapentin 300 milliGRAM(s) Oral two times a day  lactulose Syrup 10 Gram(s) Oral every 8 hours  multivitamin 1 Tablet(s) Oral daily  polyethylene glycol 3350 17 Gram(s) Oral daily  senna 2 Tablet(s) Oral at bedtime  sodium chloride 0.9% lock flush 3 milliLiter(s) IV Push every 8 hours    MEDICATIONS  (PRN):  acetaminophen   Tablet .. 650 milliGRAM(s) Oral every 6 hours PRN Temp greater or equal to 38C (100.4F), Mild Pain (1 - 3)  diphenhydrAMINE 25 milliGRAM(s) Oral every 6 hours PRN Rash and/or Itching  naproxen 375 milliGRAM(s) Oral every 8 hours PRN Mild Pain (1 - 3). alternate with acetaminophen  ondansetron Injectable 4 milliGRAM(s) IV Push every 6 hours PRN Nausea  oxyCODONE    5 mG/acetaminophen 325 mG 2 Tablet(s) Oral every 4 hours PRN Severe Pain (7 - 10)

## 2019-05-27 NOTE — PROGRESS NOTE ADULT - ASSESSMENT
79 y/o female with RLE venous stasis ulcer from varicose veins with superimposed cellulitis, Hypercalcemia, Hyperproteinemia, mild anemia    #neuropathy, RLE.   cont gabapentin, titrate up to 300mg tid  cont titrate to pain control    #sirs. resolved  new onset 5/22  ? uti, pt was complaining of urinary freq, otherwise no s/s of of infection, only poss source seems to be urine  urinary freq improved on abx  crp now improved, discussed w/ ID briefly, poor marker for infection given pt characteristics, procal unremarkable for systemic infection  neg blood cx x2 5/22  ua/uc neg, both done after abx given  sp rocephin x3d course    #Right lower ext skin ulcer. stable  recurrent lesion, hx of treatment @ [facility]. present on + off x3yrs. 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 @ this time, venous ulcer favored as likely dx but cannot definitively discern  Plan:  wound care Vascular surgery recommend collagenase, compression. local wound care.    ID consult appreciated, sp ancef for cellulitis, stopped 5/21  derm eval appreciated, sp wedge bx 5/22 bedside by acs as recommended, well tolerated uncomplicated, cx pending 5/23  derm to fu results as outpt, Dr Angel mills empiric 5d course minocycline for pyoderma  Vascular surgery consult appreciated, CATE- showing no arterial compromise  prn pain control  outpt fu w/ vasc + derm    #Cellulitis of right lower extremity. not truly present per ID     #Hypercalcemia.  resolved     #Stasis dermatitis: stable, chronic. sec to mild PVD on RLE  Plan: supportive care  elevate RLE    #hypoglycemia. asymptomatic, resolved  a1c 5.4  encourage steady oral intake    #constipation. resolved  confirmed on xray to have mod impaction  bowel regimen    #dvt ppx. lovenox    #dispo. PT eval appreciated, [facility] vs. home w/ PT, discussed w/ pt + family, prefer [facility] but pt w/o active insurance to cover that or home care, as she does not have assist for wound care and other needs, not safe dc until medicaid established, still pending, discussed w/ CM, will follow of 5/28 to see if its gone through after holiday    #outpt fu. pmd - has none, needs referral on dc, vasc sx Dr Adames, derm Dr Ortiz

## 2019-05-28 ENCOUNTER — TRANSCRIPTION ENCOUNTER (OUTPATIENT)
Age: 79
End: 2019-05-28

## 2019-05-28 PROCEDURE — 99232 SBSQ HOSP IP/OBS MODERATE 35: CPT

## 2019-05-28 RX ORDER — LACTULOSE 10 G/15ML
15 SOLUTION ORAL
Qty: 1350 | Refills: 0
Start: 2019-05-28 | End: 2019-06-26

## 2019-05-28 RX ORDER — COLLAGENASE CLOSTRIDIUM HIST. 250 UNIT/G
1 OINTMENT (GRAM) TOPICAL
Qty: 1 | Refills: 0
Start: 2019-05-28 | End: 2019-06-26

## 2019-05-28 RX ORDER — GABAPENTIN 400 MG/1
1 CAPSULE ORAL
Qty: 90 | Refills: 0
Start: 2019-05-28 | End: 2019-06-26

## 2019-05-28 RX ADMIN — ENOXAPARIN SODIUM 40 MILLIGRAM(S): 100 INJECTION SUBCUTANEOUS at 18:14

## 2019-05-28 RX ADMIN — SODIUM CHLORIDE 3 MILLILITER(S): 9 INJECTION INTRAMUSCULAR; INTRAVENOUS; SUBCUTANEOUS at 05:38

## 2019-05-28 RX ADMIN — Medication 100 MILLIGRAM(S): at 05:40

## 2019-05-28 RX ADMIN — Medication 100 MILLIGRAM(S): at 18:14

## 2019-05-28 RX ADMIN — GABAPENTIN 300 MILLIGRAM(S): 400 CAPSULE ORAL at 13:59

## 2019-05-28 RX ADMIN — Medication 375 MILLIGRAM(S): at 05:40

## 2019-05-28 RX ADMIN — LACTULOSE 10 GRAM(S): 10 SOLUTION ORAL at 22:23

## 2019-05-28 RX ADMIN — LACTULOSE 10 GRAM(S): 10 SOLUTION ORAL at 05:39

## 2019-05-28 RX ADMIN — GABAPENTIN 300 MILLIGRAM(S): 400 CAPSULE ORAL at 22:23

## 2019-05-28 RX ADMIN — Medication 375 MILLIGRAM(S): at 06:17

## 2019-05-28 RX ADMIN — SODIUM CHLORIDE 3 MILLILITER(S): 9 INJECTION INTRAMUSCULAR; INTRAVENOUS; SUBCUTANEOUS at 22:30

## 2019-05-28 RX ADMIN — POLYETHYLENE GLYCOL 3350 17 GRAM(S): 17 POWDER, FOR SOLUTION ORAL at 11:47

## 2019-05-28 RX ADMIN — OXYCODONE AND ACETAMINOPHEN 2 TABLET(S): 5; 325 TABLET ORAL at 22:28

## 2019-05-28 RX ADMIN — SENNA PLUS 2 TABLET(S): 8.6 TABLET ORAL at 22:23

## 2019-05-28 RX ADMIN — SODIUM CHLORIDE 3 MILLILITER(S): 9 INJECTION INTRAMUSCULAR; INTRAVENOUS; SUBCUTANEOUS at 13:57

## 2019-05-28 RX ADMIN — Medication 1 APPLICATION(S): at 11:45

## 2019-05-28 RX ADMIN — GABAPENTIN 300 MILLIGRAM(S): 400 CAPSULE ORAL at 05:39

## 2019-05-28 RX ADMIN — Medication 1 TABLET(S): at 11:46

## 2019-05-28 RX ADMIN — OXYCODONE AND ACETAMINOPHEN 2 TABLET(S): 5; 325 TABLET ORAL at 22:58

## 2019-05-28 NOTE — CHART NOTE - NSCHARTNOTEFT_GEN_A_CORE
Upon Nutritional Assessment by the Registered Dietitian your patient was determined to meet criteria / has evidence of the following diagnosis/diagnoses:          [x] Severe chronic Protein Calorie Malnutrition    Findings as based on:  •  Comprehensive nutrition assessment and consultation  •  Calorie counts (nutrient intake analysis)  •  Food acceptance and intake status from observations by staff  •  Follow up  •  Patient education  •  Intervention secondary to interdisciplinary rounds  •   concerns    Pt presents with malnutrition (severe chronic) related to inadequate protein-energy intake in setting of difficulty with procurement and preparation of meals resulting in decreased PO intake  as evidenced by meeting <75% protein-energy needs > 1mo, significant and unintentional 35lb (~22%) wt loss unknown time frame though likely ~1 year, severe muscle wasting clavicle, shoulders, temples; severe fat loss buccals, thoracic.     Treatment:    The following diet has been recommended:  1) Ensure enlive TID   2) Rob BID   3) Vit C 500mg daily   4) Provide encouragement/assistance as needed during mealtimes to inc PO       PROVIDER Section:     By signing this assessment you are acknowledging and agree with the diagnosis/diagnoses assigned by the Registered Dietitian    Comments:

## 2019-05-28 NOTE — DISCHARGE NOTE PROVIDER - HOSPITAL COURSE
77 y/o female with RLE venous stasis ulcer from varicose veins with superimposed cellulitis, Hypercalcemia, Hyperproteinemia, mild anemia        #neuropathy, RLE.     started gabapentin during admit, titrate up to desired effect        #sirs. resolved    new onset 5/22, suspected sec to uti    pt was complaining of urinary freq, otherwise no s/s of of infection, only poss source seems to be urine    neg blood cx x2 5/22    ua/uc neg, both done after abx given    sp rocephin x3d course        #Right lower ext skin ulcer. stable    recurrent lesion, hx of treatment @ [facility]. present on + off x3yrs. 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 @ this time, venous ulcer favored as likely dx but cannot definitively discern    wound care Vascular surgery recommend collagenase, compression. local wound care.      ID consult appreciated, sp ancef for cellulitis, stopped 5/21    derm eval appreciated, sp wedge bx 5/22 bedside by acs as recommended, well tolerated uncomplicated, cx pending 5/23    derm to fu results as outpt, Dr Ortiz    sp empiric 5d course minocycline for pyoderma    Vascular surgery consult appreciated, CATE- showing no arterial compromise    prn pain control    outpt fu w/ vasc + derm        #Cellulitis of right lower extremity. not truly present per ID         #Hypercalcemia.  resolved         #Stasis dermatitis: stable, chronic. sec to mild PVD on RLE    supportive care    elevate RLE        #hypoglycemia. asymptomatic, resolved    a1c 5.4    encourage steady oral intake        #constipation. resolved    confirmed on xray to have mod impaction    bowel regimen        #outpt fu. pmd - has none, needs referral on dc, vasc sx Dr Adames, derm Dr Ortiz 77 y/o female with RLE venous stasis ulcer from varicose veins with superimposed cellulitis, Hypercalcemia, Hyperproteinemia, mild anemia        #neuropathy, RLE.     started gabapentin during admit, titrate up to desired effect        #sirs. resolved    new onset 5/22, suspected sec to uti    pt was complaining of urinary freq, otherwise no s/s of of infection, only poss source seems to be urine    neg blood cx x2 5/22    ua/uc neg, both done after abx given    sp rocephin x3d course        #Right lower ext skin ulcer. stable    recurrent lesion, hx of treatment @ [facility]. present on + off x3yrs. 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 @ this time, venous ulcer favored as likely dx but cannot definitively discern    wound care Vascular surgery recommend collagenase, compression. local wound care.      ID consult appreciated, sp ancef for cellulitis, stopped 5/21    derm eval appreciated, sp wedge bx 5/22 bedside by acs as recommended, well tolerated uncomplicated, cx pending 5/23    derm to fu results as outpt, Dr Ortiz    sp empiric 5d course minocycline for pyoderma    Vascular surgery consult appreciated, CATE- showing no arterial compromise    prn pain control    outpt fu w/ vasc + derm        #Cellulitis of right lower extremity. not truly present per ID         #Hypercalcemia.  resolved         #Stasis dermatitis: stable, chronic. sec to mild PVD on RLE    supportive care    elevate RLE        #hypoglycemia. asymptomatic, resolved    a1c 5.4    encourage steady oral intake        #constipation. resolved    confirmed on xray to have mod impaction    bowel regimen        #outpt fu. pmd - has none, needs referral on dc, vasc sx Dr Adames, derm Dr Ortiz        Vital Signs Last 24 Hrs    T(C): 36.4 (29 May 2019 08:19), Max: 36.8 (28 May 2019 16:09)    T(F): 97.6 (29 May 2019 08:19), Max: 98.2 (28 May 2019 16:09)    HR: 64 (29 May 2019 08:19) (64 - 74)    BP: 116/60 (29 May 2019 08:19) (115/64 - 120/69)    BP(mean): --    RR: 18 (29 May 2019 08:19) (18 - 18)    SpO2: 96% (29 May 2019 08:19) (94% - 99%)        PHYSICAL EXAM.        GEN - appears younger than age. thin. pleasant. no distress.     HEENT - NCAT, EOMI, KUNAL    RESP - CTA BL, no wheeze/stridor/rhonchi/crackles. not on supplemental O2. able to speak in full sentences without distress.     CARDIO - NS1S2, RRR. No murmurs/rubs/gallops.    ABD - Soft/Non tender/Non distended. normoactive BS x4 quadrants. no guarding/rebound tenderness.    Ext - RLE edema. RLE wrapped in gauze    MSK - BL 5/5 strength on upper and lower extremities.     Neuro - AAOx3. cn 2-12 grossly intact    Psych - normal affect        All electrolyte abnormalities were monitored carefully and repleted as necessary during this hospitalization. At the time of discharge patient was hemodynamically stable and amenable to all terms of discharge. The patient has received verbal instructions from myself regarding discharge plans.         Length of Discharge: 45MIN

## 2019-05-28 NOTE — DIETITIAN INITIAL EVALUATION ADULT. - PHYSICAL APPEARANCE
BMI 29.3; 1+ R/L leg edema BMI 22.9 calculated with RD standing scale weight 125lbs; 1+ R/L leg edema; NFPE conducted, physical findings include severe muscle wasting clavicle, shoulders, temples; severe fat loss buccals, thoracic region

## 2019-05-28 NOTE — DIETITIAN INITIAL EVALUATION ADULT. - OTHER INFO
Pt admitted with RLE venous stasis ulcer, hypercalcemia, and constipation now resolved. Pt tolerating regular diet, consuming >75% of meals. Noted HgA1c 5.4, low for age, consider monitor BG. Pt admitted with RLE venous stasis ulcer, hypercalcemia, and constipation now resolved. Pt reports weight 3-4yrs ago ~190lbs, with weight loss over a few years and then a consistent weight at ~160lbs for the last year. Per standing scale weight, Pt appears to have lost 35lbs in unknown time frame though likely within the last year. Weight loss consistent with findings of NFPE. Pt has difficulty ambulating and standing for prolonged period of time making meal preparation difficult. Pt PO intake improving at this time.

## 2019-05-28 NOTE — DISCHARGE NOTE PROVIDER - NSDCCPCAREPLAN_GEN_ALL_CORE_FT
PRINCIPAL DISCHARGE DIAGNOSIS  Diagnosis: Ulcer of right leg  Assessment and Plan of Treatment: You have a biopsy or tissue sampling done during this admission and unfortunately the results have not yet returned. Be sure to follow up with your Primary Care Doctor and/or any advised specialists in order to get the results so that action on them can be taken as needed. It normally takes 1-2 weeks for these results to be completed.   Be sure to follow up the recommendations for caring for the ulcer.      SECONDARY DISCHARGE DIAGNOSES  Diagnosis: Constipation  Assessment and Plan of Treatment: You were noted to have troubles with constipation. This may be related to your recent illness or the fact that you were in the hospital and eating things you may not always eat. However, if you do have issues with constipation, you may take over the counter stool softeners such as colace daily. In addition, from time to time you may add laxative medications such as Miralax and later senna. Over the counter enemas can also be used as needed. Be sure to drink lots of water daily, at least 8 glasses, unless you have been advised to be on a fluid restricted diet. If you note that you are relying on laxatives too often, be sure to discuss this with your Primary Care Doctor or see a Gastroenterologist to avoid causing your body to become dependent on laxative medications.    Diagnosis: Hypoglycemia  Assessment and Plan of Treatment: You were treated for this condition and at this time it is considered resolved. You may follow up with your doctors as an outpatient.    Diagnosis: SIRS without acute organ dysfunction due to infectious process  Assessment and Plan of Treatment: You were treated for this condition and at this time it is considered resolved. You may follow up with your doctors as an outpatient.   You likely have a Urinary tract infection that has been treated    Diagnosis: Neuropathy  Assessment and Plan of Treatment: Continue all medications as prescribed.

## 2019-05-28 NOTE — PROGRESS NOTE ADULT - PROVIDER SPECIALTY LIST ADULT
Hospitalist
Infectious Disease
Infectious Disease
Vascular Surgery
Vascular Surgery
Infectious Disease

## 2019-05-28 NOTE — DISCHARGE NOTE PROVIDER - PROVIDER TOKENS
PROVIDER:[TOKEN:[[id number]],FOLLOWUP:[1 week]],PROVIDER:[TOKEN:[[id number]],FOLLOWUP:[2 weeks]]

## 2019-05-28 NOTE — PROGRESS NOTE ADULT - ASSESSMENT
79 y/o female with RLE venous stasis ulcer from varicose veins with superimposed cellulitis, Hypercalcemia, Hyperproteinemia, mild anemia    #neuropathy, RLE. controlled  cont gabapentin tid  cont titrate to pain control as outpt    #sirs. resolved  new onset 5/22  ? uti, pt was complaining of urinary freq, otherwise no s/s of of infection, only poss source seems to be urine  urinary freq improved on abx  crp now improved, discussed w/ ID briefly, poor marker for infection given pt characteristics, procal unremarkable for systemic infection  neg blood cx x2 5/22  ua/uc neg, both done after abx given  sp rocephin x3d course    #Right lower ext skin ulcer. stable  recurrent lesion, hx of treatment @ [facility]. present on + off x3yrs. 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 @ this time, venous ulcer favored as likely dx but cannot definitively discern  Plan:  wound care Vascular surgery recommend collagenase, compression. local wound care.    ID consult appreciated, sp ancef for cellulitis, stopped 5/21  derm eval appreciated, sp wedge bx 5/22 bedside by acs as recommended, well tolerated uncomplicated, cx pending 5/23  derm to fu results as outpt, Dr Ortiz  sp empiric 5d course minocycline for pyoderma  Vascular surgery consult appreciated, CATE- showing no arterial compromise  prn pain control  outpt fu w/ vasc + derm    #Cellulitis of right lower extremity. not truly present per ID     #Hypercalcemia.  resolved     #Stasis dermatitis: stable, chronic. sec to mild PVD on RLE  Plan: supportive care  elevate RLE    #hypoglycemia. asymptomatic, resolved  a1c 5.4  encourage steady oral intake    #constipation. resolved  confirmed on xray to have mod impaction  bowel regimen    #dvt ppx. lovenox    #dispo. dc home in am. discussed w/ pt omar Power who she lives with, she will be here in am to get instruction on wound care for RLE ulcer and will take patient home as they live together. unable to get home PT given lack of insurance coverage    #outpt fu. pmd - has none, needs referral on dc, vasc sx Dr Adames, derm Dr Ortiz

## 2019-05-28 NOTE — DISCHARGE NOTE PROVIDER - NSDCFUADDINST_GEN_ALL_CORE_FT
TO THE RT LEG ULCER:  -apply collagenase daily + keep the area covered w/ dry clean dressing  -keep your right leg elevated when in bed    XRAY ABDOMEN:  : Nonobstructed bowel gas pattern with moderate stool   throughout the colon    US ARTERIAL LEGS:  Limited evaluation of the right calf arteries secondary to an open wound.    No evidence of a hemodynamically significant stenosis in the imaged lower   extremity arteries bilaterally.    US VENOUS LEGS:  No evidence of bilateral lower extremity deep venous thrombosis    CT ABDOMEN PELVIS:  No acute intra-abdominal pathology on CT.    Normal appendix.    Mild pancreatic ductal dilatation up to 5 mm. Consider further evaluation   with nonemergent MRI evaluation for underlying pathology, if no   contraindications exist    Hemoglobin A1C, Whole Blood: 5.4 % (05.17.19 @ 20:56) TO THE RT LEG ULCER:  -clean with saline or water + pat dry  -apply a thin layer of collagenase daily + keep the area covered w/ dry clean dressing  -keep your right leg elevated when in bed    XRAY ABDOMEN:  : Nonobstructed bowel gas pattern with moderate stool   throughout the colon    US ARTERIAL LEGS:  Limited evaluation of the right calf arteries secondary to an open wound.    No evidence of a hemodynamically significant stenosis in the imaged lower   extremity arteries bilaterally.    US VENOUS LEGS:  No evidence of bilateral lower extremity deep venous thrombosis    CT ABDOMEN PELVIS:  No acute intra-abdominal pathology on CT.    Normal appendix.    Mild pancreatic ductal dilatation up to 5 mm. Consider further evaluation   with nonemergent MRI evaluation for underlying pathology, if no   contraindications exist    Hemoglobin A1C, Whole Blood: 5.4 % (05.17.19 @ 20:56)

## 2019-05-28 NOTE — PROGRESS NOTE ADULT - REASON FOR ADMISSION
right leg pain, cellulitis

## 2019-05-28 NOTE — PROGRESS NOTE ADULT - SUBJECTIVE AND OBJECTIVE BOX
CC: leg ulcer    Patient seen and examined at the bedside. No acute overnight events. no events yesterday. Complaining of nothing this AM. Denies fever/chills, headache, lightheadedness, dizziness, chest pain, palpitations, shortness of breath, cough, abd pain, nausea/vomiting/diarrhea      =========================================================================================    PHYSICAL EXAM.    GEN - appears younger than age. thin. pleasant. no distress.   HEENT - NCAT, EOMI, KUNAL  RESP - CTA BL, no wheeze/stridor/rhonchi/crackles. not on supplemental O2. able to speak in full sentences without distress.   CARDIO - NS1S2, RRR. No murmurs/rubs/gallops.  ABD - Soft/Non tender/Non distended. normoactive BS x4 quadrants. no guarding/rebound tenderness.  Ext - RLE edema. RLE wrapped in gauze  MSK - BL 5/5 strength on upper and lower extremities.   Neuro - AAOx3. cn 2-12 grossly intact  Psych - normal affect      VITAL SIGNS.    Vital Signs Last 24 Hrs  T(C): 36.8 (28 May 2019 16:09), Max: 36.8 (28 May 2019 07:51)  T(F): 98.2 (28 May 2019 16:09), Max: 98.3 (28 May 2019 07:51)  HR: 74 (28 May 2019 16:09) (66 - 74)  BP: 120/69 (28 May 2019 16:09) (110/64 - 129/74)  BP(mean): --  RR: 18 (28 May 2019 16:09) (18 - 18)  SpO2: 99% (28 May 2019 16:09) (95% - 99%)        =================================================    LABS.                I&O's Summary        ================================================    IMAGING.      ================================================    HOME MEDS.    Home Medications:  acetaminophen 325 mg oral tablet: 2 tab(s) orally every 6 hours, As needed, Temp greater or equal to 38C (100.4F), Mild Pain (1 - 3) (25 May 2019 05:48)  docusate sodium 100 mg oral capsule: 1 cap(s) orally 2 times a day (25 May 2019 05:48)  Multiple Vitamins oral tablet: 1 tab(s) orally once a day (25 May 2019 05:48)  naproxen 375 mg oral tablet: 1 tab(s) orally every 8 hours, As needed, Mild Pain (1 - 3). alternate with acetaminophen (25 May 2019 05:48)  polyethylene glycol 3350 oral powder for reconstitution: 17 gram(s) orally once a day (25 May 2019 05:48)  senna oral tablet: 2 tab(s) orally once a day (at bedtime) (25 May 2019 05:48)      ================================================    HOSPITAL MEDS.    MEDICATIONS  (STANDING):  collagenase Ointment 1 Application(s) Topical daily  docusate sodium 100 milliGRAM(s) Oral two times a day  enoxaparin Injectable 40 milliGRAM(s) SubCutaneous every 24 hours  gabapentin 300 milliGRAM(s) Oral three times a day  lactulose Syrup 10 Gram(s) Oral every 8 hours  multivitamin 1 Tablet(s) Oral daily  polyethylene glycol 3350 17 Gram(s) Oral daily  senna 2 Tablet(s) Oral at bedtime  sodium chloride 0.9% lock flush 3 milliLiter(s) IV Push every 8 hours    MEDICATIONS  (PRN):  acetaminophen   Tablet .. 650 milliGRAM(s) Oral every 6 hours PRN Temp greater or equal to 38C (100.4F), Mild Pain (1 - 3)  diphenhydrAMINE 25 milliGRAM(s) Oral every 6 hours PRN Rash and/or Itching  naproxen 375 milliGRAM(s) Oral every 8 hours PRN Mild Pain (1 - 3). alternate with acetaminophen  ondansetron Injectable 4 milliGRAM(s) IV Push every 6 hours PRN Nausea  oxyCODONE    5 mG/acetaminophen 325 mG 2 Tablet(s) Oral every 4 hours PRN Severe Pain (7 - 10)

## 2019-05-28 NOTE — DISCHARGE NOTE PROVIDER - CARE PROVIDER_API CALL
Gaye Ortiz)  Dermatology  [address] [place] [address]  Phone: [phone]  Fax: [phone]  Follow Up Time: 1 week    Gaye Adames)  Surgery; Surgical Critical Care  [address] [place] [address]  Phone: [phone]  Fax: [phone]  Follow Up Time: 2 weeks

## 2019-05-29 ENCOUNTER — TRANSCRIPTION ENCOUNTER (OUTPATIENT)
Age: 79
End: 2019-05-29

## 2019-05-29 VITALS
TEMPERATURE: 98 F | HEART RATE: 72 BPM | RESPIRATION RATE: 18 BRPM | OXYGEN SATURATION: 96 % | DIASTOLIC BLOOD PRESSURE: 64 MMHG | SYSTOLIC BLOOD PRESSURE: 122 MMHG

## 2019-05-29 PROBLEM — Z00.00 ENCOUNTER FOR PREVENTIVE HEALTH EXAMINATION: Status: ACTIVE | Noted: 2019-05-29

## 2019-05-29 PROCEDURE — 74177 CT ABD & PELVIS W/CONTRAST: CPT

## 2019-05-29 PROCEDURE — 83036 HEMOGLOBIN GLYCOSYLATED A1C: CPT

## 2019-05-29 PROCEDURE — 85027 COMPLETE CBC AUTOMATED: CPT

## 2019-05-29 PROCEDURE — 88305 TISSUE EXAM BY PATHOLOGIST: CPT

## 2019-05-29 PROCEDURE — 99239 HOSP IP/OBS DSCHRG MGMT >30: CPT

## 2019-05-29 PROCEDURE — 96365 THER/PROPH/DIAG IV INF INIT: CPT

## 2019-05-29 PROCEDURE — 87186 SC STD MICRODIL/AGAR DIL: CPT

## 2019-05-29 PROCEDURE — 96375 TX/PRO/DX INJ NEW DRUG ADDON: CPT

## 2019-05-29 PROCEDURE — 87116 MYCOBACTERIA CULTURE: CPT

## 2019-05-29 PROCEDURE — 97116 GAIT TRAINING THERAPY: CPT

## 2019-05-29 PROCEDURE — 87086 URINE CULTURE/COLONY COUNT: CPT

## 2019-05-29 PROCEDURE — 87070 CULTURE OTHR SPECIMN AEROBIC: CPT

## 2019-05-29 PROCEDURE — 81001 URINALYSIS AUTO W/SCOPE: CPT

## 2019-05-29 PROCEDURE — 80048 BASIC METABOLIC PNL TOTAL CA: CPT

## 2019-05-29 PROCEDURE — 99285 EMERGENCY DEPT VISIT HI MDM: CPT | Mod: 25

## 2019-05-29 PROCEDURE — 93923 UPR/LXTR ART STDY 3+ LVLS: CPT

## 2019-05-29 PROCEDURE — 93925 LOWER EXTREMITY STUDY: CPT

## 2019-05-29 PROCEDURE — 74018 RADEX ABDOMEN 1 VIEW: CPT

## 2019-05-29 PROCEDURE — 96367 TX/PROPH/DG ADDL SEQ IV INF: CPT

## 2019-05-29 PROCEDURE — 86140 C-REACTIVE PROTEIN: CPT

## 2019-05-29 PROCEDURE — 80053 COMPREHEN METABOLIC PANEL: CPT

## 2019-05-29 PROCEDURE — 93970 EXTREMITY STUDY: CPT

## 2019-05-29 PROCEDURE — 87015 SPECIMEN INFECT AGNT CONCNTJ: CPT

## 2019-05-29 PROCEDURE — 87040 BLOOD CULTURE FOR BACTERIA: CPT

## 2019-05-29 PROCEDURE — 84165 PROTEIN E-PHORESIS SERUM: CPT

## 2019-05-29 PROCEDURE — 86334 IMMUNOFIX E-PHORESIS SERUM: CPT

## 2019-05-29 PROCEDURE — 87102 FUNGUS ISOLATION CULTURE: CPT

## 2019-05-29 PROCEDURE — 84145 PROCALCITONIN (PCT): CPT

## 2019-05-29 PROCEDURE — 85652 RBC SED RATE AUTOMATED: CPT

## 2019-05-29 PROCEDURE — 97110 THERAPEUTIC EXERCISES: CPT

## 2019-05-29 PROCEDURE — 36415 COLL VENOUS BLD VENIPUNCTURE: CPT

## 2019-05-29 PROCEDURE — 97530 THERAPEUTIC ACTIVITIES: CPT

## 2019-05-29 PROCEDURE — 84155 ASSAY OF PROTEIN SERUM: CPT

## 2019-05-29 PROCEDURE — 87206 SMEAR FLUORESCENT/ACID STAI: CPT

## 2019-05-29 PROCEDURE — 97163 PT EVAL HIGH COMPLEX 45 MIN: CPT

## 2019-05-29 PROCEDURE — 83605 ASSAY OF LACTIC ACID: CPT

## 2019-05-29 RX ADMIN — SODIUM CHLORIDE 3 MILLILITER(S): 9 INJECTION INTRAMUSCULAR; INTRAVENOUS; SUBCUTANEOUS at 06:26

## 2019-05-29 RX ADMIN — SODIUM CHLORIDE 3 MILLILITER(S): 9 INJECTION INTRAMUSCULAR; INTRAVENOUS; SUBCUTANEOUS at 12:16

## 2019-05-29 RX ADMIN — GABAPENTIN 300 MILLIGRAM(S): 400 CAPSULE ORAL at 06:26

## 2019-05-29 RX ADMIN — LACTULOSE 10 GRAM(S): 10 SOLUTION ORAL at 06:26

## 2019-05-29 RX ADMIN — GABAPENTIN 300 MILLIGRAM(S): 400 CAPSULE ORAL at 13:02

## 2019-05-29 RX ADMIN — Medication 1 TABLET(S): at 11:04

## 2019-05-29 RX ADMIN — Medication 100 MILLIGRAM(S): at 06:26

## 2019-05-29 RX ADMIN — Medication 1 APPLICATION(S): at 11:04

## 2019-05-29 NOTE — DISCHARGE NOTE NURSING/CASE MANAGEMENT/SOCIAL WORK - NSDCDPATPORTLINK_GEN_ALL_CORE
You can access the [facility] Patient Portal, offered by [facility], by registering with the following website: [email or web address]

## 2019-05-30 ENCOUNTER — RESULT REVIEW (OUTPATIENT)
Age: 79
End: 2019-05-30

## 2019-06-17 LAB
CULTURE RESULTS: SIGNIFICANT CHANGE UP
SPECIMEN SOURCE: SIGNIFICANT CHANGE UP

## 2019-07-13 LAB
CULTURE RESULTS: SIGNIFICANT CHANGE UP
SPECIMEN SOURCE: SIGNIFICANT CHANGE UP

## 2020-02-07 ENCOUNTER — INPATIENT (INPATIENT)
Facility: HOSPITAL | Age: 80
LOS: 3 days | Discharge: HOME | DRG: 603 | End: 2020-02-11
Attending: HOSPITALIST | Admitting: HOSPITALIST
Payer: MEDICAID

## 2020-02-07 VITALS
DIASTOLIC BLOOD PRESSURE: 72 MMHG | SYSTOLIC BLOOD PRESSURE: 154 MMHG | HEART RATE: 91 BPM | RESPIRATION RATE: 20 BRPM | TEMPERATURE: 98 F | OXYGEN SATURATION: 98 %

## 2020-02-07 DIAGNOSIS — L03.90 CELLULITIS, UNSPECIFIED: ICD-10-CM

## 2020-02-07 DIAGNOSIS — R09.89 OTHER SPECIFIED SYMPTOMS AND SIGNS INVOLVING THE CIRCULATORY AND RESPIRATORY SYSTEMS: ICD-10-CM

## 2020-02-07 PROBLEM — I86.8 VARICOSE VEINS OF OTHER SPECIFIED SITES: Chronic | Status: ACTIVE | Noted: 2019-05-18

## 2020-02-07 PROBLEM — I87.2 VENOUS INSUFFICIENCY (CHRONIC) (PERIPHERAL): Chronic | Status: ACTIVE | Noted: 2019-05-18

## 2020-02-07 LAB
ALBUMIN SERPL ELPH-MCNC: 3.9 G/DL — SIGNIFICANT CHANGE UP (ref 3.3–5.2)
ALP SERPL-CCNC: 98 U/L — SIGNIFICANT CHANGE UP (ref 40–120)
ALT FLD-CCNC: 10 U/L — SIGNIFICANT CHANGE UP
ANION GAP SERPL CALC-SCNC: 11 MMOL/L — SIGNIFICANT CHANGE UP (ref 5–17)
AST SERPL-CCNC: 21 U/L — SIGNIFICANT CHANGE UP
BASOPHILS # BLD AUTO: 0.03 K/UL — SIGNIFICANT CHANGE UP (ref 0–0.2)
BASOPHILS NFR BLD AUTO: 0.7 % — SIGNIFICANT CHANGE UP (ref 0–2)
BILIRUB SERPL-MCNC: 0.4 MG/DL — SIGNIFICANT CHANGE UP (ref 0.4–2)
BUN SERPL-MCNC: 10 MG/DL — SIGNIFICANT CHANGE UP (ref 8–20)
CALCIUM SERPL-MCNC: 10 MG/DL — SIGNIFICANT CHANGE UP (ref 8.6–10.2)
CHLORIDE SERPL-SCNC: 101 MMOL/L — SIGNIFICANT CHANGE UP (ref 98–107)
CO2 SERPL-SCNC: 27 MMOL/L — SIGNIFICANT CHANGE UP (ref 22–29)
CREAT SERPL-MCNC: 0.97 MG/DL — SIGNIFICANT CHANGE UP (ref 0.5–1.3)
CRP SERPL-MCNC: 2.61 MG/DL — HIGH (ref 0–0.4)
EOSINOPHIL # BLD AUTO: 0.08 K/UL — SIGNIFICANT CHANGE UP (ref 0–0.5)
EOSINOPHIL NFR BLD AUTO: 1.8 % — SIGNIFICANT CHANGE UP (ref 0–6)
ERYTHROCYTE [SEDIMENTATION RATE] IN BLOOD: 48 MM/HR — HIGH (ref 0–20)
GLUCOSE SERPL-MCNC: 97 MG/DL — SIGNIFICANT CHANGE UP (ref 70–99)
HCT VFR BLD CALC: 34.1 % — LOW (ref 34.5–45)
HGB BLD-MCNC: 10.4 G/DL — LOW (ref 11.5–15.5)
IMM GRANULOCYTES NFR BLD AUTO: 0 % — SIGNIFICANT CHANGE UP (ref 0–1.5)
LACTATE BLDV-MCNC: 0.9 MMOL/L — SIGNIFICANT CHANGE UP (ref 0.5–2)
LYMPHOCYTES # BLD AUTO: 1.23 K/UL — SIGNIFICANT CHANGE UP (ref 1–3.3)
LYMPHOCYTES # BLD AUTO: 27.6 % — SIGNIFICANT CHANGE UP (ref 13–44)
MCHC RBC-ENTMCNC: 28 PG — SIGNIFICANT CHANGE UP (ref 27–34)
MCHC RBC-ENTMCNC: 30.5 GM/DL — LOW (ref 32–36)
MCV RBC AUTO: 91.7 FL — SIGNIFICANT CHANGE UP (ref 80–100)
MONOCYTES # BLD AUTO: 0.48 K/UL — SIGNIFICANT CHANGE UP (ref 0–0.9)
MONOCYTES NFR BLD AUTO: 10.8 % — SIGNIFICANT CHANGE UP (ref 2–14)
NEUTROPHILS # BLD AUTO: 2.63 K/UL — SIGNIFICANT CHANGE UP (ref 1.8–7.4)
NEUTROPHILS NFR BLD AUTO: 59.1 % — SIGNIFICANT CHANGE UP (ref 43–77)
PLATELET # BLD AUTO: 249 K/UL — SIGNIFICANT CHANGE UP (ref 150–400)
POTASSIUM SERPL-MCNC: 4.1 MMOL/L — SIGNIFICANT CHANGE UP (ref 3.5–5.3)
POTASSIUM SERPL-SCNC: 4.1 MMOL/L — SIGNIFICANT CHANGE UP (ref 3.5–5.3)
PROCALCITONIN SERPL-MCNC: 0.07 NG/ML — SIGNIFICANT CHANGE UP (ref 0.02–0.1)
PROT SERPL-MCNC: 7.9 G/DL — SIGNIFICANT CHANGE UP (ref 6.6–8.7)
RBC # BLD: 3.72 M/UL — LOW (ref 3.8–5.2)
RBC # FLD: 13.2 % — SIGNIFICANT CHANGE UP (ref 10.3–14.5)
SODIUM SERPL-SCNC: 139 MMOL/L — SIGNIFICANT CHANGE UP (ref 135–145)
TRANSFERRIN SERPL-MCNC: 210 MG/DL — SIGNIFICANT CHANGE UP (ref 192–382)
WBC # BLD: 4.45 K/UL — SIGNIFICANT CHANGE UP (ref 3.8–10.5)
WBC # FLD AUTO: 4.45 K/UL — SIGNIFICANT CHANGE UP (ref 3.8–10.5)

## 2020-02-07 PROCEDURE — 99222 1ST HOSP IP/OBS MODERATE 55: CPT

## 2020-02-07 PROCEDURE — 99253 IP/OBS CNSLTJ NEW/EST LOW 45: CPT

## 2020-02-07 PROCEDURE — 99223 1ST HOSP IP/OBS HIGH 75: CPT

## 2020-02-07 PROCEDURE — 73590 X-RAY EXAM OF LOWER LEG: CPT | Mod: 26,RT

## 2020-02-07 PROCEDURE — 93971 EXTREMITY STUDY: CPT | Mod: 26,RT

## 2020-02-07 PROCEDURE — 99285 EMERGENCY DEPT VISIT HI MDM: CPT

## 2020-02-07 RX ORDER — MORPHINE SULFATE 50 MG/1
4 CAPSULE, EXTENDED RELEASE ORAL ONCE
Refills: 0 | Status: DISCONTINUED | OUTPATIENT
Start: 2020-02-07 | End: 2020-02-07

## 2020-02-07 RX ORDER — ZINC SULFATE TAB 220 MG (50 MG ZINC EQUIVALENT) 220 (50 ZN) MG
220 TAB ORAL DAILY
Refills: 0 | Status: DISCONTINUED | OUTPATIENT
Start: 2020-02-07 | End: 2020-02-11

## 2020-02-07 RX ORDER — COLLAGENASE CLOSTRIDIUM HIST. 250 UNIT/G
1 OINTMENT (GRAM) TOPICAL
Refills: 0 | Status: DISCONTINUED | OUTPATIENT
Start: 2020-02-07 | End: 2020-02-11

## 2020-02-07 RX ORDER — IBUPROFEN 200 MG
400 TABLET ORAL EVERY 6 HOURS
Refills: 0 | Status: DISCONTINUED | OUTPATIENT
Start: 2020-02-07 | End: 2020-02-08

## 2020-02-07 RX ORDER — PIPERACILLIN AND TAZOBACTAM 4; .5 G/20ML; G/20ML
3.38 INJECTION, POWDER, LYOPHILIZED, FOR SOLUTION INTRAVENOUS ONCE
Refills: 0 | Status: COMPLETED | OUTPATIENT
Start: 2020-02-07 | End: 2020-02-07

## 2020-02-07 RX ORDER — TRAMADOL HYDROCHLORIDE 50 MG/1
50 TABLET ORAL EVERY 8 HOURS
Refills: 0 | Status: DISCONTINUED | OUTPATIENT
Start: 2020-02-07 | End: 2020-02-11

## 2020-02-07 RX ORDER — ENOXAPARIN SODIUM 100 MG/ML
40 INJECTION SUBCUTANEOUS DAILY
Refills: 0 | Status: DISCONTINUED | OUTPATIENT
Start: 2020-02-07 | End: 2020-02-11

## 2020-02-07 RX ORDER — ASCORBIC ACID 60 MG
500 TABLET,CHEWABLE ORAL DAILY
Refills: 0 | Status: DISCONTINUED | OUTPATIENT
Start: 2020-02-07 | End: 2020-02-11

## 2020-02-07 RX ORDER — SODIUM CHLORIDE 9 MG/ML
1000 INJECTION, SOLUTION INTRAVENOUS
Refills: 0 | Status: COMPLETED | OUTPATIENT
Start: 2020-02-07 | End: 2020-02-08

## 2020-02-07 RX ORDER — TRAMADOL HYDROCHLORIDE 50 MG/1
25 TABLET ORAL EVERY 8 HOURS
Refills: 0 | Status: DISCONTINUED | OUTPATIENT
Start: 2020-02-07 | End: 2020-02-11

## 2020-02-07 RX ORDER — PIPERACILLIN AND TAZOBACTAM 4; .5 G/20ML; G/20ML
3.38 INJECTION, POWDER, LYOPHILIZED, FOR SOLUTION INTRAVENOUS EVERY 8 HOURS
Refills: 0 | Status: DISCONTINUED | OUTPATIENT
Start: 2020-02-07 | End: 2020-02-10

## 2020-02-07 RX ORDER — ACETAMINOPHEN 500 MG
650 TABLET ORAL ONCE
Refills: 0 | Status: COMPLETED | OUTPATIENT
Start: 2020-02-07 | End: 2020-02-07

## 2020-02-07 RX ORDER — VANCOMYCIN HCL 1 G
1000 VIAL (EA) INTRAVENOUS ONCE
Refills: 0 | Status: COMPLETED | OUTPATIENT
Start: 2020-02-07 | End: 2020-02-07

## 2020-02-07 RX ORDER — VANCOMYCIN HCL 1 G
1000 VIAL (EA) INTRAVENOUS EVERY 12 HOURS
Refills: 0 | Status: DISCONTINUED | OUTPATIENT
Start: 2020-02-07 | End: 2020-02-07

## 2020-02-07 RX ADMIN — Medication 400 MILLIGRAM(S): at 19:38

## 2020-02-07 RX ADMIN — Medication 500 MILLIGRAM(S): at 18:14

## 2020-02-07 RX ADMIN — TRAMADOL HYDROCHLORIDE 50 MILLIGRAM(S): 50 TABLET ORAL at 18:14

## 2020-02-07 RX ADMIN — TRAMADOL HYDROCHLORIDE 50 MILLIGRAM(S): 50 TABLET ORAL at 19:40

## 2020-02-07 RX ADMIN — Medication 250 MILLIGRAM(S): at 13:16

## 2020-02-07 RX ADMIN — MORPHINE SULFATE 4 MILLIGRAM(S): 50 CAPSULE, EXTENDED RELEASE ORAL at 13:17

## 2020-02-07 RX ADMIN — Medication 400 MILLIGRAM(S): at 23:08

## 2020-02-07 RX ADMIN — ZINC SULFATE TAB 220 MG (50 MG ZINC EQUIVALENT) 220 MILLIGRAM(S): 220 (50 ZN) TAB at 18:14

## 2020-02-07 RX ADMIN — ENOXAPARIN SODIUM 40 MILLIGRAM(S): 100 INJECTION SUBCUTANEOUS at 18:14

## 2020-02-07 RX ADMIN — SODIUM CHLORIDE 75 MILLILITER(S): 9 INJECTION, SOLUTION INTRAVENOUS at 15:32

## 2020-02-07 RX ADMIN — PIPERACILLIN AND TAZOBACTAM 200 GRAM(S): 4; .5 INJECTION, POWDER, LYOPHILIZED, FOR SOLUTION INTRAVENOUS at 11:00

## 2020-02-07 RX ADMIN — PIPERACILLIN AND TAZOBACTAM 25 GRAM(S): 4; .5 INJECTION, POWDER, LYOPHILIZED, FOR SOLUTION INTRAVENOUS at 15:29

## 2020-02-07 RX ADMIN — Medication 650 MILLIGRAM(S): at 13:17

## 2020-02-07 RX ADMIN — MORPHINE SULFATE 4 MILLIGRAM(S): 50 CAPSULE, EXTENDED RELEASE ORAL at 11:01

## 2020-02-07 RX ADMIN — Medication 400 MILLIGRAM(S): at 18:12

## 2020-02-07 RX ADMIN — PIPERACILLIN AND TAZOBACTAM 25 GRAM(S): 4; .5 INJECTION, POWDER, LYOPHILIZED, FOR SOLUTION INTRAVENOUS at 22:22

## 2020-02-07 RX ADMIN — Medication 650 MILLIGRAM(S): at 13:14

## 2020-02-07 NOTE — CONSULT NOTE ADULT - ASSESSMENT
79 yr old female w chronic venous stasis ulcer x 3 yrs, varicose veins presetned w       #RLE cellulitis  Doppler no deep clot, +superficial thrombophlebitis distally  nonhealing ulcer  1. admit to med  2. continue IV zosyn  3. vanco 1 g q 12 hrs pending weight  4. monitor temp  5. consider ID and   6. vasc  7 ascorbic acid 500 mg po daily  8. zinc 220 mg po daily  9. lipids, TSH, Hb A1c in am  10 consider PT evaluation      IMPROVE VTE Individual Risk Assessment    RISK                                                                Points    [  ] Previous VTE                                                  3    [  ] Thrombophilia                                               2    [  ] Lower limb paralysis                                      2        (unable to hold up >15 seconds)      [  ] Current Cancer                                              2         (within 6 months)    [  ] Immobilization > 24 hrs                                1    [  ] ICU/CCU stay > 24 hours                              1    [ x ] Age > 60                                                      1    IMPROVE VTE Score _____1____    IMPROVE Score 0-1: Low Risk, No VTE prophylaxis required for most patients, encourage ambulation.   IMPROVE Score 2-3: At risk, pharmacologic VTE prophylaxis is indicated for most patients (in the absence of a contraindication)  IMPROVE Score > or = 4: High Risk, pharmacologic VTE prophylaxis is indicated for most patients (in the absence of a contraindication) 79 yr old female w chronic venous stasis ulcer x 3 yrs, varicose veins presetned w       #RLE cellulitis  Doppler no deep clot, +superficial thrombophlebitis distally  nonhealing ulcer  1. admit to med  2. continue IV zosyn  3. vanco 1 g q 12 hrs pending weight  4. monitor temp  5. consider ID and   6. vasc  7 ascorbic acid 500 mg po daily  8. zinc 220 mg po daily  9. lipids, TSH, Hb A1c in am  10 consider PT evaluation      #Pain meds  1. IVF RL at 75 cc/hr  2. morphine as ordered already  3. NSAIDS      IMPROVE VTE Individual Risk Assessment    RISK                                                                Points    [  ] Previous VTE                                                  3    [  ] Thrombophilia                                               2    [  ] Lower limb paralysis                                      2        (unable to hold up >15 seconds)      [  ] Current Cancer                                              2         (within 6 months)    [  ] Immobilization > 24 hrs                                1    [  ] ICU/CCU stay > 24 hours                              1    [ x ] Age > 60                                                      1    IMPROVE VTE Score _____1____    IMPROVE Score 0-1: Low Risk, No VTE prophylaxis required for most patients, encourage ambulation.   IMPROVE Score 2-3: At risk, pharmacologic VTE prophylaxis is indicated for most patients (in the absence of a contraindication)  IMPROVE Score > or = 4: High Risk, pharmacologic VTE prophylaxis is indicated for most patients (in the absence of a contraindication)

## 2020-02-07 NOTE — ED PROVIDER NOTE - PHYSICAL EXAMINATION
Gen: NAD  Head: NCAT  Lung: CTAB, no respiratory distress, no wheezing, rales, rhonchi  CV: normal s1/s2, rrr, no murmurs, Normal perfusion, pulses 2+ throughout  Abd: soft, NTND  MSK: No edema, no visible deformities, full range of motion in all 4 extremities  Neuro: No focal neurologic deficits  Skin: [id number] wound extending to dermis of R calf, surrounded by erythema/induration, foul-smelling fluid, no crepitus, no fluctuance  Psych: normal affect

## 2020-02-07 NOTE — ED PROVIDER NOTE - OBJECTIVE STATEMENT
80yo female PMH varicose veins, venous stasis ulcers presenting with RLE pain and wound. patient states that she has had a chronic wound to RLE for last 3 years, but noticed that several days ago the wound opened up, began to drain foul-smelling thick fluid and is now extremely painful. Denies fevers/chills. Takes no medications. No history of DM.

## 2020-02-07 NOTE — CONSULT NOTE ADULT - SUBJECTIVE AND OBJECTIVE BOX
Vascular Attending:  Dr Irving      HPI:  78yo female PMH varicose veins, venous stasis ulcers presenting with RLE pain and wound. Patient states that she has had a chronic wound to RLE for last 3 years. She was admitted in May 2019 for the same reason, was seen by ID, vascular sx & dermetology. She had a skin bx done by dermatology & path was negative for pyoderma gangrenosum. Tissue cx grew Enterobacter colacae. CATE- showing no arterial compromise at that time. She completed ancef course for cellulitis. As per pt the wound started healing until few months ago when the scab re-opened & few days ago the ulcer just got worse with began to drain foul-smelling thick fluid and is now extremely painful. Denies fevers/chills. Takes no medications. No history of DM. No chest pain, palpitations, sob, light headedness/dizziness, difficulty breathing/cough, fevers/chills, abdominal pain, n/v, diarrhea/constipation, dysuria or increased urinary frequency.       PAST MEDICAL & SURGICAL HISTORY:  Recurrent varicose veins  Venous stasis dermatitis of lower extremity  No significant past surgical history      REVIEW OF SYSTEMS-as above. All other ROS negative except for above  General:	  Skin/Breast:  Ophthalmologic:  ENMT:	  Respiratory and Thorax:  Cardiovascular:	  Gastrointestinal:	  Genitourinary:	  Musculoskeletal:	  Neurological:	  Psychiatric:	  Hematology/Lymphatics:	  Endocrine:	  Allergic/Immunologic:	    MEDICATIONS  (STANDING):  ascorbic acid 500 milliGRAM(s) Oral daily  collagenase Ointment 1 Application(s) Topical two times a day  enoxaparin Injectable 40 milliGRAM(s) SubCutaneous daily  ibuprofen  Tablet. 400 milliGRAM(s) Oral every 6 hours  lactated ringers. 1000 milliLiter(s) (75 mL/Hr) IV Continuous <Continuous>  piperacillin/tazobactam IVPB.. 3.375 Gram(s) IV Intermittent every 8 hours  zinc sulfate 220 milliGRAM(s) Oral daily    MEDICATIONS  (PRN):      Allergies  No Known Allergies    SOCIAL HISTORY: Lives with neice, limited ambulation sec to pain. Nonsmoker      Vital Signs Last 24 Hrs  T(C): 36.9 (07 Feb 2020 13:02), Max: 36.9 (07 Feb 2020 13:02)  T(F): 98.5 (07 Feb 2020 13:02), Max: 98.5 (07 Feb 2020 13:02)  HR: 84 (07 Feb 2020 13:02) (84 - 91)  BP: 155/81 (07 Feb 2020 13:02) (154/72 - 155/81)  BP(mean): --  RR: 18 (07 Feb 2020 13:02) (18 - 20)  SpO2: 99% (07 Feb 2020 13:02) (98% - 99%)    PHYSICAL EXAM:  Constitutional: Elderly F in NAD, uncomfortable sec to pain in R ankle  Neck: No JVD  Respiratory: Essentially CTA  Cardiovascular: normal S1, S2  Gastrointestinal: soft, ND, NT, + BS  Extremities: RLE with 2+ edema and erythema distal calf/ant maleolar region with a 3x3 cm ulcer with fibrinous slough and serous drainage. Chronic venous stasis changes. LLE without any sig edema. Skin intact  Neurological: A&O x 3, HARRISON x 4=    Pulses:   Right:                                                                          Left:  FEM [x ]2+ [ ]1+ [ ]doppler                                             FEM [x ]2+ [ ]1+ [ ]doppler    POP [x ]2+ [ ]1+ [ ]doppler                                             POP [x ]2+ [ ]1+ [ ]doppler    DP [ ]2+ [x ]1+ [ ]doppler                                                DP [ ]2+ [ x]1+ [ ]doppler  PT[ ]2+ [ ]1+ [ ]doppler                                                  PT [ ]2+ [ ]1+ [ ]doppler      LABS:                        10.4   4.45  )-----------( 249      ( 07 Feb 2020 10:49 )             34.1     02-07    139  |  101  |  10.0  ----------------------------<  97  4.1   |  27.0  |  0.97    Ca    10.0      07 Feb 2020 10:49    TPro  7.9  /  Alb  3.9  /  TBili  0.4  /  DBili  x   /  AST  21  /  ALT  10  /  AlkPhos  98  02-07    RADIOLOGY & ADDITIONAL STUDIES  < from: US Duplex Venous Lower Ext Ltd, Right (02.07.20 @ 12:33) >  EXAM:  US DPLX LWR EXT VEINS LTD RT                          PROCEDURE DATE:  02/07/2020          INTERPRETATION:  CLINICAL INFORMATION: Right lower extremity edema    COMPARISON: Bilateral lower extremity venous Doppler 5/17/2019    TECHNIQUE: Duplex sonography of the RIGHT LOWER extremity veins with color and spectral Doppler, with and without compression.      FINDINGS:    There is normal compressibility of the right common femoral, femoral and popliteal veins.     The contralateral common femoral vein is patent.    Doppler examination shows normal spontaneous and phasic flow.    No calf vein thrombosis is detected.    Incidental note is made of a prominent morphologically normal right groin lymph node measuring 5.1 x 0.9 x 3.3 cm.    Varicose veins are noted in the medial calf (proximal to distal), one of which is thrombosed distally. Subcutaneous edema is seen in the calf.    IMPRESSION:     No evidence of right lower extremity deep venous thrombosis.    Superficial thrombus/thrombophlebitis involving a varicose vein in the medial distal right calf.    < end of copied text >    < from: US Duplex Arterial Lower Ext Compl, Bilateral (05.19.19 @ 22:21) >   EXAM:  US DPLX LWR EXT ARTS COMPL BI                          PROCEDURE DATE:  05/19/2019          INTERPRETATION:  US LOWER EXTREMITY ARTERIAL DUPLEX COMPLETE BILATERAL    HISTORY:  PVD with right lower ext ulcer    Real-time sonography of the bilateral lower extremity arterial system was   performed using a high-resolution linear array transducer and including   color and spectral Doppler.     Mild plaque is seen within the left tibioperoneal trunk. Evaluation of   the right calf arteries was limited secondary to an open wound. No   significant plaque or narrowing is seen within the imaged lower extreme   arteries. Monophasic waveforms below the knee are likely secondary to   underlying disease.    Flow phase patterns and peak systolic velocity measurements (in cm/s)   were observed as follows:    RIGHT:  EIA: 152; triphasic  CFA:  135; triphasic   Proximal SFA: 145; triphasic   Mid SFA:137; triphasic   Distal SFA:  110;  biphasic   Popliteal:  77;  triphasic   Anterior tibial :  51;monophasic   Posterior tibial, distal: 101; monophasic   Peroneal: Not well seen  Dorsalis pedis: 42;  monophasic     LEFT:  EIA: 193; triphasic  CFA:  127; triphasic   Proximal SFA: 123; triphasic   Mid SFA:100; triphasic   Distal SFA:  81; triphasic   Popliteal:  89;  triphasic   Anterior tibial :  92; monophasic   Posterior tibial, distal: 32; monophasic  Mid: 124; monophasic  Peroneal:  59; monophasic   Dorsalis pedis:  153;  monophasic     IMPRESSION:    Limited evaluation of the right calf arteries secondary to an open wound.    No evidence of a hemodynamically significant stenosis in the imaged lower   extremity arteries bilaterally.    < end of copied text >    < from: VA Physiol Extremity Lower 3+ Level, BI (05.20.19 @ 11:37) >  EXAM:  US PHYSIOL LWR EXT 3+ LEV BI                          PROCEDURE DATE:  05/20/2019          INTERPRETATION:  History:Right leg ulcer    Technique: Bilateral CATE/PVR    Comparison: None     Findings:     RIGHT  CATE: 0.93  Flow study: Pulsatile flow from the high thigh through the great toe    LEFT  CATE: 0.96  Flow study: Pulsatile flow from the high thigh through the great toe.   Flattening of the waveforms at the ankle and the foot.      Impression:     Ankle-brachial indices show mild disease on the right, and are normal on   the left.    There is good flow to the right great toe    There is diminished flow below the knee on the left    < end of copied text >    Impression and Plan: 79 yo F with severe venous stasis disease and lower extremity varicosities, R ankle chronically recurring venous stasis ulcer with superimposed cellulitis up calf  Prior arterial studies without any hemodynamically sig PAD    -Continue antibiotics per primary team  -Leg elevation  -Local wound care with Santyl - applying daily to the ulcer base; cover with dry gauze dressings on top; gently wrap with ACE for edema  -Eventually when this cellulitis resolves, patient will need UNNA boot to right ankle   -Once the ulcer completely heals, will need long term compression stocking therapy and followup with vascular surgery  -Discussed with Dr Irving

## 2020-02-07 NOTE — H&P ADULT - NSHPPHYSICALEXAM_GEN_ALL_CORE
GENERAL: NAD, well-groomed, well-developed  HEAD:  Atraumatic, Normocephalic  EYES: EOMI, PERRLA, conjunctiva and sclera clear  NECK: Supple, No JVD  NERVOUS SYSTEM:  Alert & Oriented X3, Motor Strength 5/5 B/L upper and lower extremities; DTRs 2+ intact and symmetric  CHEST/LUNG: Clear to percussion bilaterally; No rales, rhonchi, wheezing, or rubs  HEART: Regular rate and rhythm; No murmurs, rubs, or gallops  ABDOMEN: Soft, Nontender, Nondistended; Bowel sounds present  EXTREMITIES:  weakn DP pulses b/l, RLE edema with chronic venous stasis changes, [id number] wound extending to dermis of RLE, surrounded by erythema/induration, foul-smelling fluid, no fluctuance

## 2020-02-07 NOTE — ED STATDOCS - CLINICAL SUMMARY MEDICAL DECISION MAKING FREE TEXT BOX
I, Kira Mclean, performed the initial face to face bedside interview with this patient regarding history of present illness and determined that the patient should be seen in the main ED.

## 2020-02-07 NOTE — H&P ADULT - HISTORY OF PRESENT ILLNESS
80yo female PMH varicose veins, venous stasis ulcers presenting with RLE pain and wound. Patient states that she has had a chronic wound to RLE for last 3 years. She was admitted in May 2019 for the same reason, was seen by ID, vascular sx & dermetology. She had a skin bx done by dermatology & path was positive for venous stasis ulcer. Pyoderma gangrenosum was ruled out. Tissue cx grew Enterobacter colacae. CATE- showing no arterial compromise at that time. She completed ancef course for cellulitis. As per pt the wound started healing until few months ago when the scab re-opened & few days ago the ulcer just got worse with began to drain foul-smelling thick fluid and is now extremely painful. Denies fevers/chills. Takes no medications. No history of DM. No chest pain, palpitations, sob, light headedness/dizziness, difficulty breathing/cough, fevers/chills, abdominal pain, n/v, diarrhea/constipation, dysuria or increased urinary frequency. In the ED: Pt is afebrile. Vitals stable. No leukocytosis. Received IV Vanco & Zosyn x 1 dose

## 2020-02-07 NOTE — ED PROVIDER NOTE - CLINICAL SUMMARY MEDICAL DECISION MAKING FREE TEXT BOX
80yo female with cellulitis surrounding wound to RLE, painful, with foul-smelling discharge, will require admission for IV antibiotics, wound care. Yumiko Zelaya DO

## 2020-02-07 NOTE — CONSULT NOTE ADULT - SUBJECTIVE AND OBJECTIVE BOX
79 year old female w chronic leg ulcers x 3 years came to ED c/o RLE swelling and pain      Had been hospitalized at [facility]  fomr same wound.  Improved after IV abx cefazolin and vanco  Skin bx no organisms; no bacteria, fungi or AFB.  Empirically treated w minocycline for presumptive possibility of pyoderma gangrenosum.  (Was seen by Derm Dr. Ortiz and vasc Dr. Anderson as well as by ID Dr. Ledezma)      Pt states swelling and pain started again 3 weeks ago  Denied fever or chills although she always feels cold  Denied trauma  Cleans the wound w wound wash and also cleans it in the shower  Has become so painful w constant pain 10/10  Has a recurring patch of very dry skin near it that keeps peeling  Unable to wear compression  Now w difficulty walking due to pain      in ED /72  HR 91  RR 20  T 98 98% sat RA  wound to RLE near ankle w seropurulent discharge  doppler + patent deep veins + superficial thrombophlebitis  xray R tib/fib no gs  vanco + zosyn given as well as tylenol and morphine    PAST MEDICAL HX  Recurrent varicose veins    Venous stasis dermatitis of lower extremity    PAST SURGICAL HX  Hysterectomy        SOCIAL HX  nonsmoker      T(C): 36.9 (02-07-20 @ 13:02), Max: 36.9 (02-07-20 @ 13:02)  HR: 84 (02-07-20 @ 13:02) (84 - 91)  BP: 155/81 (02-07-20 @ 13:02) (154/72 - 155/81)  RR: 18 (02-07-20 @ 13:02) (18 - 20)  SpO2: 99% (02-07-20 @ 13:02) (98% - 99%)    PHYSICAL EXAM: evaluation precludes physical exam. Pertinent physical exam findings as per video conference with  nurse at bedside is as follows:      pleasant female in distress due to pain  + large ulcer RLE anteriorly w adherent seropurulent discharge that is described as foul smelling                                10.4   4.45  )-----------( 249      ( 07 Feb 2020 10:49 )             34.1     07 Feb 2020 10:49    139    |  101    |  10.0   ----------------------------<  97     4.1     |  27.0   |  0.97     Ca    10.0       07 Feb 2020 10:49    TPro  7.9    /  Alb  3.9    /  TBili  0.4    /  DBili  x      /  AST  21     /  ALT  10     /  AlkPhos  98     07 Feb 2020 10:49      CAPILLARY BLOOD GLUCOSE        LIVER FUNCTIONS - ( 07 Feb 2020 10:49 )  Alb: 3.9 g/dL / Pro: 7.9 g/dL / ALK PHOS: 98 U/L / ALT: 10 U/L / AST: 21 U/L / GGT: x             RADIOLOGY      EXAM:  TIBIA FIBULA-RIGHT                          PROCEDURE DATE:  02/07/2020          INTERPRETATION:  Right leg    HISTORY: Distal leg wound      Two views of the right leg show no evidence of fracture nor destructive change. The joint spaces are maintained. Vascular calcifications are present.    IMPRESSION: No evidence of bony destruction.              EXAM:  US DPLX LWR EXT VEINS LTD RT                          PROCEDURE DATE:  02/07/2020          INTERPRETATION:  CLINICAL INFORMATION: Right lower extremity edema    COMPARISON: Bilateral lower extremity venous Doppler 5/17/2019    TECHNIQUE: Duplex sonography of the RIGHT LOWER extremity veins with color and spectral Doppler, with and without compression.      FINDINGS:    There is normal compressibility of the right common femoral, femoral and popliteal veins.     The contralateral common femoral vein is patent.    Doppler examination shows normal spontaneous and phasic flow.    No calf vein thrombosis is detected.    Incidental note is made of a prominent morphologically normal right groin lymph node measuring 5.1 x 0.9 x 3.3 cm.    Varicose veins are noted in the medial calf (proximal to distal), one of which is thrombosed distally. Subcutaneous edema is seen in the calf.    IMPRESSION:     No evidence of right lower extremity deep venous thrombosis.    Superficial thrombus/thrombophlebitis involving a varicose vein in the medial distal right calf.

## 2020-02-07 NOTE — H&P ADULT - ASSESSMENT
80yo female PMH varicose veins, venous stasis ulcers presenting with RLE pain and wound. Patient states that she has had a chronic wound to E for last 3 years. She was admitted in May 2019 for the same reason, was seen by ID, vascular sx & dermetology. She had a skin bx done by dermatology & path was positive for venous stasis ulcer. Pyoderma gangrenosum was ruled out. Tissue cx grew Enterobacter colacae. CATE- showing no arterial compromise at that time. She completed ancef course for cellulitis. As per pt the wound started healing until few months ago when the scab re-opened & few days ago the ulcer just got worse with began to drain foul-smelling thick fluid and is now extremely painful. Denies fevers/chills. Takes no medications. No history of DM. No chest pain, palpitations, sob, light headedness/dizziness, difficulty breathing/cough, fevers/chills, abdominal pain, n/v, diarrhea/constipation, dysuria or increased urinary frequency. In the ED: Pt is afebrile. Vitals stable. No leukocytosis. Received IV Vanco & Zosyn x 1 dose            >RLE venous stasis non healing ulcer with ?surrounding cellulitis-  Admit to any bed  Pt was given IV abx in ED but is afebrile & no leukocytosis, will hold off on further abx for now until evaluated by ID  ESR/CRP  f/u Wound cx, Blood cx  Procal  ID consult called  RLE duplex: No evidence of right lower extremity deep venous thrombosis. Superficial thrombus/thrombophlebitis involving a varicose vein in the medial distal right calf.  Warm compresses & NSAIDs for Superficial thrombus/thrombophlebitis  RLE x ray:  no evidence of fracture nor destructive change. The joint spaces are maintained. Vascular calcifications are present.  Repeat CATE/PVR with hx of smoking & non healing ulcer  Vascular sx consulted      DVT ppx 78yo female PMH varicose veins, venous stasis ulcers presenting with RLE pain and wound. Patient states that she has had a chronic wound to E for last 3 years. She was admitted in May 2019 for the same reason, was seen by ID, vascular sx & dermetology. She had a skin bx done by dermatology & path was positive for venous stasis ulcer. Pyoderma gangrenosum was ruled out. Tissue cx grew Enterobacter colacae. CATE- showing no arterial compromise at that time. She completed ancef course for cellulitis. As per pt the wound started healing until few months ago when the scab re-opened & few days ago the ulcer just got worse with began to drain foul-smelling thick fluid and is now extremely painful. Denies fevers/chills. Takes no medications. No history of DM. No chest pain, palpitations, sob, light headedness/dizziness, difficulty breathing/cough, fevers/chills, abdominal pain, n/v, diarrhea/constipation, dysuria or increased urinary frequency. In the ED: Pt is afebrile. Vitals stable. No leukocytosis. Received IV Vanco & Zosyn x 1 dose            >RLE venous stasis non healing ulcer with ?surrounding cellulitis-  Admit to any bed  Pt was given IV abx in ED but is afebrile & no leukocytosis, will hold off on further abx for now until evaluated by ID  ESR/CRP  f/u Wound cx, Blood cx  Procal  A1c (no hx of DM)  ID consult called  RLE duplex: No evidence of right lower extremity deep venous thrombosis. Superficial thrombus/thrombophlebitis involving a varicose vein in the medial distal right calf.  Warm compresses & NSAIDs for Superficial thrombus/thrombophlebitis  RLE x ray:  no evidence of fracture nor destructive change. The joint spaces are maintained. Vascular calcifications are present.  Repeat CATE/PVR with hx of smoking & non healing ulcer  Vascular sx consulted. Collagenase was recommended by vascular on last admission. Zinc & vitamin c for wound healing      DVT ppx

## 2020-02-07 NOTE — ED ADULT NURSE NOTE - OBJECTIVE STATEMENT
Received patient awake, alert, orientedx4, states worsening ulcer to her RLE, painful, appears infected.  States does not have a PMD, was last hospitalized at [place] in may 2019 for chronic cellulitis of RLE.

## 2020-02-07 NOTE — ED STATDOCS - PROGRESS NOTE DETAILS
Pt pw worsening leg ulcer  over the past month, now malodorous, draining and increasingly painful. Pt states she had the same wound which was improving after last hospitalization but never saw any doctors since then;  states her niece who she lives w/ never takes her to see the doctor. Denies fever/chills. c/o pain. RLE venous stasis ulcer malodorous; serous drainage on gauze;  (pt does her own wound care); pulses intact. skin surrounding and cranial to wound warm, erythematous; no crepitance.

## 2020-02-07 NOTE — CONSULT NOTE ADULT - ATTENDING COMMENTS
VTE  med rec no home meds        follow up  speciality recommendations
Patient evaluated at the bedside. Non invasive arterial studies reviewed. No evidence of severe PVD on RLE. DVT US also reviewed: SVT of GSV.   R dorsum of foot venous stasis ulcer is tender. Would benefit from wound care and debridement maybe grafting after infection is resolved. Please have the patient follow up with us after DC for reflux US-assesment if further venous intervention is needed given GSV is actually thrombosed. Please contact podiatry for evaluation and treatment of wound.

## 2020-02-07 NOTE — CONSULT NOTE ADULT - SUBJECTIVE AND OBJECTIVE BOX
[facility]  INFECTIOUS DISEASES AND INTERNAL MEDICINE at [place]  =======================================================  Sourav Desai MD  Diplomates American Board of Internal Medicine and Infectious Diseases  =======================================================    [id number]  RONALD ALDRICH     CC: Patient is a 79y old  Female who presents with a chief complaint of leg wound (07 Feb 2020 12:57)    HPI:  80 y/o woman with PMH of Varicose veins and chronic R lower extremity ulcer for about 3-4 years, was admitted with worsening of leg ulcer and clear discharge.   She was admitted to [facility] in may 2019 for the same reason. At that time had enterobacter growing from wound. She was started on antibiotics but left for [place] and stopped ABx. Had biopsy that didn't show any malignancy or pyoderma gangrenosum just proliferation of venous capillaries. She feels that discharge is getting worse but it is clear and has foul smell. She has a severe varicose in the same leg.  CATE showed no arterial compromise at that time.   This time also no fever or leukocytosis, Doppler negative for DVT.   ID has been called for further recommendation.     PAST MEDICAL & SURGICAL HISTORY:  Recurrent varicose veins  Venous stasis dermatitis of lower extremity  No significant past surgical history    Social Hx: stopped smoking about 37years ago, no drinking or drugs    FAMILY HISTORY: None      Allergies  No Known Allergies    Antibiotics:  piperacillin/tazobactam IVPB.. 3.375 Gram(s) IV Intermittent every 8 hours  vancomycin  IVPB 1000 milliGRAM(s) IV Intermittent every 12 hours     REVIEW OF SYSTEMS:  CONSTITUTIONAL:  No Fever or chills  HEENT:  No diplopia or blurred vision.  No sore throat or runny nose.  CARDIOVASCULAR:  No chest pain or SOB.  RESPIRATORY:  No cough, shortness of breath, PND or orthopnea.  GASTROINTESTINAL:  No nausea, vomiting or diarrhea.  GENITOURINARY:  No dysuria, frequency or urgency. No Blood in urine  MUSCULOSKELETAL:  no joint aches, no muscle pain  SKIN:  chronic leg ulcer  NEUROLOGIC:  No paresthesias, fasciculations, seizures or weakness.  PSYCHIATRIC:  No disorder of thought or mood.  ENDOCRINE:  No heat or cold intolerance, polyuria or polydipsia.  HEMATOLOGICAL:  No easy bruising or bleeding.     Physical Exam:  Vital Signs Last 24 Hrs  T(C): 36.9 (07 Feb 2020 13:02), Max: 36.9 (07 Feb 2020 13:02)  T(F): 98.5 (07 Feb 2020 13:02), Max: 98.5 (07 Feb 2020 13:02)  HR: 84 (07 Feb 2020 13:02) (84 - 91)  BP: 155/81 (07 Feb 2020 13:02) (154/72 - 155/81)  RR: 18 (07 Feb 2020 13:02) (18 - 20)  SpO2: 99% (07 Feb 2020 13:02) (98% - 99%)  GEN: NAD  HEENT: normocephalic and atraumatic. EOMI. PERRL.    NECK: Supple.  No lymphadenopathy   LUNGS: Clear to auscultation.  HEART: Regular rate and rhythm without murmur.  ABDOMEN: Soft, nontender, and nondistended.  Positive bowel sounds.    : No CVA tenderness  EXTREMITIES: R lower leg with severe varicosis, R lower leg the angle of dorsal foot and shin has a large ulcer, minimal clear discharge, no smell, mild swelling and erythema   NEUROLOGIC: grossly intact.  PSYCHIATRIC: Appropriate affect .  SKIN: as above     Labs:  02-07    139  |  101  |  10.0  ----------------------------<  97  4.1   |  27.0  |  0.97    Ca    10.0      07 Feb 2020 10:49    TPro  7.9  /  Alb  3.9  /  TBili  0.4  /  DBili  x   /  AST  21  /  ALT  10  /  AlkPhos  98  02-07                        10.4   4.45  )-----------( 249      ( 07 Feb 2020 10:49 )             34.1     LIVER FUNCTIONS - ( 07 Feb 2020 10:49 )  Alb: 3.9 g/dL / Pro: 7.9 g/dL / ALK PHOS: 98 U/L / ALT: 10 U/L / AST: 21 U/L / GGT: x             RECENT CULTURES:  Pending       All imaging and other data have been reviewed.  < from: Xray Tibia + Fibula 2 Views, Right (02.07.20 @ 10:50) >   EXAM:  TIBIA FIBULA-RIGHT                        PROCEDURE DATE:  02/07/2020    INTERPRETATION:  Right leg  HISTORY: Distal leg wound    Two views of the right leg show no evidence of fracture nor destructive change. The joint spaces are maintained. Vascular calcifications are present.  IMPRESSION: No evidence of bony destruction.    Assessment and Plan:   80 y/o woman with PMH of Varicose veins and chronic R lower extremity ulcer for about 3-4 years, was admitted with worsening of leg ulcer and clear discharge.   She was admitted to [facility] in may 2019 for the same reason. At that time had enterobacter growing from wound. She was started on antibiotics but left for [place] and stopped ABx. Had biopsy that didn't show any malignancy or pyoderma gangrenosum just proliferation of venous capillaries. Since wound is open can get 2' infection, there is mild cellulitis but the main issue is varicosis and chronic ulcer that needs wound care and vascular intervention.    Chronic leg ulcer  Varicosis     - Blood culture is pending  - Since wound is open chronically, superficial culture is not helpful, will be contaminated.   - Doppler neg for DVT  - Xray neg for bone involvement   - Vascular for work up, possible varicose vein ligation?   - Repeat CATE  - Trend WBC and Tm, both normal  - ESR and CRP pending  - Will continue Zosyn 3.375gm q8h for now  - Can stop vancomycin to prevent renal issue, since no MRSA in the past.   - Wound care     Will follow.    Case discussed with Dr. Abdul.

## 2020-02-08 LAB
ANION GAP SERPL CALC-SCNC: 12 MMOL/L — SIGNIFICANT CHANGE UP (ref 5–17)
BASOPHILS # BLD AUTO: 0.03 K/UL — SIGNIFICANT CHANGE UP (ref 0–0.2)
BASOPHILS NFR BLD AUTO: 0.6 % — SIGNIFICANT CHANGE UP (ref 0–2)
BUN SERPL-MCNC: 16 MG/DL — SIGNIFICANT CHANGE UP (ref 8–20)
CALCIUM SERPL-MCNC: 9.2 MG/DL — SIGNIFICANT CHANGE UP (ref 8.6–10.2)
CHLORIDE SERPL-SCNC: 101 MMOL/L — SIGNIFICANT CHANGE UP (ref 98–107)
CO2 SERPL-SCNC: 26 MMOL/L — SIGNIFICANT CHANGE UP (ref 22–29)
CREAT SERPL-MCNC: 1.12 MG/DL — SIGNIFICANT CHANGE UP (ref 0.5–1.3)
EOSINOPHIL # BLD AUTO: 0.15 K/UL — SIGNIFICANT CHANGE UP (ref 0–0.5)
EOSINOPHIL NFR BLD AUTO: 3 % — SIGNIFICANT CHANGE UP (ref 0–6)
FERRITIN SERPL-MCNC: 74 NG/ML — SIGNIFICANT CHANGE UP (ref 15–150)
FOLATE SERPL-MCNC: 9.7 NG/ML — SIGNIFICANT CHANGE UP
GLUCOSE SERPL-MCNC: 103 MG/DL — HIGH (ref 70–99)
HAPTOGLOB SERPL-MCNC: 156 MG/DL — SIGNIFICANT CHANGE UP (ref 34–200)
HBA1C BLD-MCNC: 5.3 % — SIGNIFICANT CHANGE UP (ref 4–5.6)
HCT VFR BLD CALC: 28.5 % — LOW (ref 34.5–45)
HCT VFR BLD CALC: 32 % — LOW (ref 34.5–45)
HGB BLD-MCNC: 8.7 G/DL — LOW (ref 11.5–15.5)
HGB BLD-MCNC: 9.7 G/DL — LOW (ref 11.5–15.5)
IMM GRANULOCYTES NFR BLD AUTO: 0.2 % — SIGNIFICANT CHANGE UP (ref 0–1.5)
IRON SATN MFR SERPL: 10 % — LOW (ref 14–50)
IRON SATN MFR SERPL: 27 UG/DL — LOW (ref 37–145)
LDH SERPL L TO P-CCNC: 130 U/L — SIGNIFICANT CHANGE UP (ref 98–192)
LYMPHOCYTES # BLD AUTO: 1.85 K/UL — SIGNIFICANT CHANGE UP (ref 1–3.3)
LYMPHOCYTES # BLD AUTO: 37.3 % — SIGNIFICANT CHANGE UP (ref 13–44)
MAGNESIUM SERPL-MCNC: 1.8 MG/DL — SIGNIFICANT CHANGE UP (ref 1.6–2.6)
MCHC RBC-ENTMCNC: 28.8 PG — SIGNIFICANT CHANGE UP (ref 27–34)
MCHC RBC-ENTMCNC: 30.5 GM/DL — LOW (ref 32–36)
MCV RBC AUTO: 94.4 FL — SIGNIFICANT CHANGE UP (ref 80–100)
MONOCYTES # BLD AUTO: 0.4 K/UL — SIGNIFICANT CHANGE UP (ref 0–0.9)
MONOCYTES NFR BLD AUTO: 8.1 % — SIGNIFICANT CHANGE UP (ref 2–14)
NEUTROPHILS # BLD AUTO: 2.52 K/UL — SIGNIFICANT CHANGE UP (ref 1.8–7.4)
NEUTROPHILS NFR BLD AUTO: 50.8 % — SIGNIFICANT CHANGE UP (ref 43–77)
OB PNL STL: NEGATIVE — SIGNIFICANT CHANGE UP
PHOSPHATE SERPL-MCNC: 3.2 MG/DL — SIGNIFICANT CHANGE UP (ref 2.4–4.7)
PLATELET # BLD AUTO: 213 K/UL — SIGNIFICANT CHANGE UP (ref 150–400)
POTASSIUM SERPL-MCNC: 4.1 MMOL/L — SIGNIFICANT CHANGE UP (ref 3.5–5.3)
POTASSIUM SERPL-SCNC: 4.1 MMOL/L — SIGNIFICANT CHANGE UP (ref 3.5–5.3)
RBC # BLD: 3.02 M/UL — LOW (ref 3.8–5.2)
RBC # BLD: 3.02 M/UL — LOW (ref 3.8–5.2)
RBC # FLD: 13.4 % — SIGNIFICANT CHANGE UP (ref 10.3–14.5)
RETICS #: 42.3 K/UL — SIGNIFICANT CHANGE UP (ref 25–125)
RETICS/RBC NFR: 1.4 % — SIGNIFICANT CHANGE UP (ref 0.5–2.5)
SODIUM SERPL-SCNC: 139 MMOL/L — SIGNIFICANT CHANGE UP (ref 135–145)
TIBC SERPL-MCNC: 277 UG/DL — SIGNIFICANT CHANGE UP (ref 220–430)
TRANSFERRIN SERPL-MCNC: 194 MG/DL — SIGNIFICANT CHANGE UP (ref 192–382)
TSH SERPL-MCNC: 1.45 UIU/ML — SIGNIFICANT CHANGE UP (ref 0.27–4.2)
VIT B12 SERPL-MCNC: 238 PG/ML — SIGNIFICANT CHANGE UP (ref 232–1245)
WBC # BLD: 4.96 K/UL — SIGNIFICANT CHANGE UP (ref 3.8–10.5)
WBC # FLD AUTO: 4.96 K/UL — SIGNIFICANT CHANGE UP (ref 3.8–10.5)

## 2020-02-08 PROCEDURE — 99232 SBSQ HOSP IP/OBS MODERATE 35: CPT

## 2020-02-08 PROCEDURE — 93923 UPR/LXTR ART STDY 3+ LVLS: CPT | Mod: 26

## 2020-02-08 RX ORDER — FERROUS SULFATE 325(65) MG
325 TABLET ORAL DAILY
Refills: 0 | Status: DISCONTINUED | OUTPATIENT
Start: 2020-02-08 | End: 2020-02-11

## 2020-02-08 RX ADMIN — Medication 400 MILLIGRAM(S): at 11:29

## 2020-02-08 RX ADMIN — ENOXAPARIN SODIUM 40 MILLIGRAM(S): 100 INJECTION SUBCUTANEOUS at 11:29

## 2020-02-08 RX ADMIN — TRAMADOL HYDROCHLORIDE 50 MILLIGRAM(S): 50 TABLET ORAL at 15:29

## 2020-02-08 RX ADMIN — Medication 400 MILLIGRAM(S): at 00:08

## 2020-02-08 RX ADMIN — Medication 500 MILLIGRAM(S): at 11:29

## 2020-02-08 RX ADMIN — PIPERACILLIN AND TAZOBACTAM 25 GRAM(S): 4; .5 INJECTION, POWDER, LYOPHILIZED, FOR SOLUTION INTRAVENOUS at 05:16

## 2020-02-08 RX ADMIN — Medication 1 APPLICATION(S): at 05:16

## 2020-02-08 RX ADMIN — SODIUM CHLORIDE 75 MILLILITER(S): 9 INJECTION, SOLUTION INTRAVENOUS at 05:22

## 2020-02-08 RX ADMIN — TRAMADOL HYDROCHLORIDE 50 MILLIGRAM(S): 50 TABLET ORAL at 21:56

## 2020-02-08 RX ADMIN — PIPERACILLIN AND TAZOBACTAM 25 GRAM(S): 4; .5 INJECTION, POWDER, LYOPHILIZED, FOR SOLUTION INTRAVENOUS at 21:48

## 2020-02-08 RX ADMIN — Medication 400 MILLIGRAM(S): at 05:16

## 2020-02-08 RX ADMIN — Medication 1 APPLICATION(S): at 17:36

## 2020-02-08 RX ADMIN — TRAMADOL HYDROCHLORIDE 50 MILLIGRAM(S): 50 TABLET ORAL at 22:56

## 2020-02-08 RX ADMIN — TRAMADOL HYDROCHLORIDE 50 MILLIGRAM(S): 50 TABLET ORAL at 14:09

## 2020-02-08 RX ADMIN — Medication 400 MILLIGRAM(S): at 06:16

## 2020-02-08 RX ADMIN — PIPERACILLIN AND TAZOBACTAM 25 GRAM(S): 4; .5 INJECTION, POWDER, LYOPHILIZED, FOR SOLUTION INTRAVENOUS at 14:02

## 2020-02-08 RX ADMIN — Medication 400 MILLIGRAM(S): at 12:30

## 2020-02-08 RX ADMIN — ZINC SULFATE TAB 220 MG (50 MG ZINC EQUIVALENT) 220 MILLIGRAM(S): 220 (50 ZN) TAB at 11:29

## 2020-02-08 NOTE — PROGRESS NOTE ADULT - SUBJECTIVE AND OBJECTIVE BOX
[facility]  INFECTIOUS DISEASES AND INTERNAL MEDICINE at [place]  =======================================================  Sourav Desai MD  Diplomates American Board of Internal Medicine and Infectious Diseases  =======================================================    [id number]  DAXIE VALDEMAR     Follow up;  leg wound and cellulitis     Less pain. NO fever. Seen by vascular.     PAST MEDICAL & SURGICAL HISTORY:  Recurrent varicose veins  Venous stasis dermatitis of lower extremity  No significant past surgical history    Social Hx: stopped smoking about 37years ago, no drinking or drugs    FAMILY HISTORY: None    Allergies  No Known Allergies    Antibiotics:  piperacillin/tazobactam IVPB.. 3.375 Gram(s) IV Intermittent every 8 hours  vancomycin  IVPB 1000 milliGRAM(s) IV Intermittent every 12 hours     REVIEW OF SYSTEMS:  CONSTITUTIONAL:  No Fever or chills  HEENT:  No diplopia or blurred vision.  No sore throat or runny nose.  CARDIOVASCULAR:  No chest pain or SOB.  RESPIRATORY:  No cough, shortness of breath, PND or orthopnea.  GASTROINTESTINAL:  No nausea, vomiting or diarrhea.  GENITOURINARY:  No dysuria, frequency or urgency. No Blood in urine  MUSCULOSKELETAL:  no joint aches, no muscle pain  SKIN:  chronic leg ulcer  NEUROLOGIC:  No paresthesias, fasciculations, seizures or weakness.  PSYCHIATRIC:  No disorder of thought or mood.  ENDOCRINE:  No heat or cold intolerance, polyuria or polydipsia.  HEMATOLOGICAL:  No easy bruising or bleeding.     Physical Exam:  Vital Signs Last 24 Hrs  T(C): 36.4 (08 Feb 2020 07:53), Max: 37.1 (07 Feb 2020 15:50)  T(F): 97.5 (08 Feb 2020 07:53), Max: 98.7 (07 Feb 2020 15:50)  HR: 73 (08 Feb 2020 07:53) (71 - 73)  BP: 116/61 (08 Feb 2020 07:53) (116/61 - 136/77)  BP(mean): --  RR: 18 (08 Feb 2020 07:53) (18 - 18)  SpO2: 98% (07 Feb 2020 18:09) (98% - 98%)  GEN: NAD  HEENT: normocephalic and atraumatic. EOMI. PERRL.    NECK: Supple.  No lymphadenopathy   LUNGS: Clear to auscultation.  HEART: Regular rate and rhythm without murmur.  ABDOMEN: Soft, nontender, and nondistended.  Positive bowel sounds.    : No CVA tenderness  EXTREMITIES: R lower leg with severe varicosis, R lower leg the angle of dorsal foot and shin has a large ulcer, minimal clear discharge, no smell, mild swelling and erythema   NEUROLOGIC: grossly intact.  PSYCHIATRIC: Appropriate affect .  SKIN: as above     Labs:  02-08    139  |  101  |  16.0  ----------------------------<  103<H>  4.1   |  26.0  |  1.12    Ca    9.2      08 Feb 2020 10:20  Phos  3.2     02-08  Mg     1.8     02-08    TPro  7.9  /  Alb  3.9  /  TBili  0.4  /  DBili  x   /  AST  21  /  ALT  10  /  AlkPhos  98  02-07                        8.7    4.96  )-----------( 213      ( 08 Feb 2020 10:20 )             28.5     LIVER FUNCTIONS - ( 07 Feb 2020 10:49 )  Alb: 3.9 g/dL / Pro: 7.9 g/dL / ALK PHOS: 98 U/L / ALT: 10 U/L / AST: 21 U/L / GGT: x           RECENT CULTURES:  Pending      All imaging and other data have been reviewed.  < from: Xray Tibia + Fibula 2 Views, Right (02.07.20 @ 10:50) >   EXAM:  TIBIA FIBULA-RIGHT                        PROCEDURE DATE:  02/07/2020    INTERPRETATION:  Right leg  HISTORY: Distal leg wound    Two views of the right leg show no evidence of fracture nor destructive change. The joint spaces are maintained. Vascular calcifications are present.  IMPRESSION: No evidence of bony destruction.    Assessment and Plan:   78 y/o woman with PMH of Varicose veins and chronic R lower extremity ulcer for about 3-4 years, was admitted with worsening of leg ulcer and clear discharge.   She was admitted to [facility] in may 2019 for the same reason. At that time had enterobacter growing from wound. She was started on antibiotics but left for [place] and stopped ABx. Had biopsy that didn't show any malignancy or pyoderma gangrenosum just proliferation of venous capillaries. Since wound is open can get 2' infection, there is mild cellulitis but the main issue is varicosis and chronic ulcer that needs wound care and vascular intervention.    Chronic leg ulcer  Varicosis     - Blood culture is pending  - Since wound is open chronically, superficial culture is not helpful, will be contaminated.   - Doppler neg for DVT  - Xray neg for bone involvement   - Vascular consult noted.   - Trend WBC and Tm, both normal  - ESR=48 and CRP=2.61  - Will continue Zosyn 3.375gm q8h   - Wound care as per vascular     Will follow.

## 2020-02-08 NOTE — PROGRESS NOTE ADULT - ASSESSMENT
80yo female PMH varicose veins, venous stasis ulcers presenting with RLE pain and wound. Patient states that she has had a chronic wound to RLE for last 3 years. She was admitted in May 2019 for the same reason, was seen by ID, vascular sx & dermetology. She had a skin bx done by dermatology & path was positive for venous stasis ulcer. Pyoderma gangrenosum was ruled out. Tissue cx grew Enterobacter colacae. CATE- showing no arterial compromise at that time. She completed ancef course for cellulitis. As per pt the wound started healing until few months ago when the scab re-opened & few days ago the ulcer just got worse with began to drain foul-smelling thick fluid and is now extremely painful. Denies fevers/chills. Takes no medications. No history of DM. No chest pain, palpitations, sob, light headedness/dizziness, difficulty breathing/cough, fevers/chills, abdominal pain, n/v, diarrhea/constipation, dysuria or increased urinary frequency. In the ED: Pt is afebrile. Vitals stable. No leukocytosis. Received IV Vanco & Zosyn x 1 dose            >RLE venous stasis non healing ulcer with ?surrounding cellulitis-   Blood culture is pending  - Wound cx done by podiatry but agree might be contaminated since wound is open chronically  - Xray neg for bone involvement   - Trend WBC and Tm, both normal  - ESR=48 and CRP=2.61  - Will continue Zosyn 3.375gm q8h   A1c 5.3  ID consult appreciated  RLE duplex: No evidence of right lower extremity deep venous thrombosis. Superficial thrombus/thrombophlebitis involving a varicose vein in the medial distal right calf.  Warm compresses & elevation for Superficial thrombus/thrombophlebitis. Hold Motrin due to slight bump in Cr  RLE x ray:  no evidence of fracture nor destructive change. The joint spaces are maintained. Vascular calcifications are present.  Repeat CATE/PVR with hx of smoking & non healing ulcer  Vascular sx appreciated. Collagenase was recommended by vascular, applying daily to the ulcer base; cover with dry gauze dressings on top; gently wrap with ACE for edema  Eventually when this cellulitis resolves, patient will need UNNA boot to right ankle   Once the ulcer completely heals, will need long term compression stocking therapy and followup   Zinc & vitamin c for wound healing  Podiatry appreciated    DVT ppx

## 2020-02-08 NOTE — PROGRESS NOTE ADULT - ASSESSMENT
Assessment: 79 yo F with severe venous stasis disease and lower extremity varicosities, R ankle chronically recurring venous stasis ulcer with superimposed cellulitis up calf. Prior arterial studies without any hemodynamically sig PAD.    Plan:  Continue antibiotics per primary team  Leg elevation  Local wound care with Santyl - applying daily to the ulcer base; cover with dry gauze dressings on top; gently wrap with ACE for edema  Eventually when this cellulitis resolves, patient will need UNNA boot to right ankle   Once the ulcer completely heals, will need long term compression stocking therapy and followup with vascular surgery

## 2020-02-08 NOTE — PROGRESS NOTE ADULT - SUBJECTIVE AND OBJECTIVE BOX
CHIEF COMPLAINT/INTERVAL HISTORY:    Patient is a 79y old  Female who presents with a chief complaint of leg wound (08 Feb 2020 14:18)      HPI:  80yo female PMH varicose veins, venous stasis ulcers presenting with RLE pain and wound. Patient states that she has had a chronic wound to RLE for last 3 years. She was admitted in May 2019 for the same reason, was seen by ID, vascular sx & dermetology. She had a skin bx done by dermatology & path was positive for venous stasis ulcer. Pyoderma gangrenosum was ruled out. Tissue cx grew Enterobacter colacae. CATE- showing no arterial compromise at that time. She completed ancef course for cellulitis. As per pt the wound started healing until few months ago when the scab re-opened & few days ago the ulcer just got worse with began to drain foul-smelling thick fluid and is now extremely painful. Denies fevers/chills. Takes no medications. No history of DM. No chest pain, palpitations, sob, light headedness/dizziness, difficulty breathing/cough, fevers/chills, abdominal pain, n/v, diarrhea/constipation, dysuria or increased urinary frequency. In the ED: Pt is afebrile. Vitals stable. No leukocytosis. Received IV Vanco & Zosyn x 1 dose (07 Feb 2020 12:57)      SUBJECTIVE & OBJECTIVE/ ROS: Pt seen and examined at bedside. Pt's leg pain & swelling improved as per her. No chest pain, palpitations, sob, light headedness/dizziness, difficulty breathing/cough, fevers/chills, abdominal pain, n/v, diarrhea/constipation, dysuria or increased urinary frequency.     ICU Vital Signs Last 24 Hrs  T(C): 36.7 (08 Feb 2020 16:05), Max: 36.7 (08 Feb 2020 16:05)  T(F): 98.1 (08 Feb 2020 16:05), Max: 98.1 (08 Feb 2020 16:05)  HR: 78 (08 Feb 2020 16:05) (73 - 78)  BP: 129/70 (08 Feb 2020 16:05) (116/61 - 131/73)  BP(mean): --  ABP: --  ABP(mean): --  RR: 18 (08 Feb 2020 16:05) (18 - 18)  SpO2: 97% (08 Feb 2020 16:05) (97% - 98%)        MEDICATIONS  (STANDING):  ascorbic acid 500 milliGRAM(s) Oral daily  collagenase Ointment 1 Application(s) Topical two times a day  enoxaparin Injectable 40 milliGRAM(s) SubCutaneous daily  ferrous    sulfate 325 milliGRAM(s) Oral daily  piperacillin/tazobactam IVPB.. 3.375 Gram(s) IV Intermittent every 8 hours  zinc sulfate 220 milliGRAM(s) Oral daily    MEDICATIONS  (PRN):  traMADol 25 milliGRAM(s) Oral every 8 hours PRN Moderate Pain (4 - 6)  traMADol 50 milliGRAM(s) Oral every 8 hours PRN Severe Pain (7 - 10)      LABS:                        8.7    4.96  )-----------( 213      ( 08 Feb 2020 10:20 )             28.5     02-08    139  |  101  |  16.0  ----------------------------<  103<H>  4.1   |  26.0  |  1.12    Ca    9.2      08 Feb 2020 10:20  Phos  3.2     02-08  Mg     1.8     02-08    TPro  7.9  /  Alb  3.9  /  TBili  0.4  /  DBili  x   /  AST  21  /  ALT  10  /  AlkPhos  98  02-07          CAPILLARY BLOOD GLUCOSE          RECENT CULTURES:      RADIOLOGY & ADDITIONAL TESTS:      PHYSICAL EXAM:    GENERAL: NAD, well-groomed, well-developed  HEAD:  Atraumatic, Normocephalic  EYES: EOMI, PERRLA, conjunctiva and sclera clear  NECK: Supple, No JVD  NERVOUS SYSTEM:  Alert & Oriented X3, Motor Strength 5/5 B/L upper and lower extremities; DTRs 2+ intact and symmetric  CHEST/LUNG: Clear to percussion bilaterally; No rales, rhonchi, wheezing, or rubs  HEART: Regular rate and rhythm; No murmurs, rubs, or gallops  ABDOMEN: Soft, Nontender, Nondistended; Bowel sounds present  EXTREMITIES:  weakn DP pulses b/l, RLE edema with chronic venous stasis changes, [id number] wound extending to dermis of RLE, surrounded by erythema/induration, foul-smelling fluid, no fluctuance

## 2020-02-08 NOTE — CONSULT NOTE ADULT - SUBJECTIVE AND OBJECTIVE BOX
S : 79y year old Female seen at bedside for right ankle ulceration.      HPI:  Patient states the wound has closed and opened up in the past. Patient states she was seen at [place] [facility] for wound care. Patient states her dressing changes were done by a nurse at home. Patient denies any fever, nausea, vomiting, SOB.    Chief Complaint : Patient is a 79y old  Female who presents with a chief complaint of leg wound (08 Feb 2020 13:37)    80yo female PMH varicose veins, venous stasis ulcers presenting with RLE pain and wound. Patient states that she has had a chronic wound to [facility] for last 3 years. She was admitted in May 2019 for the same reason, was seen by ID, vascular sx & dermetology. She had a skin bx done by dermatology & path was positive for venous stasis ulcer. Pyoderma gangrenosum was ruled out. Tissue cx grew Enterobacter colacae. CATE- showing no arterial compromise at that time. She completed ancef course for cellulitis. As per pt the wound started healing until few months ago when the scab re-opened & few days ago the ulcer just got worse with began to drain foul-smelling thick fluid and is now extremely painful. Denies fevers/chills. Takes no medications. No history of DM. No chest pain, palpitations, sob, light headedness/dizziness, difficulty breathing/cough, fevers/chills, abdominal pain, n/v, diarrhea/constipation, dysuria or increased urinary frequency. In the ED: Pt is afebrile. Vitals stable. No leukocytosis. Received IV Vanco & Zosyn x 1 dose (07 Feb 2020 12:57)      Patient admits to  (-) Fevers, (-) Chills, (-) Nausea, (-) Vomiting, (-) Shortness of Breath      PMH: Recurrent varicose veins  Venous stasis dermatitis of lower extremity    PSH:No significant past surgical history      Allergies:No Known Allergies      Labs:                          8.7    4.96  )-----------( 213      ( 08 Feb 2020 10:20 )             28.5     WBC Trend  4.96 Date (02-08 @ 10:20)  4.45 Date (02-07 @ 10:49)      Chem  02-08    139  |  101  |  16.0  ----------------------------<  103<H>  4.1   |  26.0  |  1.12    Ca    9.2      08 Feb 2020 10:20  Phos  3.2     02-08  Mg     1.8     02-08    TPro  7.9  /  Alb  3.9  /  TBili  0.4  /  DBili  x   /  AST  21  /  ALT  10  /  AlkPhos  98  02-07          T(F): 97.5 (02-08-20 @ 07:53), Max: 98.7 (02-07-20 @ 15:50)  HR: 73 (02-08-20 @ 07:53) (71 - 73)  BP: 116/61 (02-08-20 @ 07:53) (116/61 - 136/77)  RR: 18 (02-08-20 @ 07:53) (18 - 18)  SpO2: 98% (02-07-20 @ 18:09) (98% - 98%)  Wt(kg): --    O:   General: Pleasant  female NAD & AOX3.    Integument:  Skin warm, dry and supple bilateral.    Ulceration right anterior ankle, - hyperkeratotic border, wound base Fibrogranular , wound size (5.5 cm X 5.0 cm X 0.2cm) + edema, + john-wound erythema, - purulence, - fluctuance, - tracking/tunneling, - probe to bone.   Vascular: Dorsalis Pedis and Posterior Tibial pulses 1/4.  Capillary re-fill time less then 3 seconds digits 1-5 bilateral.    Neuro: Protective sensation intact to the level of the digits bilateral.  MSK: Muscle strength 5/5 all major muscle groups bilateral.    A: Right ankle ulceration      P:   Chart reviewed and Patient evaluated  Discussed diagnosis and treatment with patient  Wound flush with normal saline  Obtained wound culture to be sent to lab  Applied santyl with dry sterile dressing  Continue with IV antibiotics As Per ID  Weight bearing as tolerated to the right foot  Offloading to bilateral Heels.   Podiatry will provide local wound care while in house.  Discussed with Dr. Aleman

## 2020-02-08 NOTE — PROGRESS NOTE ADULT - SUBJECTIVE AND OBJECTIVE BOX
Subjective: Patient was seen and examined. Resting comfortably in bed. Dressing is c/d/i with ACE wrap over for edema. Denies fever, chills, HA, chest pain, SOB, abdominal pain, N/V.        MEDICATIONS  (STANDING):  ascorbic acid 500 milliGRAM(s) Oral daily  collagenase Ointment 1 Application(s) Topical two times a day  enoxaparin Injectable 40 milliGRAM(s) SubCutaneous daily  ibuprofen  Tablet. 400 milliGRAM(s) Oral every 6 hours  lactated ringers. 1000 milliLiter(s) (75 mL/Hr) IV Continuous <Continuous>  piperacillin/tazobactam IVPB.. 3.375 Gram(s) IV Intermittent every 8 hours  zinc sulfate 220 milliGRAM(s) Oral daily    MEDICATIONS  (PRN):  traMADol 25 milliGRAM(s) Oral every 8 hours PRN Moderate Pain (4 - 6)  traMADol 50 milliGRAM(s) Oral every 8 hours PRN Severe Pain (7 - 10)      Vital Signs Last 24 Hrs  T(C): 36.6 (07 Feb 2020 18:09), Max: 37.1 (07 Feb 2020 15:50)  T(F): 97.8 (07 Feb 2020 18:09), Max: 98.7 (07 Feb 2020 15:50)  HR: 73 (07 Feb 2020 18:09) (71 - 91)  BP: 131/73 (07 Feb 2020 18:09) (131/73 - 155/81)  BP(mean): --  RR: 18 (07 Feb 2020 18:09) (18 - 20)  SpO2: 98% (07 Feb 2020 18:09) (98% - 99%)      02-07 - 02-08  --------------------------------------------------------  IN:    lactated ringers.: 375 mL  Total IN: 375 mL    OUT:  Total OUT: 0 mL    Total NET: 375 mL    Physical Exam:    Constitutional: Elderly F in NAD, uncomfortable sec to pain in R ankle  Neck: No JVD  Respiratory: CTA bilaterally  Cardiovascular: normal S1, S2  Gastrointestinal: soft, ND, NT, + BS  Extremities: RLE with 2+ edema and erythema distal calf/ant malleolar region with a 3x3 cm ulcer with fibrinous slough and serous drainage. Chronic venous stasis changes. LLE without any sig edema. Skin intact. Dressing c/d/i with ACE wrap over.  Neurological: A&O x 3      LABS:                        10.4   4.45  )-----------( 249      ( 07 Feb 2020 10:49 )             34.1     02-07    139  |  101  |  10.0  ----------------------------<  97  4.1   |  27.0  |  0.97    Ca    10.0      07 Feb 2020 10:49    TPro  7.9  /  Alb  3.9  /  TBili  0.4  /  DBili  x   /  AST  21  /  ALT  10  /  AlkPhos  98  02-07

## 2020-02-09 ENCOUNTER — TRANSCRIPTION ENCOUNTER (OUTPATIENT)
Age: 80
End: 2020-02-09

## 2020-02-09 LAB
ANION GAP SERPL CALC-SCNC: 14 MMOL/L — SIGNIFICANT CHANGE UP (ref 5–17)
BASOPHILS # BLD AUTO: 0.04 K/UL — SIGNIFICANT CHANGE UP (ref 0–0.2)
BASOPHILS NFR BLD AUTO: 1.1 % — SIGNIFICANT CHANGE UP (ref 0–2)
BUN SERPL-MCNC: 21 MG/DL — HIGH (ref 8–20)
CALCIUM SERPL-MCNC: 9.4 MG/DL — SIGNIFICANT CHANGE UP (ref 8.6–10.2)
CHLORIDE SERPL-SCNC: 100 MMOL/L — SIGNIFICANT CHANGE UP (ref 98–107)
CO2 SERPL-SCNC: 24 MMOL/L — SIGNIFICANT CHANGE UP (ref 22–29)
CREAT SERPL-MCNC: 1.15 MG/DL — SIGNIFICANT CHANGE UP (ref 0.5–1.3)
EOSINOPHIL # BLD AUTO: 0.11 K/UL — SIGNIFICANT CHANGE UP (ref 0–0.5)
EOSINOPHIL NFR BLD AUTO: 2.9 % — SIGNIFICANT CHANGE UP (ref 0–6)
GLUCOSE SERPL-MCNC: 85 MG/DL — SIGNIFICANT CHANGE UP (ref 70–99)
HCT VFR BLD CALC: 33.3 % — LOW (ref 34.5–45)
HGB BLD-MCNC: 10.1 G/DL — LOW (ref 11.5–15.5)
IMM GRANULOCYTES NFR BLD AUTO: 0.3 % — SIGNIFICANT CHANGE UP (ref 0–1.5)
LYMPHOCYTES # BLD AUTO: 1.59 K/UL — SIGNIFICANT CHANGE UP (ref 1–3.3)
LYMPHOCYTES # BLD AUTO: 42.6 % — SIGNIFICANT CHANGE UP (ref 13–44)
MAGNESIUM SERPL-MCNC: 1.9 MG/DL — SIGNIFICANT CHANGE UP (ref 1.6–2.6)
MCHC RBC-ENTMCNC: 27.7 PG — SIGNIFICANT CHANGE UP (ref 27–34)
MCHC RBC-ENTMCNC: 30.3 GM/DL — LOW (ref 32–36)
MCV RBC AUTO: 91.2 FL — SIGNIFICANT CHANGE UP (ref 80–100)
MONOCYTES # BLD AUTO: 0.4 K/UL — SIGNIFICANT CHANGE UP (ref 0–0.9)
MONOCYTES NFR BLD AUTO: 10.7 % — SIGNIFICANT CHANGE UP (ref 2–14)
NEUTROPHILS # BLD AUTO: 1.58 K/UL — LOW (ref 1.8–7.4)
NEUTROPHILS NFR BLD AUTO: 42.4 % — LOW (ref 43–77)
PHOSPHATE SERPL-MCNC: 3.8 MG/DL — SIGNIFICANT CHANGE UP (ref 2.4–4.7)
PLATELET # BLD AUTO: 245 K/UL — SIGNIFICANT CHANGE UP (ref 150–400)
POTASSIUM SERPL-MCNC: 4.2 MMOL/L — SIGNIFICANT CHANGE UP (ref 3.5–5.3)
POTASSIUM SERPL-SCNC: 4.2 MMOL/L — SIGNIFICANT CHANGE UP (ref 3.5–5.3)
RBC # BLD: 3.65 M/UL — LOW (ref 3.8–5.2)
RBC # FLD: 13.3 % — SIGNIFICANT CHANGE UP (ref 10.3–14.5)
SODIUM SERPL-SCNC: 138 MMOL/L — SIGNIFICANT CHANGE UP (ref 135–145)
WBC # BLD: 3.73 K/UL — LOW (ref 3.8–10.5)
WBC # FLD AUTO: 3.73 K/UL — LOW (ref 3.8–10.5)

## 2020-02-09 PROCEDURE — 99232 SBSQ HOSP IP/OBS MODERATE 35: CPT

## 2020-02-09 RX ORDER — SACCHAROMYCES BOULARDII 250 MG
250 POWDER IN PACKET (EA) ORAL
Refills: 0 | Status: DISCONTINUED | OUTPATIENT
Start: 2020-02-09 | End: 2020-02-11

## 2020-02-09 RX ADMIN — TRAMADOL HYDROCHLORIDE 25 MILLIGRAM(S): 50 TABLET ORAL at 21:21

## 2020-02-09 RX ADMIN — Medication 500 MILLIGRAM(S): at 11:26

## 2020-02-09 RX ADMIN — PIPERACILLIN AND TAZOBACTAM 25 GRAM(S): 4; .5 INJECTION, POWDER, LYOPHILIZED, FOR SOLUTION INTRAVENOUS at 14:05

## 2020-02-09 RX ADMIN — PIPERACILLIN AND TAZOBACTAM 25 GRAM(S): 4; .5 INJECTION, POWDER, LYOPHILIZED, FOR SOLUTION INTRAVENOUS at 05:05

## 2020-02-09 RX ADMIN — ZINC SULFATE TAB 220 MG (50 MG ZINC EQUIVALENT) 220 MILLIGRAM(S): 220 (50 ZN) TAB at 11:27

## 2020-02-09 RX ADMIN — TRAMADOL HYDROCHLORIDE 25 MILLIGRAM(S): 50 TABLET ORAL at 21:50

## 2020-02-09 RX ADMIN — Medication 1 APPLICATION(S): at 17:35

## 2020-02-09 RX ADMIN — PIPERACILLIN AND TAZOBACTAM 25 GRAM(S): 4; .5 INJECTION, POWDER, LYOPHILIZED, FOR SOLUTION INTRAVENOUS at 21:17

## 2020-02-09 RX ADMIN — Medication 1 APPLICATION(S): at 05:05

## 2020-02-09 RX ADMIN — ENOXAPARIN SODIUM 40 MILLIGRAM(S): 100 INJECTION SUBCUTANEOUS at 11:26

## 2020-02-09 RX ADMIN — Medication 325 MILLIGRAM(S): at 11:28

## 2020-02-09 NOTE — PROGRESS NOTE ADULT - SUBJECTIVE AND OBJECTIVE BOX
HPI/OVERNIGHT EVENTS:  No acute overnight events. Vital signs stable. Podiatry consulted who offered appreciated recommendations and will provide local wound care. CATE performed yesterday pending final read. Continued pain at right ankle with manipulation. Denies nausea, vomiting, fever, chills, chest pain, shortness of breath, or any new or concerning symptoms.     MEDICATIONS  (STANDING):  ascorbic acid 500 milliGRAM(s) Oral daily  collagenase Ointment 1 Application(s) Topical two times a day  enoxaparin Injectable 40 milliGRAM(s) SubCutaneous daily  ferrous    sulfate 325 milliGRAM(s) Oral daily  piperacillin/tazobactam IVPB.. 3.375 Gram(s) IV Intermittent every 8 hours  zinc sulfate 220 milliGRAM(s) Oral daily    MEDICATIONS  (PRN):  traMADol 25 milliGRAM(s) Oral every 8 hours PRN Moderate Pain (4 - 6)  traMADol 50 milliGRAM(s) Oral every 8 hours PRN Severe Pain (7 - 10)      Vital Signs Last 24 Hrs  T(C): 36.7 (09 Feb 2020 08:00), Max: 36.7 (08 Feb 2020 16:05)  T(F): 98.1 (09 Feb 2020 08:00), Max: 98.1 (08 Feb 2020 16:05)  HR: 76 (09 Feb 2020 08:00) (76 - 78)  BP: 120/69 (09 Feb 2020 08:00) (117/65 - 129/70)  BP(mean): --  RR: 19 (09 Feb 2020 08:00) (18 - 19)  SpO2: 97% (08 Feb 2020 23:43) (97% - 97%)    Constitutional: patient resting comfortably in bed, in no acute distress  HEENT: EOMI, no active drainage or redness  Neck: Full ROM without pain  Respiratory: respirations are unlabored, no accessory muscle use, no conversational dyspnea  Cardiovascular: regular rate & rhythm  Gastrointestinal: Abdomen soft, non-tender, non-distended  Neurological: A&O x 3; no gross sensory / motor / coordination deficits  Ext: RLE edema and erythema, distal calf/ant malleolar region with a 3x3 cm ulcer with fibrinous slough and serous drainage. Chronic venous stasis changes appreciated. Skin intact. Dressings clean, dry, and intact.     I&O's Detail    08 Feb 2020 07:01  -  09 Feb 2020 07:00  --------------------------------------------------------  IN:    Solution: 100 mL  Total IN: 100 mL    OUT:  Total OUT: 0 mL    Total NET: 100 mL          LABS:                        9.7    x     )-----------( x        ( 08 Feb 2020 17:37 )             32.0     02-08    139  |  101  |  16.0  ----------------------------<  103<H>  4.1   |  26.0  |  1.12    Ca    9.2      08 Feb 2020 10:20  Phos  3.2     02-08  Mg     1.8     02-08    TPro  7.9  /  Alb  3.9  /  TBili  0.4  /  DBili  x   /  AST  21  /  ALT  10  /  AlkPhos  98  02-07

## 2020-02-09 NOTE — PROGRESS NOTE ADULT - SUBJECTIVE AND OBJECTIVE BOX
CHIEF COMPLAINT/INTERVAL HISTORY:    Patient is a 79y old  Female who presents with a chief complaint of leg wound (08 Feb 2020 14:18)      HPI:  80yo female PMH varicose veins, venous stasis ulcers presenting with RLE pain and wound. Patient states that she has had a chronic wound to RLE for last 3 years. She was admitted in May 2019 for the same reason, was seen by ID, vascular sx & dermetology. She had a skin bx done by dermatology & path was positive for venous stasis ulcer. Pyoderma gangrenosum was ruled out. Tissue cx grew Enterobacter colacae. CATE- showing no arterial compromise at that time. She completed ancef course for cellulitis. As per pt the wound started healing until few months ago when the scab re-opened & few days ago the ulcer just got worse with began to drain foul-smelling thick fluid and is now extremely painful. Denies fevers/chills. Takes no medications. No history of DM. No chest pain, palpitations, sob, light headedness/dizziness, difficulty breathing/cough, fevers/chills, abdominal pain, n/v, diarrhea/constipation, dysuria or increased urinary frequency. In the ED: Pt is afebrile. Vitals stable. No leukocytosis. Received IV Vanco & Zosyn x 1 dose (07 Feb 2020 12:57)      SUBJECTIVE & OBJECTIVE/ ROS: Pt seen and examined at bedside. Pt's leg pain & swelling feels better. c/o mild diarrhea. No chest pain, palpitations, sob, light headedness/dizziness, difficulty breathing/cough, fevers/chills, abdominal pain, n/v, constipation, dysuria or increased urinary frequency.     ICU Vital Signs Last 24 Hrs  T(C): 36.7 (08 Feb 2020 16:05), Max: 36.7 (08 Feb 2020 16:05)  T(F): 98.1 (08 Feb 2020 16:05), Max: 98.1 (08 Feb 2020 16:05)  HR: 78 (08 Feb 2020 16:05) (73 - 78)  BP: 129/70 (08 Feb 2020 16:05) (116/61 - 131/73)  BP(mean): --  ABP: --  ABP(mean): --  RR: 18 (08 Feb 2020 16:05) (18 - 18)  SpO2: 97% (08 Feb 2020 16:05) (97% - 98%)        MEDICATIONS  (STANDING):  ascorbic acid 500 milliGRAM(s) Oral daily  collagenase Ointment 1 Application(s) Topical two times a day  enoxaparin Injectable 40 milliGRAM(s) SubCutaneous daily  ferrous    sulfate 325 milliGRAM(s) Oral daily  piperacillin/tazobactam IVPB.. 3.375 Gram(s) IV Intermittent every 8 hours  zinc sulfate 220 milliGRAM(s) Oral daily    MEDICATIONS  (PRN):  traMADol 25 milliGRAM(s) Oral every 8 hours PRN Moderate Pain (4 - 6)  traMADol 50 milliGRAM(s) Oral every 8 hours PRN Severe Pain (7 - 10)      LABS:                        8.7    4.96  )-----------( 213      ( 08 Feb 2020 10:20 )             28.5     02-08    139  |  101  |  16.0  ----------------------------<  103<H>  4.1   |  26.0  |  1.12    Ca    9.2      08 Feb 2020 10:20  Phos  3.2     02-08  Mg     1.8     02-08    TPro  7.9  /  Alb  3.9  /  TBili  0.4  /  DBili  x   /  AST  21  /  ALT  10  /  AlkPhos  98  02-07          CAPILLARY BLOOD GLUCOSE          RECENT CULTURES:      RADIOLOGY & ADDITIONAL TESTS:      PHYSICAL EXAM:    GENERAL: NAD, well-groomed, well-developed  HEAD:  Atraumatic, Normocephalic  EYES: EOMI, PERRLA, conjunctiva and sclera clear  NECK: Supple, No JVD  NERVOUS SYSTEM:  Alert & Oriented X3, Motor Strength 5/5 B/L upper and lower extremities; DTRs 2+ intact and symmetric  CHEST/LUNG: Clear to percussion bilaterally; No rales, rhonchi, wheezing, or rubs  HEART: Regular rate and rhythm; No murmurs, rubs, or gallops  ABDOMEN: Soft, Nontender, Nondistended; Bowel sounds present  EXTREMITIES:  weakn DP pulses b/l, RLE edema with chronic venous stasis changes, venous stasis ulcer in dressing c/d/i

## 2020-02-09 NOTE — PROGRESS NOTE ADULT - ASSESSMENT
78yo female PMH varicose veins, venous stasis ulcers presenting with RLE pain and wound. Patient states that she has had a chronic wound to RLE for last 3 years. She was admitted in May 2019 for the same reason, was seen by ID, vascular sx & dermetology. She had a skin bx done by dermatology & path was positive for venous stasis ulcer. Pyoderma gangrenosum was ruled out. Tissue cx grew Enterobacter colacae. CATE- showing no arterial compromise at that time. She completed ancef course for cellulitis. As per pt the wound started healing until few months ago when the scab re-opened & few days ago the ulcer just got worse with began to drain foul-smelling thick fluid and is now extremely painful. Denies fevers/chills. Takes no medications. No history of DM. No chest pain, palpitations, sob, light headedness/dizziness, difficulty breathing/cough, fevers/chills, abdominal pain, n/v, diarrhea/constipation, dysuria or increased urinary frequency. In the ED: Pt is afebrile. Vitals stable. No leukocytosis. Received IV Vanco & Zosyn x 1 dose            >RLE venous stasis non healing ulcer with ?surrounding cellulitis-   Blood culture is pending  - Wound cx done by podiatry but agree might be contaminated since wound is open chronically  - Xray neg for bone involvement   - Trend WBC and Tm, both normal  - ESR=48 and CRP=2.61  - Will continue Zosyn 3.375gm q8h   A1c 5.3  ID consult appreciated  RLE duplex: No evidence of right lower extremity deep venous thrombosis. Superficial thrombus/thrombophlebitis involving a varicose vein in the medial distal right calf.  Warm compresses & elevation for Superficial thrombus/thrombophlebitis. Hold Motrin due to slight bump in Cr  RLE x ray:  no evidence of fracture nor destructive change. The joint spaces are maintained. Vascular calcifications are present.  Repeat CATE/PVR with hx of smoking & non healing ulcer  Vascular sx appreciated. Collagenase was recommended by vascular, applying daily to the ulcer base; cover with dry gauze dressings on top; gently wrap with ACE for edema  Eventually when this cellulitis resolves, patient will need UNNA boot to right ankle   Once the ulcer completely heals, will need long term compression stocking therapy and followup   Zinc & vitamin c for wound healing  Podiatry appreciated    Diarrhea -likely abx associated. Florastor added. No abd pain, N/V. VSS    DVT ppx

## 2020-02-09 NOTE — DISCHARGE NOTE NURSING/CASE MANAGEMENT/SOCIAL WORK - PATIENT PORTAL LINK FT
You can access the FollowMyHealth Patient Portal offered by [facility] by registering at the following website: [email or web address]. By joining [employer] [facility]’s FollowMyHealth portal, you will also be able to view your health information using other applications (apps) compatible with our system.

## 2020-02-09 NOTE — PROGRESS NOTE ADULT - SUBJECTIVE AND OBJECTIVE BOX
[facility]  INFECTIOUS DISEASES AND INTERNAL MEDICINE at [place]  =======================================================  Sourav Desai MD  Diplomates American Board of Internal Medicine and Infectious Diseases  =======================================================    [id number]  DAXIE CROOKSON     Follow up;  leg wound and cellulitis     Less pain.   No fever.   No diarrhea     PAST MEDICAL & SURGICAL HISTORY:  Recurrent varicose veins  Venous stasis dermatitis of lower extremity  No significant past surgical history    Social Hx: stopped smoking about 37years ago, no drinking or drugs    FAMILY HISTORY: None    Allergies  No Known Allergies    Antibiotics:  piperacillin/tazobactam IVPB.. 3.375 Gram(s) IV Intermittent every 8 hours     REVIEW OF SYSTEMS:  CONSTITUTIONAL:  No Fever or chills  HEENT:  No diplopia or blurred vision.  No sore throat or runny nose.  CARDIOVASCULAR:  No chest pain or SOB.  RESPIRATORY:  No cough, shortness of breath, PND or orthopnea.  GASTROINTESTINAL:  No nausea, vomiting or diarrhea.  GENITOURINARY:  No dysuria, frequency or urgency. No Blood in urine  MUSCULOSKELETAL:  no joint aches, no muscle pain  SKIN:  chronic leg ulcer  NEUROLOGIC:  No paresthesias, fasciculations, seizures or weakness.  PSYCHIATRIC:  No disorder of thought or mood.  ENDOCRINE:  No heat or cold intolerance, polyuria or polydipsia.  HEMATOLOGICAL:  No easy bruising or bleeding.     Physical Exam:  Vital Signs Last 24 Hrs  T(C): 36.7 (09 Feb 2020 08:00), Max: 36.7 (08 Feb 2020 16:05)  T(F): 98.1 (09 Feb 2020 08:00), Max: 98.1 (08 Feb 2020 16:05)  HR: 76 (09 Feb 2020 08:00) (76 - 78)  BP: 120/69 (09 Feb 2020 08:00) (117/65 - 129/70)  BP(mean): --  RR: 19 (09 Feb 2020 08:00) (18 - 19)  SpO2: 97% (08 Feb 2020 23:43) (97% - 97%)  GEN: NAD  HEENT: normocephalic and atraumatic. EOMI. PERRL.    NECK: Supple.  No lymphadenopathy   LUNGS: Clear to auscultation.  HEART: Regular rate and rhythm without murmur.  ABDOMEN: Soft, nontender, and nondistended.  Positive bowel sounds.    : No CVA tenderness  EXTREMITIES: R lower leg with severe varicosis, R lower leg the angle of dorsal foot and shin has a large ulcer, minimal clear discharge, no smell, mild swelling and erythema   NEUROLOGIC: grossly intact.  PSYCHIATRIC: Appropriate affect .  SKIN: as above     Labs:  02-08    139  |  101  |  16.0  ----------------------------<  103<H>  4.1   |  26.0  |  1.12    Ca    9.2      08 Feb 2020 10:20  Phos  3.2     02-08  Mg     1.8     02-08    TPro  7.9  /  Alb  3.9  /  TBili  0.4  /  DBili  x   /  AST  21  /  ALT  10  /  AlkPhos  98  02-07                   9.7    x     )-----------( x        ( 08 Feb 2020 17:37 )             32.0    LIVER FUNCTIONS - ( 07 Feb 2020 10:49 )  Alb: 3.9 g/dL / Pro: 7.9 g/dL / ALK PHOS: 98 U/L / ALT: 10 U/L / AST: 21 U/L / GGT: x           RECENT CULTURES:  pending      All imaging and other data have been reviewed.  < from: Xray Tibia + Fibula 2 Views, Right (02.07.20 @ 10:50) >   EXAM:  TIBIA FIBULA-RIGHT                        PROCEDURE DATE:  02/07/2020    INTERPRETATION:  Right leg  HISTORY: Distal leg wound    Two views of the right leg show no evidence of fracture nor destructive change. The joint spaces are maintained. Vascular calcifications are present.  IMPRESSION: No evidence of bony destruction.    Assessment and Plan:   80 y/o woman with PMH of Varicose veins and chronic R lower extremity ulcer for about 3-4 years, was admitted with worsening of leg ulcer and clear discharge.   She was admitted to [facility] in may 2019 for the same reason. At that time had enterobacter growing from wound. She was started on antibiotics but left for [place] and stopped ABx. Had biopsy that didn't show any malignancy or pyoderma gangrenosum just proliferation of venous capillaries. Since wound is open can get 2' infection, there is mild cellulitis but the main issue is varicosis and chronic ulcer that needs wound care and vascular intervention.    Chronic leg ulcer  Varicosis     - Blood culture NGTD  - Since wound is open chronically, superficial culture is not helpful, will be contaminated.   - Doppler neg for DVT  - Xray neg for bone involvement   - Vascular is following for wound care  - Trend WBC and Tm, both normal  - ESR=48 and CRP=2.61  - Will continue Zosyn 3.375gm q8h   - Wound care as per vascular     Will follow.

## 2020-02-09 NOTE — DISCHARGE NOTE NURSING/CASE MANAGEMENT/SOCIAL WORK - NSDCFUADDAPPT_GEN_ALL_CORE_FT
You Have an appointment at the   On Friday 2/14/2020 at 10:45 AM with Madalyn Lind.  Address:   [address] [place] [address]  Phone # [phone]

## 2020-02-09 NOTE — PROGRESS NOTE ADULT - ASSESSMENT
Assessment: 79 yo F with severe venous stasis disease and lower extremity varicosities, R ankle chronically recurring venous stasis ulcer with superimposed cellulitis up calf. Prior arterial studies without any hemodynamically sig PAD. CATE performed and pending final interpretation.     - Leg elevation  - ABX per ID  - Local wound care per podiatry  - Eventually when this cellulitis resolves, patient will need UNNA boot to right ankle   - Once the ulcer completely heals, will need long term compression stocking therapy   - No vascular intervention at this time- will need b/l LE US reflux studies and outpatient followup with vascular surgery  - Rest of care per primary team

## 2020-02-10 ENCOUNTER — TRANSCRIPTION ENCOUNTER (OUTPATIENT)
Age: 80
End: 2020-02-10

## 2020-02-10 LAB
-  AMIKACIN: SIGNIFICANT CHANGE UP
-  AZTREONAM: SIGNIFICANT CHANGE UP
-  CEFEPIME: SIGNIFICANT CHANGE UP
-  CEFTAZIDIME: SIGNIFICANT CHANGE UP
-  CIPROFLOXACIN: SIGNIFICANT CHANGE UP
-  GENTAMICIN: SIGNIFICANT CHANGE UP
-  IMIPENEM: SIGNIFICANT CHANGE UP
-  LEVOFLOXACIN: SIGNIFICANT CHANGE UP
-  MEROPENEM: SIGNIFICANT CHANGE UP
-  PIPERACILLIN/TAZOBACTAM: SIGNIFICANT CHANGE UP
-  TOBRAMYCIN: SIGNIFICANT CHANGE UP
ANION GAP SERPL CALC-SCNC: 14 MMOL/L — SIGNIFICANT CHANGE UP (ref 5–17)
ANISOCYTOSIS BLD QL: SLIGHT — SIGNIFICANT CHANGE UP
BASOPHILS # BLD AUTO: 0 K/UL — SIGNIFICANT CHANGE UP (ref 0–0.2)
BASOPHILS NFR BLD AUTO: 0 % — SIGNIFICANT CHANGE UP (ref 0–2)
BUN SERPL-MCNC: 21 MG/DL — HIGH (ref 8–20)
CALCIUM SERPL-MCNC: 9.7 MG/DL — SIGNIFICANT CHANGE UP (ref 8.6–10.2)
CHLORIDE SERPL-SCNC: 101 MMOL/L — SIGNIFICANT CHANGE UP (ref 98–107)
CO2 SERPL-SCNC: 24 MMOL/L — SIGNIFICANT CHANGE UP (ref 22–29)
CREAT SERPL-MCNC: 1.18 MG/DL — SIGNIFICANT CHANGE UP (ref 0.5–1.3)
CULTURE RESULTS: SIGNIFICANT CHANGE UP
EOSINOPHIL # BLD AUTO: 0.06 K/UL — SIGNIFICANT CHANGE UP (ref 0–0.5)
EOSINOPHIL NFR BLD AUTO: 1.7 % — SIGNIFICANT CHANGE UP (ref 0–6)
GIANT PLATELETS BLD QL SMEAR: PRESENT — SIGNIFICANT CHANGE UP
GLUCOSE SERPL-MCNC: 86 MG/DL — SIGNIFICANT CHANGE UP (ref 70–99)
HCT VFR BLD CALC: 34.6 % — SIGNIFICANT CHANGE UP (ref 34.5–45)
HGB BLD-MCNC: 10.5 G/DL — LOW (ref 11.5–15.5)
HYPOCHROMIA BLD QL: SLIGHT — SIGNIFICANT CHANGE UP
LYMPHOCYTES # BLD AUTO: 2.08 K/UL — SIGNIFICANT CHANGE UP (ref 1–3.3)
LYMPHOCYTES # BLD AUTO: 55.3 % — HIGH (ref 13–44)
MACROCYTES BLD QL: SLIGHT — SIGNIFICANT CHANGE UP
MAGNESIUM SERPL-MCNC: 2.1 MG/DL — SIGNIFICANT CHANGE UP (ref 1.6–2.6)
MANUAL SMEAR VERIFICATION: SIGNIFICANT CHANGE UP
MCHC RBC-ENTMCNC: 28.2 PG — SIGNIFICANT CHANGE UP (ref 27–34)
MCHC RBC-ENTMCNC: 30.3 GM/DL — LOW (ref 32–36)
MCV RBC AUTO: 93 FL — SIGNIFICANT CHANGE UP (ref 80–100)
METHOD TYPE: SIGNIFICANT CHANGE UP
MICROCYTES BLD QL: SLIGHT — SIGNIFICANT CHANGE UP
MONOCYTES # BLD AUTO: 0.2 K/UL — SIGNIFICANT CHANGE UP (ref 0–0.9)
MONOCYTES NFR BLD AUTO: 5.3 % — SIGNIFICANT CHANGE UP (ref 2–14)
NEUTROPHILS # BLD AUTO: 1.39 K/UL — LOW (ref 1.8–7.4)
NEUTROPHILS NFR BLD AUTO: 36.8 % — LOW (ref 43–77)
ORGANISM # SPEC MICROSCOPIC CNT: SIGNIFICANT CHANGE UP
ORGANISM # SPEC MICROSCOPIC CNT: SIGNIFICANT CHANGE UP
OVALOCYTES BLD QL SMEAR: SLIGHT — SIGNIFICANT CHANGE UP
PHOSPHATE SERPL-MCNC: 4 MG/DL — SIGNIFICANT CHANGE UP (ref 2.4–4.7)
PLAT MORPH BLD: NORMAL — SIGNIFICANT CHANGE UP
PLATELET # BLD AUTO: 271 K/UL — SIGNIFICANT CHANGE UP (ref 150–400)
POIKILOCYTOSIS BLD QL AUTO: SLIGHT — SIGNIFICANT CHANGE UP
POLYCHROMASIA BLD QL SMEAR: SLIGHT — SIGNIFICANT CHANGE UP
POTASSIUM SERPL-MCNC: 4.1 MMOL/L — SIGNIFICANT CHANGE UP (ref 3.5–5.3)
POTASSIUM SERPL-SCNC: 4.1 MMOL/L — SIGNIFICANT CHANGE UP (ref 3.5–5.3)
RBC # BLD: 3.72 M/UL — LOW (ref 3.8–5.2)
RBC # FLD: 13.3 % — SIGNIFICANT CHANGE UP (ref 10.3–14.5)
RBC BLD AUTO: ABNORMAL
SODIUM SERPL-SCNC: 139 MMOL/L — SIGNIFICANT CHANGE UP (ref 135–145)
SPECIMEN SOURCE: SIGNIFICANT CHANGE UP
VARIANT LYMPHS # BLD: 0.9 % — SIGNIFICANT CHANGE UP (ref 0–6)
WBC # BLD: 3.77 K/UL — LOW (ref 3.8–10.5)
WBC # FLD AUTO: 3.77 K/UL — LOW (ref 3.8–10.5)

## 2020-02-10 PROCEDURE — 99232 SBSQ HOSP IP/OBS MODERATE 35: CPT

## 2020-02-10 RX ORDER — FERROUS SULFATE 325(65) MG
1 TABLET ORAL
Qty: 30 | Refills: 0
Start: 2020-02-10 | End: 2020-03-10

## 2020-02-10 RX ORDER — CIPROFLOXACIN LACTATE 400MG/40ML
1 VIAL (ML) INTRAVENOUS
Qty: 28 | Refills: 0
Start: 2020-02-10 | End: 2020-02-23

## 2020-02-10 RX ORDER — CIPROFLOXACIN LACTATE 400MG/40ML
500 VIAL (ML) INTRAVENOUS EVERY 12 HOURS
Refills: 0 | Status: DISCONTINUED | OUTPATIENT
Start: 2020-02-10 | End: 2020-02-11

## 2020-02-10 RX ORDER — SACCHAROMYCES BOULARDII 250 MG
1 POWDER IN PACKET (EA) ORAL
Qty: 28 | Refills: 0
Start: 2020-02-10 | End: 2020-02-23

## 2020-02-10 RX ORDER — ASCORBIC ACID 60 MG
1 TABLET,CHEWABLE ORAL
Qty: 30 | Refills: 0
Start: 2020-02-10 | End: 2020-03-10

## 2020-02-10 RX ORDER — COLLAGENASE CLOSTRIDIUM HIST. 250 UNIT/G
1 OINTMENT (GRAM) TOPICAL
Qty: 1 | Refills: 0
Start: 2020-02-10 | End: 2020-03-10

## 2020-02-10 RX ORDER — ZINC SULFATE TAB 220 MG (50 MG ZINC EQUIVALENT) 220 (50 ZN) MG
1 TAB ORAL
Qty: 30 | Refills: 0
Start: 2020-02-10 | End: 2020-03-10

## 2020-02-10 RX ADMIN — TRAMADOL HYDROCHLORIDE 25 MILLIGRAM(S): 50 TABLET ORAL at 09:35

## 2020-02-10 RX ADMIN — ENOXAPARIN SODIUM 40 MILLIGRAM(S): 100 INJECTION SUBCUTANEOUS at 10:59

## 2020-02-10 RX ADMIN — Medication 325 MILLIGRAM(S): at 10:59

## 2020-02-10 RX ADMIN — Medication 500 MILLIGRAM(S): at 11:00

## 2020-02-10 RX ADMIN — TRAMADOL HYDROCHLORIDE 25 MILLIGRAM(S): 50 TABLET ORAL at 08:14

## 2020-02-10 RX ADMIN — Medication 500 MILLIGRAM(S): at 18:16

## 2020-02-10 RX ADMIN — ZINC SULFATE TAB 220 MG (50 MG ZINC EQUIVALENT) 220 MILLIGRAM(S): 220 (50 ZN) TAB at 11:00

## 2020-02-10 RX ADMIN — PIPERACILLIN AND TAZOBACTAM 25 GRAM(S): 4; .5 INJECTION, POWDER, LYOPHILIZED, FOR SOLUTION INTRAVENOUS at 05:41

## 2020-02-10 RX ADMIN — Medication 1 APPLICATION(S): at 05:43

## 2020-02-10 NOTE — DISCHARGE NOTE PROVIDER - NSDCMRMEDTOKEN_GEN_ALL_CORE_FT
ascorbic acid 500 mg oral tablet: 1 tab(s) orally once a day  ciprofloxacin 500 mg oral tablet: 1 tab(s) orally every 12 hours  ferrous sulfate 325 mg (65 mg elemental iron) oral tablet: 1 tab(s) orally once a day   saccharomyces boulardii lyo 250 mg oral capsule: 1 cap(s) orally 2 times a day  Santyl 250 units/g topical ointment: Apply topically to affected area 2 times a day   zinc sulfate 220 mg oral capsule: 1 cap(s) orally once a day ascorbic acid 500 mg oral tablet: 1 tab(s) orally once a day  ciprofloxacin 500 mg oral tablet: 1 tab(s) orally every 12 hours  ferrous sulfate 325 mg (65 mg elemental iron) oral tablet: 1 tab(s) orally once a day   saccharomyces boulardii lyo 250 mg oral capsule: 1 cap(s) orally 2 times a day  Santyl 250 units/g topical ointment: Apply topically to affected area 2 times a day   traMADol 50 mg oral tablet: 1 tab(s) orally every 8 hours, As Needed -for severe pain MDD:3 tabs  zinc sulfate 220 mg oral capsule: 1 cap(s) orally once a day

## 2020-02-10 NOTE — PROGRESS NOTE ADULT - ASSESSMENT
80yo female PMH varicose veins, venous stasis ulcers presenting with RLE pain and wound. Patient states that she has had a chronic wound to RLE for last 3 years. She was admitted in May 2019 for the same reason, was seen by ID, vascular sx & dermetology. She had a skin bx done by dermatology & path was positive for venous stasis ulcer. Pyoderma gangrenosum was ruled out. Tissue cx grew Enterobacter colacae. CATE- showing no arterial compromise at that time. She completed ancef course for cellulitis. As per pt the wound started healing until few months ago when the scab re-opened & few days ago the ulcer just got worse with began to drain foul-smelling thick fluid and is now extremely painful. Denies fevers/chills. Takes no medications. No history of DM. No chest pain, palpitations, sob, light headedness/dizziness, difficulty breathing/cough, fevers/chills, abdominal pain, n/v, diarrhea/constipation, dysuria or increased urinary frequency. In the ED: Pt is afebrile. Vitals stable. No leukocytosis. Received IV Vanco & Zosyn x 1 dose            >RLE venous stasis non healing ulcer with ?surrounding cellulitis-   Blood culture is pending  - Wound cx done by podiatry but agree might be contaminated since wound is open chronically  - Xray neg for bone involvement   - Trend WBC and Tm, both normal  - ESR=48 and CRP=2.61  -s/p Zosyn 3.375gm q8h,  Will stop zosyn and start ciprofloxacin 500mg q12h, will adjust dose based on Creat per ID  - At least 2 weeks of treatment with follow up with ID in one week.   A1c 5.3  RLE duplex: No evidence of right lower extremity deep venous thrombosis. Superficial thrombus/thrombophlebitis involving a varicose vein in the medial distal right calf.  Warm compresses & elevation for Superficial thrombus/thrombophlebitis. Hold Motrin due to slight bump in Cr  RLE x ray:  no evidence of fracture nor destructive change. The joint spaces are maintained. Vascular calcifications are present.  CATE/PVR: no occlusion  Vascular sx appreciated. Collagenase was recommended by vascular, applying daily to the ulcer base; cover with dry gauze dressings on top; gently wrap with ACE for edema  Eventually when this cellulitis resolves, patient will need UNNA boot to right ankle   Vascular to place Unna Boot prior to d/c home today  Once the ulcer completely heals, will need long term compression stocking therapy and followup   Zinc & vitamin c for wound healing  Podiatry appreciated    Diarrhea -likely abx associated. Florastor added. No abd pain, N/V. VSS

## 2020-02-10 NOTE — DISCHARGE NOTE PROVIDER - CARE PROVIDER_API CALL
Kya Sanchez)  Vascular Surgery  [address] [place] [address]  Phone: [phone]  Fax: [phone]  Follow Up Time:     Brittany Kirk)  Infectious Disease; Internal Medicine  [address] [facility], [facility] [address]  [place] [address]  Phone: [phone]  Fax: [phone]  Follow Up Time:

## 2020-02-10 NOTE — DISCHARGE NOTE PROVIDER - HOSPITAL COURSE
80yo female PMH varicose veins, venous stasis ulcers presenting with RLE pain and wound. Patient states that she has had a chronic wound to RLE for last 3 years. She was admitted in May 2019 for the same reason, was seen by ID, vascular sx & dermetology. She had a skin bx done by dermatology & path was positive for venous stasis ulcer. Pyoderma gangrenosum was ruled out. Tissue cx grew Enterobacter colacae. CATE- showing no arterial compromise at that time. She completed ancef course for cellulitis. As per pt the wound started healing until few months ago when the scab re-opened & few days ago the ulcer just got worse with began to drain foul-smelling thick fluid and is now extremely painful. Denies fevers/chills. Takes no medications. No history of DM. No chest pain, palpitations, sob, light headedness/dizziness, difficulty breathing/cough, fevers/chills, abdominal pain, n/v, diarrhea/constipation, dysuria or increased urinary frequency. In the ED: Pt is afebrile. Vitals stable. No leukocytosis. Received IV Vanco & Zosyn x 1 dose                        >RLE venous stasis non healing ulcer with ?surrounding cellulitis-     Blood culture is pending    - Wound cx done by podiatry but agree might be contaminated since wound is open chronically    - Xray neg for bone involvement     - Trend WBC and Tm, both normal    - ESR=48 and CRP=2.61    -s/p Zosyn 3.375gm q8h,  Will stop zosyn and start ciprofloxacin 500mg q12h, will adjust dose based on Creat per ID    - At least 2 weeks of treatment with follow up with ID in one week.     A1c 5.3    RLE duplex: No evidence of right lower extremity deep venous thrombosis. Superficial thrombus/thrombophlebitis involving a varicose vein in the medial distal right calf.    Warm compresses & elevation for Superficial thrombus/thrombophlebitis. Hold Motrin due to slight bump in Cr    RLE x ray:  no evidence of fracture nor destructive change. The joint spaces are maintained. Vascular calcifications are present.    CATE/PVR: no occlusion    Vascular sx appreciated. Collagenase was recommended by vascular, applying daily to the ulcer base; cover with dry gauze dressings on top; gently wrap with ACE for edema    Eventually when this cellulitis resolves, patient will need UNNA boot to right ankle     Vascular to place Unna Boot prior to d/c home today    Once the ulcer completely heals, will need long term compression stocking therapy and followup     Zinc & vitamin c for wound healing    Podiatry appreciated        Diarrhea -likely abx associated. Florastor added. No abd pain, N/V. VSS                PHYSICAL EXAM:        GENERAL: NAD, well-groomed, well-developed    HEAD:  Atraumatic, Normocephalic    EYES: EOMI, PERRLA, conjunctiva and sclera clear    NECK: Supple, No JVD    NERVOUS SYSTEM:  Alert & Oriented X3, Motor Strength 5/5 B/L upper and lower extremities; DTRs 2+ intact and symmetric    CHEST/LUNG: Clear to percussion bilaterally; No rales, rhonchi, wheezing, or rubs    HEART: Regular rate and rhythm; No murmurs, rubs, or gallops    ABDOMEN: Soft, Nontender, Nondistended; Bowel sounds present    EXTREMITIES:  weakn DP pulses b/l, RLE edema with chronic venous stasis changes, venous stasis ulcer in dressing c/d/i

## 2020-02-10 NOTE — PHYSICAL THERAPY INITIAL EVALUATION ADULT - IMPAIRMENTS FOUND, PT EVAL
gait, locomotion, and balance/circulation/integumentary integrity/aerobic capacity/endurance/muscle strength

## 2020-02-10 NOTE — PROGRESS NOTE ADULT - ATTENDING COMMENTS
D/c done but pt does not want to go home with DIL Keke Coronado. Only wants to go home with cousin Keke Huang. Ms Huang called, she cannot come to  to today but will come in tomorrow. SW involved regarding concerns about pt safety

## 2020-02-10 NOTE — PHYSICAL THERAPY INITIAL EVALUATION ADULT - ADDITIONAL COMMENTS
Patient lives with niece in 1 story home with 3 JUDSON and a wall to hold onto for stability on the L side ascending. Patient reports having a SAC for outdoor use and no other DME.

## 2020-02-10 NOTE — PROGRESS NOTE ADULT - SUBJECTIVE AND OBJECTIVE BOX
[facility]  INFECTIOUS DISEASES AND INTERNAL MEDICINE at [place]  =======================================================  Sourav Desai MD  Diplomates American Board of Internal Medicine and Infectious Diseases  =======================================================    [id number]  RONALD ALDRICH     Follow up;  leg wound and cellulitis     Less pain. dressing changed this morning, looks better, less erythema and swelling.  No fever.   No diarrhea     PAST MEDICAL & SURGICAL HISTORY:  Recurrent varicose veins  Venous stasis dermatitis of lower extremity  No significant past surgical history    Social Hx: stopped smoking about 37years ago, no drinking or drugs    FAMILY HISTORY: None    Allergies  No Known Allergies    Antibiotics:  piperacillin/tazobactam IVPB.. 3.375 Gram(s) IV Intermittent every 8 hours     REVIEW OF SYSTEMS:  CONSTITUTIONAL:  No Fever or chills  HEENT:  No diplopia or blurred vision.  No sore throat or runny nose.  CARDIOVASCULAR:  No chest pain or SOB.  RESPIRATORY:  No cough, shortness of breath, PND or orthopnea.  GASTROINTESTINAL:  No nausea, vomiting or diarrhea.  GENITOURINARY:  No dysuria, frequency or urgency. No Blood in urine  MUSCULOSKELETAL:  no joint aches, no muscle pain  SKIN:  chronic leg ulcer  NEUROLOGIC:  No paresthesias, fasciculations, seizures or weakness.  PSYCHIATRIC:  No disorder of thought or mood.  ENDOCRINE:  No heat or cold intolerance, polyuria or polydipsia.  HEMATOLOGICAL:  No easy bruising or bleeding.     Physical Exam:  Vital Signs Last 24 Hrs  T(C): 36.7 (10 Feb 2020 07:29), Max: 36.7 (09 Feb 2020 15:37)  T(F): 98 (10 Feb 2020 07:29), Max: 98.1 (09 Feb 2020 15:37)  HR: 71 (10 Feb 2020 07:29) (71 - 77)  BP: 123/67 (10 Feb 2020 07:29) (119/63 - 129/71)  BP(mean): --  RR: 17 (10 Feb 2020 07:29) (17 - 18)  SpO2: 96% (10 Feb 2020 07:29) (96% - 98%)  GEN: NAD  HEENT: normocephalic and atraumatic. EOMI. PERRL.    NECK: Supple.  No lymphadenopathy   LUNGS: Clear to auscultation.  HEART: Regular rate and rhythm without murmur.  ABDOMEN: Soft, nontender, and nondistended.  Positive bowel sounds.    : No CVA tenderness  EXTREMITIES: R lower leg with severe varicosis, R lower leg the angle of dorsal foot and shin has a large ulcer, minimal clear discharge, no smell, mild swelling and erythema   NEUROLOGIC: grossly intact.  PSYCHIATRIC: Appropriate affect .  SKIN: as above     Labs:  02-10    139  |  101  |  21.0<H>  ----------------------------<  86  4.1   |  24.0  |  1.18    Ca    9.7      10 Feb 2020 09:06  Phos  4.0     02-10  Mg     2.1     02-10                        10.5   3.77  )-----------( 271      ( 10 Feb 2020 09:06 )             34.6     RECENT CULTURES:  02-08 @ 15:58 .Other right ankle ulcer Pseudomonas aeruginosa    Few Pseudomonas aeruginosa    02-07 @ 11:00 .Blood     No growth at 48 hours    All imaging and other data have been reviewed.  < from: Xray Tibia + Fibula 2 Views, Right (02.07.20 @ 10:50) >   EXAM:  TIBIA FIBULA-RIGHT                        PROCEDURE DATE:  02/07/2020    INTERPRETATION:  Right leg  HISTORY: Distal leg wound    Two views of the right leg show no evidence of fracture nor destructive change. The joint spaces are maintained. Vascular calcifications are present.  IMPRESSION: No evidence of bony destruction.    Assessment and Plan:   80 y/o woman with PMH of Varicose veins and chronic R lower extremity ulcer for about 3-4 years, was admitted with worsening of leg ulcer and clear discharge.   She was admitted to [facility] in may 2019 for the same reason. At that time had enterobacter growing from wound. She was started on antibiotics but left for [place] and stopped ABx. Had biopsy that didn't show any malignancy or pyoderma gangrenosum just proliferation of venous capillaries. Since wound is open can get 2' infection, there is mild cellulitis but the main issue is varicosis and chronic ulcer that needs wound care and vascular intervention.    Chronic leg ulcer  Varicosis     - Blood culture NGTD  - Wound culture with pseudomonas   - Doppler neg for DVT  - Xray neg for bone involvement   - Vascular is following for wound care  - Trend WBC and Tm, both normal  - ESR=48 and CRP=2.61  - Will stop zosyn and start ciprofloxacin 500mg q12h, will adjust dose based on Creat.  - At least 2 weeks of treatment with follow up with ID in one week.   - Wound care as per vascular     Will follow. [facility]  INFECTIOUS DISEASES AND INTERNAL MEDICINE at [place]  =======================================================  Sourav Desai MD  Diplomates American Board of Internal Medicine and Infectious Diseases  =======================================================    [id number]  RONALD ALDRICH     Follow up;  leg wound and cellulitis     Pain, erythema and swelling all are improved.   No fever.   No diarrhea     PAST MEDICAL & SURGICAL HISTORY:  Recurrent varicose veins  Venous stasis dermatitis of lower extremity  No significant past surgical history    Social Hx: stopped smoking about 37years ago, no drinking or drugs    FAMILY HISTORY: None    Allergies  No Known Allergies    Antibiotics:  piperacillin/tazobactam IVPB.. 3.375 Gram(s) IV Intermittent every 8 hours     REVIEW OF SYSTEMS:  CONSTITUTIONAL:  No Fever or chills  HEENT:  No diplopia or blurred vision.  No sore throat or runny nose.  CARDIOVASCULAR:  No chest pain or SOB.  RESPIRATORY:  No cough, shortness of breath, PND or orthopnea.  GASTROINTESTINAL:  No nausea, vomiting or diarrhea.  GENITOURINARY:  No dysuria, frequency or urgency. No Blood in urine  MUSCULOSKELETAL:  no joint aches, no muscle pain  SKIN:  chronic leg ulcer  NEUROLOGIC:  No paresthesias, fasciculations, seizures or weakness.  PSYCHIATRIC:  No disorder of thought or mood.  ENDOCRINE:  No heat or cold intolerance, polyuria or polydipsia.  HEMATOLOGICAL:  No easy bruising or bleeding.     Physical Exam:  Vital Signs Last 24 Hrs  T(C): 36.5 (11 Feb 2020 07:49), Max: 36.9 (10 Feb 2020 15:31)  T(F): 97.7 (11 Feb 2020 07:49), Max: 98.4 (10 Feb 2020 15:31)  HR: 76 (11 Feb 2020 07:49) (73 - 76)  BP: 122/74 (11 Feb 2020 07:49) (122/74 - 235/68)  BP(mean): --  RR: 17 (11 Feb 2020 07:49) (17 - 18)  SpO2: 97% (11 Feb 2020 07:49) (97% - 97%)  GEN: NAD  HEENT: normocephalic and atraumatic. EOMI. PERRL.    NECK: Supple.  No lymphadenopathy   LUNGS: Clear to auscultation.  HEART: Regular rate and rhythm without murmur.  ABDOMEN: Soft, nontender, and nondistended.  Positive bowel sounds.    : No CVA tenderness  EXTREMITIES: R lower leg with severe varicosis, R lower leg the angle of dorsal foot and shin has a large ulcer, minimal clear discharge, no smell, mild swelling and erythema   NEUROLOGIC: grossly intact.  PSYCHIATRIC: Appropriate affect .  SKIN: as above     Labs:  02-11    135  |  99  |  31.0<H>  ----------------------------<  91  4.6   |  24.0  |  1.06    Ca    10.0      11 Feb 2020 08:26  Phos  3.6     02-11  Mg     2.1     02-11                        11.2   4.25  )-----------( 271      ( 11 Feb 2020 08:26 )             37.1    RECENT CULTURES:  02-08 @ 15:58 .Other right ankle ulcer Pseudomonas aeruginosa    Few Pseudomonas aeruginosa    02-07 @ 11:00 .Blood     No growth at 48 hours    All imaging and other data have been reviewed.  < from: Xray Tibia + Fibula 2 Views, Right (02.07.20 @ 10:50) >   EXAM:  TIBIA FIBULA-RIGHT                        PROCEDURE DATE:  02/07/2020    INTERPRETATION:  Right leg  HISTORY: Distal leg wound    Two views of the right leg show no evidence of fracture nor destructive change. The joint spaces are maintained. Vascular calcifications are present.  IMPRESSION: No evidence of bony destruction.    Assessment and Plan:   80 y/o woman with PMH of Varicose veins and chronic R lower extremity ulcer for about 3-4 years, was admitted with worsening of leg ulcer and clear discharge.   She was admitted to [facility] in may 2019 for the same reason. At that time had enterobacter growing from wound. She was started on antibiotics but left for [place] and stopped ABx. Had biopsy that didn't show any malignancy or pyoderma gangrenosum just proliferation of venous capillaries. Since wound is open can get 2' infection, there is mild cellulitis but the main issue is varicosis and chronic ulcer that needs wound care and vascular intervention.    Chronic leg ulcer  Varicosis     - Blood culture NGTD  - Wound culture with pseudomonas   - Doppler neg for DVT  - Xray neg for bone involvement   - Vascular is following for wound care  - Trend WBC and Tm, both normal  - ESR=48 and CRP=2.61  - Will stop zosyn and start ciprofloxacin 500mg q12h, will adjust dose based on Creat.  - At least 2 weeks of treatment with follow up with ID in one week.   - Wound care as per vascular     Will follow.

## 2020-02-10 NOTE — CHART NOTE - NSCHARTNOTEFT_GEN_A_CORE
patient with bilateral venous insuffiency, right le presumed stasis ulceration    Bilateral unna boots placed    - have patient follow up in office in one week   - Do not get the dressings wet.

## 2020-02-10 NOTE — DISCHARGE NOTE PROVIDER - CARE PROVIDERS DIRECT ADDRESSES
,DirectAddress_Unknown,[email or web address]

## 2020-02-10 NOTE — DISCHARGE NOTE PROVIDER - NSDCCPCAREPLAN_GEN_ALL_CORE_FT
PRINCIPAL DISCHARGE DIAGNOSIS  Diagnosis: Cellulitis  Assessment and Plan of Treatment: Continue with meds as directed. Follow up with infectious disease within 1 week of discharge      SECONDARY DISCHARGE DIAGNOSES  Diagnosis: Chronic venous stasis dermatitis  Assessment and Plan of Treatment: Follow up with vascular surgery within 1 week of discharge. Use unna boot as prescribed

## 2020-02-10 NOTE — PHYSICAL THERAPY INITIAL EVALUATION ADULT - GAIT DEVIATIONS NOTED, PT EVAL
Pt ambulated with right TTWB gait pattern secondary to exacerbation of pain in right LE/decreased weight-shifting ability/decreased step length/decreased antolin

## 2020-02-10 NOTE — PHYSICAL THERAPY INITIAL EVALUATION ADULT - MANUAL MUSCLE TESTING RESULTS, REHAB EVAL
Pt grossly > 3+/5 for bilateral UE's and LE's. Unable to assess R ankle 2* increased pain/no strength deficits were identified

## 2020-02-11 VITALS
RESPIRATION RATE: 18 BRPM | TEMPERATURE: 98 F | OXYGEN SATURATION: 97 % | DIASTOLIC BLOOD PRESSURE: 76 MMHG | HEART RATE: 78 BPM | SYSTOLIC BLOOD PRESSURE: 132 MMHG

## 2020-02-11 LAB
ANION GAP SERPL CALC-SCNC: 12 MMOL/L — SIGNIFICANT CHANGE UP (ref 5–17)
BASOPHILS # BLD AUTO: 0.03 K/UL — SIGNIFICANT CHANGE UP (ref 0–0.2)
BASOPHILS NFR BLD AUTO: 0.7 % — SIGNIFICANT CHANGE UP (ref 0–2)
BUN SERPL-MCNC: 31 MG/DL — HIGH (ref 8–20)
CALCIUM SERPL-MCNC: 10 MG/DL — SIGNIFICANT CHANGE UP (ref 8.6–10.2)
CHLORIDE SERPL-SCNC: 99 MMOL/L — SIGNIFICANT CHANGE UP (ref 98–107)
CO2 SERPL-SCNC: 24 MMOL/L — SIGNIFICANT CHANGE UP (ref 22–29)
CREAT SERPL-MCNC: 1.06 MG/DL — SIGNIFICANT CHANGE UP (ref 0.5–1.3)
EOSINOPHIL # BLD AUTO: 0.13 K/UL — SIGNIFICANT CHANGE UP (ref 0–0.5)
EOSINOPHIL NFR BLD AUTO: 3.1 % — SIGNIFICANT CHANGE UP (ref 0–6)
GLUCOSE SERPL-MCNC: 91 MG/DL — SIGNIFICANT CHANGE UP (ref 70–99)
HCT VFR BLD CALC: 37.1 % — SIGNIFICANT CHANGE UP (ref 34.5–45)
HGB BLD-MCNC: 11.2 G/DL — LOW (ref 11.5–15.5)
IMM GRANULOCYTES NFR BLD AUTO: 0.5 % — SIGNIFICANT CHANGE UP (ref 0–1.5)
LYMPHOCYTES # BLD AUTO: 1.75 K/UL — SIGNIFICANT CHANGE UP (ref 1–3.3)
LYMPHOCYTES # BLD AUTO: 41.2 % — SIGNIFICANT CHANGE UP (ref 13–44)
MAGNESIUM SERPL-MCNC: 2.1 MG/DL — SIGNIFICANT CHANGE UP (ref 1.6–2.6)
MCHC RBC-ENTMCNC: 27.8 PG — SIGNIFICANT CHANGE UP (ref 27–34)
MCHC RBC-ENTMCNC: 30.2 GM/DL — LOW (ref 32–36)
MCV RBC AUTO: 92.1 FL — SIGNIFICANT CHANGE UP (ref 80–100)
MONOCYTES # BLD AUTO: 0.44 K/UL — SIGNIFICANT CHANGE UP (ref 0–0.9)
MONOCYTES NFR BLD AUTO: 10.4 % — SIGNIFICANT CHANGE UP (ref 2–14)
NEUTROPHILS # BLD AUTO: 1.88 K/UL — SIGNIFICANT CHANGE UP (ref 1.8–7.4)
NEUTROPHILS NFR BLD AUTO: 44.1 % — SIGNIFICANT CHANGE UP (ref 43–77)
PHOSPHATE SERPL-MCNC: 3.6 MG/DL — SIGNIFICANT CHANGE UP (ref 2.4–4.7)
PLATELET # BLD AUTO: 271 K/UL — SIGNIFICANT CHANGE UP (ref 150–400)
POTASSIUM SERPL-MCNC: 4.6 MMOL/L — SIGNIFICANT CHANGE UP (ref 3.5–5.3)
POTASSIUM SERPL-SCNC: 4.6 MMOL/L — SIGNIFICANT CHANGE UP (ref 3.5–5.3)
RBC # BLD: 4.03 M/UL — SIGNIFICANT CHANGE UP (ref 3.8–5.2)
RBC # FLD: 13.2 % — SIGNIFICANT CHANGE UP (ref 10.3–14.5)
SODIUM SERPL-SCNC: 135 MMOL/L — SIGNIFICANT CHANGE UP (ref 135–145)
WBC # BLD: 4.25 K/UL — SIGNIFICANT CHANGE UP (ref 3.8–10.5)
WBC # FLD AUTO: 4.25 K/UL — SIGNIFICANT CHANGE UP (ref 3.8–10.5)

## 2020-02-11 PROCEDURE — 97530 THERAPEUTIC ACTIVITIES: CPT

## 2020-02-11 PROCEDURE — 86140 C-REACTIVE PROTEIN: CPT

## 2020-02-11 PROCEDURE — 84145 PROCALCITONIN (PCT): CPT

## 2020-02-11 PROCEDURE — 73590 X-RAY EXAM OF LOWER LEG: CPT

## 2020-02-11 PROCEDURE — 83010 ASSAY OF HAPTOGLOBIN QUANT: CPT

## 2020-02-11 PROCEDURE — 97163 PT EVAL HIGH COMPLEX 45 MIN: CPT

## 2020-02-11 PROCEDURE — 82272 OCCULT BLD FECES 1-3 TESTS: CPT

## 2020-02-11 PROCEDURE — 36415 COLL VENOUS BLD VENIPUNCTURE: CPT

## 2020-02-11 PROCEDURE — 83036 HEMOGLOBIN GLYCOSYLATED A1C: CPT

## 2020-02-11 PROCEDURE — 83615 LACTATE (LD) (LDH) ENZYME: CPT

## 2020-02-11 PROCEDURE — 80053 COMPREHEN METABOLIC PANEL: CPT

## 2020-02-11 PROCEDURE — 85045 AUTOMATED RETICULOCYTE COUNT: CPT

## 2020-02-11 PROCEDURE — 85018 HEMOGLOBIN: CPT

## 2020-02-11 PROCEDURE — 87040 BLOOD CULTURE FOR BACTERIA: CPT

## 2020-02-11 PROCEDURE — 93923 UPR/LXTR ART STDY 3+ LVLS: CPT

## 2020-02-11 PROCEDURE — 80048 BASIC METABOLIC PNL TOTAL CA: CPT

## 2020-02-11 PROCEDURE — 96375 TX/PRO/DX INJ NEW DRUG ADDON: CPT

## 2020-02-11 PROCEDURE — 99285 EMERGENCY DEPT VISIT HI MDM: CPT | Mod: 25

## 2020-02-11 PROCEDURE — 84100 ASSAY OF PHOSPHORUS: CPT

## 2020-02-11 PROCEDURE — 87186 SC STD MICRODIL/AGAR DIL: CPT

## 2020-02-11 PROCEDURE — 84466 ASSAY OF TRANSFERRIN: CPT

## 2020-02-11 PROCEDURE — 82607 VITAMIN B-12: CPT

## 2020-02-11 PROCEDURE — 84443 ASSAY THYROID STIM HORMONE: CPT

## 2020-02-11 PROCEDURE — 97116 GAIT TRAINING THERAPY: CPT

## 2020-02-11 PROCEDURE — 83605 ASSAY OF LACTIC ACID: CPT

## 2020-02-11 PROCEDURE — 96374 THER/PROPH/DIAG INJ IV PUSH: CPT

## 2020-02-11 PROCEDURE — 83540 ASSAY OF IRON: CPT

## 2020-02-11 PROCEDURE — 82746 ASSAY OF FOLIC ACID SERUM: CPT

## 2020-02-11 PROCEDURE — 99239 HOSP IP/OBS DSCHRG MGMT >30: CPT

## 2020-02-11 PROCEDURE — 87070 CULTURE OTHR SPECIMN AEROBIC: CPT

## 2020-02-11 PROCEDURE — 85014 HEMATOCRIT: CPT

## 2020-02-11 PROCEDURE — 83735 ASSAY OF MAGNESIUM: CPT

## 2020-02-11 PROCEDURE — 85652 RBC SED RATE AUTOMATED: CPT

## 2020-02-11 PROCEDURE — 83550 IRON BINDING TEST: CPT

## 2020-02-11 PROCEDURE — 85027 COMPLETE CBC AUTOMATED: CPT

## 2020-02-11 PROCEDURE — 82728 ASSAY OF FERRITIN: CPT

## 2020-02-11 PROCEDURE — 99232 SBSQ HOSP IP/OBS MODERATE 35: CPT

## 2020-02-11 PROCEDURE — 93971 EXTREMITY STUDY: CPT

## 2020-02-11 RX ORDER — TRAMADOL HYDROCHLORIDE 50 MG/1
1 TABLET ORAL
Qty: 30 | Refills: 0
Start: 2020-02-11 | End: 2020-02-20

## 2020-02-11 RX ADMIN — Medication 325 MILLIGRAM(S): at 11:41

## 2020-02-11 RX ADMIN — Medication 500 MILLIGRAM(S): at 11:41

## 2020-02-11 RX ADMIN — TRAMADOL HYDROCHLORIDE 50 MILLIGRAM(S): 50 TABLET ORAL at 06:40

## 2020-02-11 RX ADMIN — ENOXAPARIN SODIUM 40 MILLIGRAM(S): 100 INJECTION SUBCUTANEOUS at 11:41

## 2020-02-11 RX ADMIN — ZINC SULFATE TAB 220 MG (50 MG ZINC EQUIVALENT) 220 MILLIGRAM(S): 220 (50 ZN) TAB at 11:41

## 2020-02-11 RX ADMIN — TRAMADOL HYDROCHLORIDE 50 MILLIGRAM(S): 50 TABLET ORAL at 05:40

## 2020-02-11 RX ADMIN — Medication 500 MILLIGRAM(S): at 05:24

## 2020-02-11 NOTE — PROGRESS NOTE ADULT - SUBJECTIVE AND OBJECTIVE BOX
[facility]  INFECTIOUS DISEASES AND INTERNAL MEDICINE at [place]  =======================================================  Sourav Desai MD  Diplomates American Board of Internal Medicine and Infectious Diseases  =======================================================    [id number]  RONALD ALDRICH     Follow up;  leg wound and cellulitis     Less pain. dressing changed this morning, looks better, less erythema and swelling.  No fever.   No diarrhea     PAST MEDICAL & SURGICAL HISTORY:  Recurrent varicose veins  Venous stasis dermatitis of lower extremity  No significant past surgical history    Social Hx: stopped smoking about 37years ago, no drinking or drugs    FAMILY HISTORY: None    Allergies  No Known Allergies    Antibiotics:  piperacillin/tazobactam IVPB.. 3.375 Gram(s) IV Intermittent every 8 hours     REVIEW OF SYSTEMS:  CONSTITUTIONAL:  No Fever or chills  HEENT:  No diplopia or blurred vision.  No sore throat or runny nose.  CARDIOVASCULAR:  No chest pain or SOB.  RESPIRATORY:  No cough, shortness of breath, PND or orthopnea.  GASTROINTESTINAL:  No nausea, vomiting or diarrhea.  GENITOURINARY:  No dysuria, frequency or urgency. No Blood in urine  MUSCULOSKELETAL:  no joint aches, no muscle pain  SKIN:  chronic leg ulcer  NEUROLOGIC:  No paresthesias, fasciculations, seizures or weakness.  PSYCHIATRIC:  No disorder of thought or mood.  ENDOCRINE:  No heat or cold intolerance, polyuria or polydipsia.  HEMATOLOGICAL:  No easy bruising or bleeding.     Physical Exam:  Vital Signs Last 24 Hrs  T(C): 36.7 (10 Feb 2020 07:29), Max: 36.7 (09 Feb 2020 15:37)  T(F): 98 (10 Feb 2020 07:29), Max: 98.1 (09 Feb 2020 15:37)  HR: 71 (10 Feb 2020 07:29) (71 - 77)  BP: 123/67 (10 Feb 2020 07:29) (119/63 - 129/71)  BP(mean): --  RR: 17 (10 Feb 2020 07:29) (17 - 18)  SpO2: 96% (10 Feb 2020 07:29) (96% - 98%)  GEN: NAD  HEENT: normocephalic and atraumatic. EOMI. PERRL.    NECK: Supple.  No lymphadenopathy   LUNGS: Clear to auscultation.  HEART: Regular rate and rhythm without murmur.  ABDOMEN: Soft, nontender, and nondistended.  Positive bowel sounds.    : No CVA tenderness  EXTREMITIES: R lower leg with severe varicosis, R lower leg the angle of dorsal foot and shin has a large ulcer, minimal clear discharge, no smell, mild swelling and erythema   NEUROLOGIC: grossly intact.  PSYCHIATRIC: Appropriate affect .  SKIN: as above     Labs:  02-10    139  |  101  |  21.0<H>  ----------------------------<  86  4.1   |  24.0  |  1.18    Ca    9.7      10 Feb 2020 09:06  Phos  4.0     02-10  Mg     2.1     02-10                        10.5   3.77  )-----------( 271      ( 10 Feb 2020 09:06 )             34.6     RECENT CULTURES:  02-08 @ 15:58 .Other right ankle ulcer Pseudomonas aeruginosa    Few Pseudomonas aeruginosa    02-07 @ 11:00 .Blood     No growth at 48 hours    All imaging and other data have been reviewed.  < from: Xray Tibia + Fibula 2 Views, Right (02.07.20 @ 10:50) >   EXAM:  TIBIA FIBULA-RIGHT                        PROCEDURE DATE:  02/07/2020    INTERPRETATION:  Right leg  HISTORY: Distal leg wound    Two views of the right leg show no evidence of fracture nor destructive change. The joint spaces are maintained. Vascular calcifications are present.  IMPRESSION: No evidence of bony destruction.    Assessment and Plan:   78 y/o woman with PMH of Varicose veins and chronic R lower extremity ulcer for about 3-4 years, was admitted with worsening of leg ulcer and clear discharge.   She was admitted to [facility] in may 2019 for the same reason. At that time had enterobacter growing from wound. She was started on antibiotics but left for [place] and stopped ABx. Had biopsy that didn't show any malignancy or pyoderma gangrenosum just proliferation of venous capillaries. Since wound is open can get 2' infection, there is mild cellulitis but the main issue is varicosis and chronic ulcer that needs wound care and vascular intervention.    Chronic leg ulcer  Varicosis     - Blood culture NGTD  - Wound culture with pseudomonas   - Doppler neg for DVT  - Xray neg for bone involvement   - Vascular is following for wound care  - Trend WBC and Tm, both normal  - ESR=48 and CRP=2.61  - Continue ciprofloxacin 500mg q12h, will adjust dose based on Creat.  - At least 2 weeks of treatment with follow up with ID in one week.   - Wound care as per vascular     Will sign off please call with any question.

## 2020-02-12 LAB
CULTURE RESULTS: SIGNIFICANT CHANGE UP
CULTURE RESULTS: SIGNIFICANT CHANGE UP
SPECIMEN SOURCE: SIGNIFICANT CHANGE UP
SPECIMEN SOURCE: SIGNIFICANT CHANGE UP

## 2020-02-20 ENCOUNTER — APPOINTMENT (OUTPATIENT)
Dept: VASCULAR SURGERY | Facility: CLINIC | Age: 80
End: 2020-02-20

## 2020-02-28 ENCOUNTER — APPOINTMENT (OUTPATIENT)
Dept: VASCULAR SURGERY | Facility: CLINIC | Age: 80
End: 2020-02-28
Payer: MEDICAID

## 2020-02-28 VITALS
HEART RATE: 84 BPM | BODY MASS INDEX: 27.27 KG/M2 | OXYGEN SATURATION: 96 % | TEMPERATURE: 97.6 F | WEIGHT: 152 LBS | DIASTOLIC BLOOD PRESSURE: 73 MMHG | HEIGHT: 62.5 IN | SYSTOLIC BLOOD PRESSURE: 132 MMHG

## 2020-02-28 PROCEDURE — 99214 OFFICE O/P EST MOD 30 MIN: CPT

## 2020-02-28 NOTE — HISTORY OF PRESENT ILLNESS
[FreeTextEntry1] : 78 yo female PMH varicose veins, venous stasis ulcers presenting with E pain and wound. Patient states that she has had a chronic wound to [facility] for last 3 years. Ulcers heal and quickly return. She has had multiple hospitalizations for right leg cellulitis and was recently discharged from [facility] for this reason.\par Patient states that she has leg swelling that worsens though the day. She is unable to wear compression stockings due to her painful right leg ulcer.\par She denies previous DVT\par \par

## 2020-02-28 NOTE — PHYSICAL EXAM
[JVD] : no jugular venous distention  [Normal Rate and Rhythm] : normal rate and rhythm [Normal Breath Sounds] : Normal breath sounds [2+] : left 2+ [Ankle Swelling (On Exam)] : present [Varicose Veins Of Lower Extremities] : bilaterally [Ankle Swelling On The Left] : moderate [] : bilaterally [Ankle Swelling Bilaterally] : severe [Abdomen Masses] : No abdominal masses [Abdomen Tenderness] : ~T ~M No abdominal tenderness [Alert] : alert [Oriented to Place] : oriented to place [Oriented to Person] : oriented to person [Oriented to Time] : oriented to time [Calm] : calm [de-identified] : Well appearing [FreeTextEntry1] : 4 cm x 5 cm right lower leg superficial venous ulcer, good granulation, no evidence of infection [de-identified] : As above

## 2020-02-28 NOTE — ASSESSMENT
[FreeTextEntry1] : 79 year old female with recurrent RLE venous ulcer secondary to venous stasis.\par Inpatient venous duplex showed no DVT.\par Unna boot applied top RLE\par Will change Unna boots weekly till wound is healed\par Patient will get a venous reflux US when right leg wound is less weepy\par RTO 1 week

## 2020-03-06 ENCOUNTER — APPOINTMENT (OUTPATIENT)
Dept: VASCULAR SURGERY | Facility: CLINIC | Age: 80
End: 2020-03-06
Payer: SELF-PAY

## 2020-03-06 VITALS
TEMPERATURE: 97.4 F | WEIGHT: 152 LBS | BODY MASS INDEX: 27.27 KG/M2 | HEART RATE: 68 BPM | HEIGHT: 62.5 IN | DIASTOLIC BLOOD PRESSURE: 76 MMHG | OXYGEN SATURATION: 96 % | SYSTOLIC BLOOD PRESSURE: 146 MMHG

## 2020-03-06 DIAGNOSIS — Z78.9 OTHER SPECIFIED HEALTH STATUS: ICD-10-CM

## 2020-03-06 PROCEDURE — 29580 STRAPPING UNNA BOOT: CPT | Mod: RT

## 2020-03-12 ENCOUNTER — APPOINTMENT (OUTPATIENT)
Dept: VASCULAR SURGERY | Facility: CLINIC | Age: 80
End: 2020-03-12

## 2020-06-25 ENCOUNTER — APPOINTMENT (OUTPATIENT)
Dept: VASCULAR SURGERY | Facility: CLINIC | Age: 80
End: 2020-06-25
Payer: MEDICAID

## 2020-06-25 VITALS
BODY MASS INDEX: 30.91 KG/M2 | DIASTOLIC BLOOD PRESSURE: 76 MMHG | HEIGHT: 62 IN | WEIGHT: 168 LBS | TEMPERATURE: 97.6 F | HEART RATE: 73 BPM | SYSTOLIC BLOOD PRESSURE: 135 MMHG | OXYGEN SATURATION: 99 %

## 2020-06-25 DIAGNOSIS — I83.003 VARICOSE VEINS OF UNSPECIFIED LOWER EXTREMITY WITH ULCER OF ANKLE: ICD-10-CM

## 2020-06-25 DIAGNOSIS — L97.309 VARICOSE VEINS OF UNSPECIFIED LOWER EXTREMITY WITH ULCER OF ANKLE: ICD-10-CM

## 2020-06-25 PROCEDURE — 93971 EXTREMITY STUDY: CPT

## 2020-06-25 PROCEDURE — 99213 OFFICE O/P EST LOW 20 MIN: CPT

## 2020-06-25 NOTE — PHYSICAL EXAM
[Normal Breath Sounds] : Normal breath sounds [Normal Rate and Rhythm] : normal rate and rhythm [2+] : left 2+ [Ankle Swelling (On Exam)] : present [Ankle Swelling On The Left] : moderate [Varicose Veins Of Lower Extremities] : present [Ankle Swelling Bilaterally] : severe [] : present [Alert] : alert [Oriented to Place] : oriented to place [Oriented to Person] : oriented to person [Oriented to Time] : oriented to time [Calm] : calm [JVD] : no jugular venous distention  [Abdomen Masses] : No abdominal masses [de-identified] : Well appearing [Abdomen Tenderness] : ~T ~M No abdominal tenderness [de-identified] : As above [FreeTextEntry1] : healed right leg venous ulcer

## 2020-06-25 NOTE — ASSESSMENT
[FreeTextEntry1] : 79 year old female with advanced venous insufficiency (CEAP C5) and healed right leg venous ulcer\par Venous duplex showed no DVT.She has no deep reflux or SVT reflux. There are multiple incompetent tributaries.\par -Continue daily compression regimen\par -Keep legs moisturized and elevated\par -Return PRN

## 2020-06-25 NOTE — HISTORY OF PRESENT ILLNESS
[de-identified] : Right leg venous ulcer has healed.\par She does not tolerate compression stockings due to severe pain. Her son applies compression ace wraps daily.\par She continues to have lower leg pain, tiredness and tightness [FreeTextEntry1] : 78 yo female PMH varicose veins, venous stasis ulcers presenting with E pain and wound. Patient states that she has had a chronic wound to [facility] for last 3 years. Ulcers heal and quickly return. She has had multiple hospitalizations for right leg cellulitis and was recently discharged from [facility] for this reason.\par Patient states that she has leg swelling that worsens though the day. She is unable to wear compression stockings due to her painful right leg ulcer.\par She denies previous DVT\par \par

## 2020-08-20 NOTE — PROGRESS NOTE ADULT - ASSESSMENT
This 79 y/o female with chronic RLE ulcer that has been there for past 3 years.   PT PLACED ON EMPIRIC CEFAZOLIN FOR POSSIBLE CELLULITIS      PYODERMA GANGRENOSUM   VS VENOUS STASIS ULCER  NO CELLULITIS WILL D/C ANCEF  PT ON MINOCYCLINE FOR POSSIBLE PYODERMA  CONSIDER WEDGE BX OF EDGE OF ULCER  WILL FOLLOW UP  D/W HOSPITALIST [Follow-Up Visit] : a follow-up visit for [FreeTextEntry2] : her right elbow

## 2020-11-19 NOTE — ED PROVIDER NOTE - ENMT, MLM
Refill requested for hydroxychloroquine. Pt last seen by Dr. Arguelles 7/16/2020 and currently does not have a follow up appt scheduled. Routed to Dr. Arguelles.   
Airway patent, Nasal mucosa clear. Mouth with normal mucosa.

## 2021-04-22 ENCOUNTER — EMERGENCY (EMERGENCY)
Facility: HOSPITAL | Age: 81
LOS: 1 days | Discharge: HOME | End: 2021-04-22
Attending: EMERGENCY MEDICINE
Payer: MEDICAID

## 2021-04-22 VITALS
HEIGHT: 62 IN | OXYGEN SATURATION: 100 % | RESPIRATION RATE: 18 BRPM | SYSTOLIC BLOOD PRESSURE: 147 MMHG | DIASTOLIC BLOOD PRESSURE: 78 MMHG | HEART RATE: 79 BPM | WEIGHT: 164.91 LBS | TEMPERATURE: 99 F

## 2021-04-22 LAB
ALBUMIN SERPL ELPH-MCNC: 3.8 G/DL — SIGNIFICANT CHANGE UP (ref 3.3–5.2)
ALP SERPL-CCNC: 89 U/L — SIGNIFICANT CHANGE UP (ref 40–120)
ALT FLD-CCNC: 13 U/L — SIGNIFICANT CHANGE UP
ANION GAP SERPL CALC-SCNC: 11 MMOL/L — SIGNIFICANT CHANGE UP (ref 5–17)
APPEARANCE UR: CLEAR — SIGNIFICANT CHANGE UP
APTT BLD: 29.8 SEC — SIGNIFICANT CHANGE UP (ref 27.5–35.5)
AST SERPL-CCNC: 37 U/L — HIGH
BACTERIA # UR AUTO: ABNORMAL
BASE EXCESS BLDV CALC-SCNC: 5.2 MMOL/L — HIGH (ref -2–2)
BASOPHILS # BLD AUTO: 0.03 K/UL — SIGNIFICANT CHANGE UP (ref 0–0.2)
BASOPHILS NFR BLD AUTO: 0.8 % — SIGNIFICANT CHANGE UP (ref 0–2)
BILIRUB SERPL-MCNC: 0.4 MG/DL — SIGNIFICANT CHANGE UP (ref 0.4–2)
BILIRUB UR-MCNC: NEGATIVE — SIGNIFICANT CHANGE UP
BUN SERPL-MCNC: 16 MG/DL — SIGNIFICANT CHANGE UP (ref 8–20)
CA-I SERPL-SCNC: 1.1 MMOL/L — LOW (ref 1.15–1.33)
CALCIUM SERPL-MCNC: 9.6 MG/DL — SIGNIFICANT CHANGE UP (ref 8.6–10.2)
CHLORIDE BLDV-SCNC: 103 MMOL/L — SIGNIFICANT CHANGE UP (ref 98–107)
CHLORIDE SERPL-SCNC: 101 MMOL/L — SIGNIFICANT CHANGE UP (ref 98–107)
CO2 SERPL-SCNC: 27 MMOL/L — SIGNIFICANT CHANGE UP (ref 22–29)
COLOR SPEC: YELLOW — SIGNIFICANT CHANGE UP
CREAT SERPL-MCNC: 0.97 MG/DL — SIGNIFICANT CHANGE UP (ref 0.5–1.3)
DIFF PNL FLD: ABNORMAL
EOSINOPHIL # BLD AUTO: 0.08 K/UL — SIGNIFICANT CHANGE UP (ref 0–0.5)
EOSINOPHIL NFR BLD AUTO: 2.2 % — SIGNIFICANT CHANGE UP (ref 0–6)
EPI CELLS # UR: SIGNIFICANT CHANGE UP
GAS PNL BLDV: 143 MMOL/L — SIGNIFICANT CHANGE UP (ref 135–145)
GAS PNL BLDV: SIGNIFICANT CHANGE UP
GAS PNL BLDV: SIGNIFICANT CHANGE UP
GLUCOSE BLDV-MCNC: 101 MG/DL — HIGH (ref 70–99)
GLUCOSE SERPL-MCNC: 106 MG/DL — HIGH (ref 70–99)
GLUCOSE UR QL: NEGATIVE MG/DL — SIGNIFICANT CHANGE UP
HCO3 BLDV-SCNC: 29 MMOL/L — SIGNIFICANT CHANGE UP (ref 21–29)
HCOV PNL SPEC NAA+PROBE: DETECTED
HCT VFR BLD CALC: 37.9 % — SIGNIFICANT CHANGE UP (ref 34.5–45)
HCT VFR BLDA CALC: 38 — LOW (ref 39–50)
HGB BLD CALC-MCNC: 12.3 G/DL — SIGNIFICANT CHANGE UP (ref 11.5–15.5)
HGB BLD-MCNC: 12.2 G/DL — SIGNIFICANT CHANGE UP (ref 11.5–15.5)
IMM GRANULOCYTES NFR BLD AUTO: 0.5 % — SIGNIFICANT CHANGE UP (ref 0–1.5)
INR BLD: 1.16 RATIO — SIGNIFICANT CHANGE UP (ref 0.88–1.16)
KETONES UR-MCNC: NEGATIVE — SIGNIFICANT CHANGE UP
LACTATE BLDV-MCNC: 1.5 MMOL/L — SIGNIFICANT CHANGE UP (ref 0.5–2)
LEUKOCYTE ESTERASE UR-ACNC: NEGATIVE — SIGNIFICANT CHANGE UP
LYMPHOCYTES # BLD AUTO: 1.2 K/UL — SIGNIFICANT CHANGE UP (ref 1–3.3)
LYMPHOCYTES # BLD AUTO: 33 % — SIGNIFICANT CHANGE UP (ref 13–44)
MCHC RBC-ENTMCNC: 28.6 PG — SIGNIFICANT CHANGE UP (ref 27–34)
MCHC RBC-ENTMCNC: 32.2 GM/DL — SIGNIFICANT CHANGE UP (ref 32–36)
MCV RBC AUTO: 89 FL — SIGNIFICANT CHANGE UP (ref 80–100)
MONOCYTES # BLD AUTO: 0.54 K/UL — SIGNIFICANT CHANGE UP (ref 0–0.9)
MONOCYTES NFR BLD AUTO: 14.8 % — HIGH (ref 2–14)
NEUTROPHILS # BLD AUTO: 1.77 K/UL — LOW (ref 1.8–7.4)
NEUTROPHILS NFR BLD AUTO: 48.7 % — SIGNIFICANT CHANGE UP (ref 43–77)
NITRITE UR-MCNC: NEGATIVE — SIGNIFICANT CHANGE UP
OTHER CELLS CSF MANUAL: 16 ML/DL — LOW (ref 18–22)
PCO2 BLDV: 44 MMHG — HIGH (ref 39–42)
PH BLDV: 7.44 — HIGH (ref 7.32–7.43)
PH UR: 7 — SIGNIFICANT CHANGE UP (ref 5–8)
PLATELET # BLD AUTO: 201 K/UL — SIGNIFICANT CHANGE UP (ref 150–400)
PO2 BLDV: 102 MMHG — HIGH (ref 25–45)
POTASSIUM BLDV-SCNC: 4.9 MMOL/L — HIGH (ref 3.4–4.5)
POTASSIUM SERPL-MCNC: 4.9 MMOL/L — SIGNIFICANT CHANGE UP (ref 3.5–5.3)
POTASSIUM SERPL-SCNC: 4.9 MMOL/L — SIGNIFICANT CHANGE UP (ref 3.5–5.3)
PROT SERPL-MCNC: 7.7 G/DL — SIGNIFICANT CHANGE UP (ref 6.6–8.7)
PROT UR-MCNC: NEGATIVE MG/DL — SIGNIFICANT CHANGE UP
PROTHROM AB SERPL-ACNC: 13.4 SEC — SIGNIFICANT CHANGE UP (ref 10.6–13.6)
RAPID RVP RESULT: DETECTED
RBC # BLD: 4.26 M/UL — SIGNIFICANT CHANGE UP (ref 3.8–5.2)
RBC # FLD: 13.5 % — SIGNIFICANT CHANGE UP (ref 10.3–14.5)
RBC CASTS # UR COMP ASSIST: SIGNIFICANT CHANGE UP /HPF (ref 0–4)
SAO2 % BLDV: 98 % — SIGNIFICANT CHANGE UP
SARS-COV-2 RNA SPEC QL NAA+PROBE: SIGNIFICANT CHANGE UP
SODIUM SERPL-SCNC: 139 MMOL/L — SIGNIFICANT CHANGE UP (ref 135–145)
SP GR SPEC: 1 — LOW (ref 1.01–1.02)
TROPONIN T SERPL-MCNC: <0.01 NG/ML — SIGNIFICANT CHANGE UP (ref 0–0.06)
UROBILINOGEN FLD QL: NEGATIVE MG/DL — SIGNIFICANT CHANGE UP
WBC # BLD: 3.64 K/UL — LOW (ref 3.8–10.5)
WBC # FLD AUTO: 3.64 K/UL — LOW (ref 3.8–10.5)
WBC UR QL: SIGNIFICANT CHANGE UP

## 2021-04-22 PROCEDURE — 71045 X-RAY EXAM CHEST 1 VIEW: CPT

## 2021-04-22 PROCEDURE — 93970 EXTREMITY STUDY: CPT | Mod: 26

## 2021-04-22 PROCEDURE — 81001 URINALYSIS AUTO W/SCOPE: CPT

## 2021-04-22 PROCEDURE — 0225U NFCT DS DNA&RNA 21 SARSCOV2: CPT

## 2021-04-22 PROCEDURE — 85730 THROMBOPLASTIN TIME PARTIAL: CPT

## 2021-04-22 PROCEDURE — 99285 EMERGENCY DEPT VISIT HI MDM: CPT

## 2021-04-22 PROCEDURE — 84295 ASSAY OF SERUM SODIUM: CPT

## 2021-04-22 PROCEDURE — 82435 ASSAY OF BLOOD CHLORIDE: CPT

## 2021-04-22 PROCEDURE — 93010 ELECTROCARDIOGRAM REPORT: CPT

## 2021-04-22 PROCEDURE — 74177 CT ABD & PELVIS W/CONTRAST: CPT | Mod: 26,MG

## 2021-04-22 PROCEDURE — G1004: CPT

## 2021-04-22 PROCEDURE — 85025 COMPLETE CBC W/AUTO DIFF WBC: CPT

## 2021-04-22 PROCEDURE — 36415 COLL VENOUS BLD VENIPUNCTURE: CPT

## 2021-04-22 PROCEDURE — 83605 ASSAY OF LACTIC ACID: CPT

## 2021-04-22 PROCEDURE — 84132 ASSAY OF SERUM POTASSIUM: CPT

## 2021-04-22 PROCEDURE — 87086 URINE CULTURE/COLONY COUNT: CPT

## 2021-04-22 PROCEDURE — 82803 BLOOD GASES ANY COMBINATION: CPT

## 2021-04-22 PROCEDURE — 87186 SC STD MICRODIL/AGAR DIL: CPT

## 2021-04-22 PROCEDURE — 93005 ELECTROCARDIOGRAM TRACING: CPT

## 2021-04-22 PROCEDURE — 93970 EXTREMITY STUDY: CPT

## 2021-04-22 PROCEDURE — 99284 EMERGENCY DEPT VISIT MOD MDM: CPT | Mod: 25

## 2021-04-22 PROCEDURE — 84484 ASSAY OF TROPONIN QUANT: CPT

## 2021-04-22 PROCEDURE — 85014 HEMATOCRIT: CPT

## 2021-04-22 PROCEDURE — 74177 CT ABD & PELVIS W/CONTRAST: CPT

## 2021-04-22 PROCEDURE — 82330 ASSAY OF CALCIUM: CPT

## 2021-04-22 PROCEDURE — 87040 BLOOD CULTURE FOR BACTERIA: CPT

## 2021-04-22 PROCEDURE — 80053 COMPREHEN METABOLIC PANEL: CPT

## 2021-04-22 PROCEDURE — 85018 HEMOGLOBIN: CPT

## 2021-04-22 PROCEDURE — 85610 PROTHROMBIN TIME: CPT

## 2021-04-22 PROCEDURE — 71045 X-RAY EXAM CHEST 1 VIEW: CPT | Mod: 26

## 2021-04-22 PROCEDURE — 82947 ASSAY GLUCOSE BLOOD QUANT: CPT

## 2021-04-22 NOTE — ED PROVIDER NOTE - NS ED ROS FT
Const: + chills. Denies fever  HEENT: Denies blurry vision, sore throat  Neck: Denies neck pain/stiffness  Resp: Denies coughing, SOB  Cardiovascular: Denies CP, palpitations, LE edema  GI: + abdominal pain. Denies nausea, vomiting, diarrhea, constipation, blood in stool  : Denies urinary frequency/urgency/dysuria, hematuria  MSK: + b/l LE pain. Denies back pain  Neuro: Denies HA, dizziness, numbness, weakness  Skin: Denies rashes.

## 2021-04-22 NOTE — ED PROVIDER NOTE - PHYSICAL EXAMINATION
Const: Awake, alert and oriented. In no acute distress. Well appearing.  HEENT: NC/AT. Moist mucous membranes.  Eyes: No scleral icterus. EOMI.  Neck:. Soft and supple. Full ROM without pain.  Cardiac: Regular rate and regular rhythm. +S1/S2. No murmurs. Peripheral pulses 2+ and symmetric. B/L SHREYA boots with trace LE edema.  Resp: Speaking in full sentences. No evidence of respiratory distress. No wheezes, rales or rhonchi.  Abd: Soft, mildly diffusely tender, non-distended. Normal bowel sounds in all 4 quadrants. No guarding or rebound.  Back: Spine midline and non-tender. No CVAT.  Skin: No rashes, abrasions or lacerations.  Neuro: Awake, alert & oriented x 3. Moves all extremities symmetrically.

## 2021-04-22 NOTE — ED PROVIDER NOTE - OBJECTIVE STATEMENT
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 She states last night that she was "colder than a snowflake," and no matter how many blankets she had on she couldn't get warm. She notes associated rhinorrhea, nasal congestion, decreased appetite and b/l LE edema. She states that she wraps her legs every day due to swelling, but lately it's like walking on shards of glass. She denies nausea, vomiting, diarrhea, constipation. She denies allergies to medications, denies smoking, EtOH or illicit drugs.

## 2021-04-22 NOTE — ED PROVIDER NOTE - PATIENT PORTAL LINK FT
You can access the FollowMyHealth Patient Portal offered by [facility] by registering at the following website: [email or web address]. By joining [employer]s FollowMyHealth portal, you will also be able to view your health information using other applications (apps) compatible with our system.

## 2021-04-22 NOTE — ED ADULT TRIAGE NOTE - CHIEF COMPLAINT QUOTE
patient c/o generalized body aches since yesterday, reports chills but no fever. denies n/v/d, gu symptoms. denies recent sick contacts. took tylenol last night with no relief.

## 2021-04-22 NOTE — ED PROVIDER NOTE - CLINICAL SUMMARY MEDICAL DECISION MAKING FREE TEXT BOX
81 y/o F with chills, abdominal pain, increased urinary incontinence presents for evaluation. Rectal temp 99.2, patient not tachycardic, normotensive. ABd soft, but mildly diffusely tender. Will send sepsis labs, CT A/P, b/l venous dopplers and reassess.

## 2021-04-22 NOTE — ED PROVIDER NOTE - PROGRESS NOTE DETAILS
Nahomi: I attempted to call cousin Raúl who patient lives with at [phone], no answer, left message. Nahomi: I reassessed patient and shared results. SHe is sitting in bed, eating a pear that she had in her bag, slicing it with a knife. She states no one at home can receive her at this hour, but they will be there in the morning. Will place in observation over night pending social work to assist getting patient home. Nahomi: I reassessed patient and shared results. SHe is sitting in bed, eating a pear that she had in her bag, slicing it with a knife. Cousin Raúl was reached on the phone and will be home to receive her. Will set up a medicaid cab to take her home.

## 2021-04-23 VITALS
OXYGEN SATURATION: 97 % | HEART RATE: 87 BPM | SYSTOLIC BLOOD PRESSURE: 154 MMHG | RESPIRATION RATE: 19 BRPM | TEMPERATURE: 98 F | DIASTOLIC BLOOD PRESSURE: 80 MMHG

## 2021-07-31 NOTE — PATIENT PROFILE ADULT - NSPROCHRONICPAIN_GEN_A_NUR
Addended by: CHAYITO CASTRO on: 7/31/2021 10:33 AM     Modules accepted: Orders    
Addended by: MARGARET VILLEGAS on: 7/31/2021 10:26 AM     Modules accepted: Orders    
no

## 2022-08-25 NOTE — ED PROVIDER NOTE - NSCAREINITIATED _GEN_ER
Addended by: LEON HERRMANN on: 8/25/2022 01:39 PM     Modules accepted: Orders     Anny Comer(Attending)

## 2024-05-16 NOTE — ED PROVIDER NOTE - NSTOBACCO TYPE_GEN_A_CORE_RD
Transition of Care Plan:    RUR: 8%  Prior Level of Functioning:  Independent with ADLs  Disposition: Home with family, IV abx-[facility]   Follow up appointments: Follow up with PCP and/or Specialist   DME needed: N/A  Transportation at discharge: Family to transport   IM/IMM Medicare/ letter given: N/A  Is patient a  and connected with [facility]? N/A   If yes, was  transfer form completed and [facility] notified? N/A  Caregiver Contact: N/A  Discharge Caregiver contacted prior to discharge? Family   Care Conference needed? Not at this time   Barriers to discharge:  Medical Stable    CM received a call from [facility] rep, regarding referral for IV abx on 5/15/24.  [facility] able to accept pt, for IV abx, and rep reviewed pricing of medication cost.  Rep informed CM that pt has an $750 deductible, that will have to be met, until pt is currently covered at 80% cost, and cost of medication is 50/day.  Once deductible met, pt will be covered at 100% cost.    CM informed that [employer] rep will complete teaching and education at bedside, by or after noon.    CM will continue to follow.    EDDIE Ornelas CM  [phone]             Cigarettes

## 2024-06-22 NOTE — DIETITIAN INITIAL EVALUATION ADULT. - ETIOLOGY
HR=90 bpm, OHBF=863/81 mmhg, SpO2=93.0 %, Resp=0 B/min, EtCO2=0 mmHg, Apnea=36 Seconds, [place]=3 related to inadequate protein-energy intake in setting of difficulty with procurement and preparation of meals resulting in decreased PO intake